# Patient Record
Sex: FEMALE | Race: WHITE
[De-identification: names, ages, dates, MRNs, and addresses within clinical notes are randomized per-mention and may not be internally consistent; named-entity substitution may affect disease eponyms.]

---

## 2019-10-18 ENCOUNTER — TRANSCRIPTION ENCOUNTER (OUTPATIENT)
Age: 79
End: 2019-10-18

## 2019-10-22 ENCOUNTER — EMERGENCY (EMERGENCY)
Facility: HOSPITAL | Age: 79
LOS: 0 days | Discharge: HOME | End: 2019-10-22
Attending: EMERGENCY MEDICINE
Payer: MEDICARE

## 2019-10-22 VITALS
RESPIRATION RATE: 17 BRPM | SYSTOLIC BLOOD PRESSURE: 151 MMHG | DIASTOLIC BLOOD PRESSURE: 82 MMHG | OXYGEN SATURATION: 100 % | TEMPERATURE: 98 F | HEART RATE: 73 BPM

## 2019-10-22 VITALS — WEIGHT: 110.01 LBS | HEIGHT: 59 IN

## 2019-10-22 DIAGNOSIS — F32.9 MAJOR DEPRESSIVE DISORDER, SINGLE EPISODE, UNSPECIFIED: ICD-10-CM

## 2019-10-22 DIAGNOSIS — R05 COUGH: ICD-10-CM

## 2019-10-22 DIAGNOSIS — Z90.89 ACQUIRED ABSENCE OF OTHER ORGANS: Chronic | ICD-10-CM

## 2019-10-22 DIAGNOSIS — J06.9 ACUTE UPPER RESPIRATORY INFECTION, UNSPECIFIED: ICD-10-CM

## 2019-10-22 DIAGNOSIS — F45.1 UNDIFFERENTIATED SOMATOFORM DISORDER: ICD-10-CM

## 2019-10-22 LAB
ALBUMIN SERPL ELPH-MCNC: 3.8 G/DL — SIGNIFICANT CHANGE UP (ref 3.3–5)
ALP SERPL-CCNC: 131 U/L — HIGH (ref 40–120)
ALT FLD-CCNC: 18 U/L — SIGNIFICANT CHANGE UP (ref 12–78)
ANION GAP SERPL CALC-SCNC: 7 MMOL/L — SIGNIFICANT CHANGE UP (ref 5–17)
AST SERPL-CCNC: 28 U/L — SIGNIFICANT CHANGE UP (ref 15–37)
BASOPHILS # BLD AUTO: 0.09 K/UL — SIGNIFICANT CHANGE UP (ref 0–0.2)
BASOPHILS NFR BLD AUTO: 0.8 % — SIGNIFICANT CHANGE UP (ref 0–2)
BILIRUB SERPL-MCNC: 0.5 MG/DL — SIGNIFICANT CHANGE UP (ref 0.2–1.2)
BUN SERPL-MCNC: 9 MG/DL — SIGNIFICANT CHANGE UP (ref 7–23)
CALCIUM SERPL-MCNC: 9.5 MG/DL — SIGNIFICANT CHANGE UP (ref 8.5–10.1)
CHLORIDE SERPL-SCNC: 96 MMOL/L — SIGNIFICANT CHANGE UP (ref 96–108)
CO2 SERPL-SCNC: 24 MMOL/L — SIGNIFICANT CHANGE UP (ref 22–31)
CREAT SERPL-MCNC: 0.92 MG/DL — SIGNIFICANT CHANGE UP (ref 0.5–1.3)
EOSINOPHIL # BLD AUTO: 0.13 K/UL — SIGNIFICANT CHANGE UP (ref 0–0.5)
EOSINOPHIL NFR BLD AUTO: 1.2 % — SIGNIFICANT CHANGE UP (ref 0–6)
GLUCOSE SERPL-MCNC: 104 MG/DL — HIGH (ref 70–99)
HCT VFR BLD CALC: 42.1 % — SIGNIFICANT CHANGE UP (ref 34.5–45)
HGB BLD-MCNC: 14.8 G/DL — SIGNIFICANT CHANGE UP (ref 11.5–15.5)
IMM GRANULOCYTES NFR BLD AUTO: 1.7 % — HIGH (ref 0–1.5)
LYMPHOCYTES # BLD AUTO: 2.47 K/UL — SIGNIFICANT CHANGE UP (ref 1–3.3)
LYMPHOCYTES # BLD AUTO: 22.7 % — SIGNIFICANT CHANGE UP (ref 13–44)
MAGNESIUM SERPL-MCNC: 2.1 MG/DL — SIGNIFICANT CHANGE UP (ref 1.6–2.6)
MCHC RBC-ENTMCNC: 30.1 PG — SIGNIFICANT CHANGE UP (ref 27–34)
MCHC RBC-ENTMCNC: 35.2 GM/DL — SIGNIFICANT CHANGE UP (ref 32–36)
MCV RBC AUTO: 85.6 FL — SIGNIFICANT CHANGE UP (ref 80–100)
MONOCYTES # BLD AUTO: 0.88 K/UL — SIGNIFICANT CHANGE UP (ref 0–0.9)
MONOCYTES NFR BLD AUTO: 8.1 % — SIGNIFICANT CHANGE UP (ref 2–14)
NEUTROPHILS # BLD AUTO: 7.12 K/UL — SIGNIFICANT CHANGE UP (ref 1.8–7.4)
NEUTROPHILS NFR BLD AUTO: 65.5 % — SIGNIFICANT CHANGE UP (ref 43–77)
PHOSPHATE SERPL-MCNC: 3.2 MG/DL — SIGNIFICANT CHANGE UP (ref 2.5–4.5)
PLATELET # BLD AUTO: 398 K/UL — SIGNIFICANT CHANGE UP (ref 150–400)
POTASSIUM SERPL-MCNC: 5.1 MMOL/L — SIGNIFICANT CHANGE UP (ref 3.5–5.3)
POTASSIUM SERPL-SCNC: 5.1 MMOL/L — SIGNIFICANT CHANGE UP (ref 3.5–5.3)
PROT SERPL-MCNC: 8.1 GM/DL — SIGNIFICANT CHANGE UP (ref 6–8.3)
RBC # BLD: 4.92 M/UL — SIGNIFICANT CHANGE UP (ref 3.8–5.2)
RBC # FLD: 12.3 % — SIGNIFICANT CHANGE UP (ref 10.3–14.5)
SODIUM SERPL-SCNC: 127 MMOL/L — LOW (ref 135–145)
TROPONIN I SERPL-MCNC: <0.015 NG/ML — SIGNIFICANT CHANGE UP (ref 0.01–0.04)
WBC # BLD: 10.88 K/UL — HIGH (ref 3.8–10.5)
WBC # FLD AUTO: 10.88 K/UL — HIGH (ref 3.8–10.5)

## 2019-10-22 PROCEDURE — 93005 ELECTROCARDIOGRAM TRACING: CPT

## 2019-10-22 PROCEDURE — 93010 ELECTROCARDIOGRAM REPORT: CPT

## 2019-10-22 PROCEDURE — 80053 COMPREHEN METABOLIC PANEL: CPT

## 2019-10-22 PROCEDURE — 85025 COMPLETE CBC W/AUTO DIFF WBC: CPT

## 2019-10-22 PROCEDURE — 84484 ASSAY OF TROPONIN QUANT: CPT

## 2019-10-22 PROCEDURE — 36415 COLL VENOUS BLD VENIPUNCTURE: CPT

## 2019-10-22 PROCEDURE — 99283 EMERGENCY DEPT VISIT LOW MDM: CPT | Mod: 25

## 2019-10-22 PROCEDURE — 83735 ASSAY OF MAGNESIUM: CPT

## 2019-10-22 PROCEDURE — 99283 EMERGENCY DEPT VISIT LOW MDM: CPT

## 2019-10-22 PROCEDURE — 71045 X-RAY EXAM CHEST 1 VIEW: CPT

## 2019-10-22 PROCEDURE — 84100 ASSAY OF PHOSPHORUS: CPT

## 2019-10-22 PROCEDURE — 71045 X-RAY EXAM CHEST 1 VIEW: CPT | Mod: 26

## 2019-10-22 RX ORDER — SODIUM CHLORIDE 9 MG/ML
1000 INJECTION INTRAMUSCULAR; INTRAVENOUS; SUBCUTANEOUS ONCE
Refills: 0 | Status: COMPLETED | OUTPATIENT
Start: 2019-10-22 | End: 2019-10-22

## 2019-10-22 RX ADMIN — SODIUM CHLORIDE 2000 MILLILITER(S): 9 INJECTION INTRAMUSCULAR; INTRAVENOUS; SUBCUTANEOUS at 11:25

## 2019-10-22 RX ADMIN — SODIUM CHLORIDE 1000 MILLILITER(S): 9 INJECTION INTRAMUSCULAR; INTRAVENOUS; SUBCUTANEOUS at 12:26

## 2019-10-22 NOTE — ED PROVIDER NOTE - MUSCULOSKELETAL, MLM
Spine appears normal, range of motion is not limited, no muscle or joint tenderness. CARRASCO x 4. No focal swelling tenderness.

## 2019-10-22 NOTE — ED STATDOCS - PMH
Esophageal reflux    IBD (inflammatory bowel disease)    Major depressive disorder    Memory disorder    Undifferentiated somatoform disorder

## 2019-10-22 NOTE — ED STATDOCS - PROGRESS NOTE DETAILS
Davida REEDER for ED attending, Dr. Chaves: 78 y/o female with PMHx of undifferentiated somatoform disorder, memory disorder, major depressive disorder, IBD, and esophageal reflux presents to the ED from MD office for "dangerously high potassium level". Pt reports on abx for 3 days for upper respiratory infection, +cough x2 days. Notes +weakness and +fatigue. No fever. Former smoker. NKDA. Will send pt to main ED for further evaluation.

## 2019-10-22 NOTE — ED PROVIDER NOTE - NSFOLLOWUPINSTRUCTIONS_ED_ALL_ED_FT
Blood potassium level done today in ED is normal.  Prior high potassium level was a false level incurred during bloodletting process.  Continue your current medications as per routine.  Follow up regular doctor this week.  Follow up serum Sodium level in 1 - 2 weeks.        ED evaluation and management discussed with the patient and family (if available) in detail.  Close PMD follow up encouraged.  Strict ED return instructions discussed in detail and patient given the opportunity to ask any questions about their discharge diagnosis and instructions. Patient verbalized understanding.

## 2019-10-22 NOTE — ED PROVIDER NOTE - OBJECTIVE STATEMENT
78 y/o female with PMHx of undifferentiated somatoform disorder, memory disorder, major depressive disorder, IBD, and esophageal reflux presents to the ED from MD office for "dangerously high potassium level". Pt reports she is taking PO doxy total of 5 doses for URI. Had a f/u with PMD yesterday and had labs done and showed high potassium. +unproductive constant cough x2 weeks, +minimal sob, +minimal dizziness. +mild decrease po intake, no anorexia Denies fever, n/v. Former smoker. NKDA.

## 2019-10-22 NOTE — ED PROVIDER NOTE - PATIENT PORTAL LINK FT
You can access the FollowMyHealth Patient Portal offered by [facility] by registering at the following website: [email or web address]. By joining [employer]’s FollowMyHealth portal, you will also be able to view your health information using other applications (apps) compatible with our system.

## 2019-10-22 NOTE — ED PROVIDER NOTE - CLINICAL SUMMARY MEDICAL DECISION MAKING FREE TEXT BOX
78 y/o f on PO doxy regarding URI, BIB  after routine outpatient labs done yesterday revealed reported hyperkalemia, Plan; EKG, CXR, labs, IV fluids, potassium normal, sodium mildly decrease, non focal exam no need for admission, CXR negative, DC.

## 2019-10-22 NOTE — ED ADULT NURSE NOTE - NSIMPLEMENTINTERV_GEN_ALL_ED
Implemented All Universal Safety Interventions:  [place] to call system. Call bell, personal items and telephone within reach. Instruct patient to call for assistance. Room bathroom lighting operational. Non-slip footwear when patient is off stretcher. Physically safe environment: no spills, clutter or unnecessary equipment. Stretcher in lowest position, wheels locked, appropriate side rails in place.

## 2019-10-22 NOTE — ED ADULT TRIAGE NOTE - CHIEF COMPLAINT QUOTE
Patient comes to ED from MD office for "dangerously high potassium level". Pt reports on abx for 3 days for upper respiratory infection. Pt reports weakness and fatigue. denies any fevers

## 2022-06-21 ENCOUNTER — EMERGENCY (EMERGENCY)
Facility: HOSPITAL | Age: 82
LOS: 0 days | Discharge: HOME | End: 2022-06-21
Attending: EMERGENCY MEDICINE
Payer: MEDICARE

## 2022-06-21 VITALS
HEART RATE: 70 BPM | OXYGEN SATURATION: 95 % | DIASTOLIC BLOOD PRESSURE: 69 MMHG | RESPIRATION RATE: 18 BRPM | SYSTOLIC BLOOD PRESSURE: 134 MMHG

## 2022-06-21 VITALS — WEIGHT: 108.03 LBS | HEIGHT: 59 IN

## 2022-06-21 DIAGNOSIS — Z90.89 ACQUIRED ABSENCE OF OTHER ORGANS: Chronic | ICD-10-CM

## 2022-06-21 DIAGNOSIS — Z90.89 ACQUIRED ABSENCE OF OTHER ORGANS: ICD-10-CM

## 2022-06-21 DIAGNOSIS — F32.9 MAJOR DEPRESSIVE DISORDER, SINGLE EPISODE, UNSPECIFIED: ICD-10-CM

## 2022-06-21 DIAGNOSIS — F45.1 UNDIFFERENTIATED SOMATOFORM DISORDER: ICD-10-CM

## 2022-06-21 DIAGNOSIS — K52.9 NONINFECTIVE GASTROENTERITIS AND COLITIS, UNSPECIFIED: ICD-10-CM

## 2022-06-21 DIAGNOSIS — R63.0 ANOREXIA: ICD-10-CM

## 2022-06-21 DIAGNOSIS — K21.9 GASTRO-ESOPHAGEAL REFLUX DISEASE WITHOUT ESOPHAGITIS: ICD-10-CM

## 2022-06-21 DIAGNOSIS — R41.3 OTHER AMNESIA: ICD-10-CM

## 2022-06-21 DIAGNOSIS — R42 DIZZINESS AND GIDDINESS: ICD-10-CM

## 2022-06-21 LAB
ALBUMIN SERPL ELPH-MCNC: 3.6 G/DL — SIGNIFICANT CHANGE UP (ref 3.3–5)
ALP SERPL-CCNC: 104 U/L — SIGNIFICANT CHANGE UP (ref 40–120)
ALT FLD-CCNC: 16 U/L — SIGNIFICANT CHANGE UP (ref 12–78)
ANION GAP SERPL CALC-SCNC: 8 MMOL/L — SIGNIFICANT CHANGE UP (ref 5–17)
APPEARANCE UR: CLEAR — SIGNIFICANT CHANGE UP
AST SERPL-CCNC: 10 U/L — LOW (ref 15–37)
BASOPHILS # BLD AUTO: 0.06 K/UL — SIGNIFICANT CHANGE UP (ref 0–0.2)
BASOPHILS NFR BLD AUTO: 0.5 % — SIGNIFICANT CHANGE UP (ref 0–2)
BILIRUB SERPL-MCNC: 0.5 MG/DL — SIGNIFICANT CHANGE UP (ref 0.2–1.2)
BILIRUB UR-MCNC: NEGATIVE — SIGNIFICANT CHANGE UP
BUN SERPL-MCNC: 16 MG/DL — SIGNIFICANT CHANGE UP (ref 7–23)
CALCIUM SERPL-MCNC: 8.8 MG/DL — SIGNIFICANT CHANGE UP (ref 8.5–10.1)
CHLORIDE SERPL-SCNC: 104 MMOL/L — SIGNIFICANT CHANGE UP (ref 96–108)
CO2 SERPL-SCNC: 24 MMOL/L — SIGNIFICANT CHANGE UP (ref 22–31)
COLOR SPEC: YELLOW — SIGNIFICANT CHANGE UP
CREAT SERPL-MCNC: 1.11 MG/DL — SIGNIFICANT CHANGE UP (ref 0.5–1.3)
DIFF PNL FLD: ABNORMAL
EGFR: 50 ML/MIN/1.73M2 — LOW
EOSINOPHIL # BLD AUTO: 0.04 K/UL — SIGNIFICANT CHANGE UP (ref 0–0.5)
EOSINOPHIL NFR BLD AUTO: 0.4 % — SIGNIFICANT CHANGE UP (ref 0–6)
GLUCOSE SERPL-MCNC: 115 MG/DL — HIGH (ref 70–99)
GLUCOSE UR QL: NEGATIVE — SIGNIFICANT CHANGE UP
HCT VFR BLD CALC: 42.5 % — SIGNIFICANT CHANGE UP (ref 34.5–45)
HGB BLD-MCNC: 14.2 G/DL — SIGNIFICANT CHANGE UP (ref 11.5–15.5)
IMM GRANULOCYTES NFR BLD AUTO: 0.4 % — SIGNIFICANT CHANGE UP (ref 0–1.5)
KETONES UR-MCNC: NEGATIVE — SIGNIFICANT CHANGE UP
LEUKOCYTE ESTERASE UR-ACNC: NEGATIVE — SIGNIFICANT CHANGE UP
LYMPHOCYTES # BLD AUTO: 1.08 K/UL — SIGNIFICANT CHANGE UP (ref 1–3.3)
LYMPHOCYTES # BLD AUTO: 9.8 % — LOW (ref 13–44)
MCHC RBC-ENTMCNC: 29.1 PG — SIGNIFICANT CHANGE UP (ref 27–34)
MCHC RBC-ENTMCNC: 33.4 GM/DL — SIGNIFICANT CHANGE UP (ref 32–36)
MCV RBC AUTO: 87.1 FL — SIGNIFICANT CHANGE UP (ref 80–100)
MONOCYTES # BLD AUTO: 0.43 K/UL — SIGNIFICANT CHANGE UP (ref 0–0.9)
MONOCYTES NFR BLD AUTO: 3.9 % — SIGNIFICANT CHANGE UP (ref 2–14)
NEUTROPHILS # BLD AUTO: 9.37 K/UL — HIGH (ref 1.8–7.4)
NEUTROPHILS NFR BLD AUTO: 85 % — HIGH (ref 43–77)
NITRITE UR-MCNC: NEGATIVE — SIGNIFICANT CHANGE UP
PH UR: 7 — SIGNIFICANT CHANGE UP (ref 5–8)
PLATELET # BLD AUTO: 337 K/UL — SIGNIFICANT CHANGE UP (ref 150–400)
POTASSIUM SERPL-MCNC: 4.3 MMOL/L — SIGNIFICANT CHANGE UP (ref 3.5–5.3)
POTASSIUM SERPL-SCNC: 4.3 MMOL/L — SIGNIFICANT CHANGE UP (ref 3.5–5.3)
PROT SERPL-MCNC: 7.4 GM/DL — SIGNIFICANT CHANGE UP (ref 6–8.3)
PROT UR-MCNC: NEGATIVE — SIGNIFICANT CHANGE UP
RBC # BLD: 4.88 M/UL — SIGNIFICANT CHANGE UP (ref 3.8–5.2)
RBC # FLD: 12.7 % — SIGNIFICANT CHANGE UP (ref 10.3–14.5)
SODIUM SERPL-SCNC: 136 MMOL/L — SIGNIFICANT CHANGE UP (ref 135–145)
SP GR SPEC: 1 — LOW (ref 1.01–1.02)
UROBILINOGEN FLD QL: NEGATIVE — SIGNIFICANT CHANGE UP
WBC # BLD: 11.02 K/UL — HIGH (ref 3.8–10.5)
WBC # FLD AUTO: 11.02 K/UL — HIGH (ref 3.8–10.5)

## 2022-06-21 PROCEDURE — 80053 COMPREHEN METABOLIC PANEL: CPT

## 2022-06-21 PROCEDURE — 87086 URINE CULTURE/COLONY COUNT: CPT

## 2022-06-21 PROCEDURE — 99283 EMERGENCY DEPT VISIT LOW MDM: CPT

## 2022-06-21 PROCEDURE — 36415 COLL VENOUS BLD VENIPUNCTURE: CPT

## 2022-06-21 PROCEDURE — 81001 URINALYSIS AUTO W/SCOPE: CPT

## 2022-06-21 PROCEDURE — 99284 EMERGENCY DEPT VISIT MOD MDM: CPT | Mod: FS

## 2022-06-21 PROCEDURE — 93010 ELECTROCARDIOGRAM REPORT: CPT

## 2022-06-21 PROCEDURE — 85025 COMPLETE CBC W/AUTO DIFF WBC: CPT

## 2022-06-21 PROCEDURE — 93005 ELECTROCARDIOGRAM TRACING: CPT

## 2022-06-21 RX ORDER — MECLIZINE HCL 12.5 MG
1 TABLET ORAL
Qty: 21 | Refills: 0
Start: 2022-06-21 | End: 2022-06-27

## 2022-06-21 RX ORDER — SODIUM CHLORIDE 9 MG/ML
1000 INJECTION INTRAMUSCULAR; INTRAVENOUS; SUBCUTANEOUS ONCE
Refills: 0 | Status: COMPLETED | OUTPATIENT
Start: 2022-06-21 | End: 2022-06-21

## 2022-06-21 RX ORDER — MECLIZINE HCL 12.5 MG
12.5 TABLET ORAL ONCE
Refills: 0 | Status: COMPLETED | OUTPATIENT
Start: 2022-06-21 | End: 2022-06-21

## 2022-06-21 RX ADMIN — SODIUM CHLORIDE 1000 MILLILITER(S): 9 INJECTION INTRAMUSCULAR; INTRAVENOUS; SUBCUTANEOUS at 13:17

## 2022-06-21 RX ADMIN — Medication 12.5 MILLIGRAM(S): at 13:17

## 2022-06-21 NOTE — ED STATDOCS - PATIENT PORTAL LINK FT
You can access the FollowMyHealth Patient Portal offered by [facility] by registering at the following website: [email or web address]. By joining [employer]’s FollowMyHealth portal, you will also be able to view your health information using other applications (apps) compatible with our system.

## 2022-06-21 NOTE — ED STATDOCS - NS ED ATTENDING STATEMENT MOD
This was a shared visit with the TARA. I reviewed and verified the documentation and independently performed the documented:

## 2022-06-21 NOTE — ED STATDOCS - CLINICAL SUMMARY MEDICAL DECISION MAKING FREE TEXT BOX
Pt c/o dizziness x6 weeks, per  pt has had extensive workup w/ PCP, neuro, and cardio with reports of graham MRI. Pt here w/ no deficits. Will check labs, EKG, and reevaluate.

## 2022-06-21 NOTE — ED ADULT NURSE NOTE - OBJECTIVE STATEMENT
Pt c/i dizziness for 6 weeks .pt hd clearness with cardiologist and cardiologist with no significant finding. pt was send by her md for lost of appetite. pt is walking with steady gait . according to  pt expressing her thoughts to end her life no plan. Pt c/i dizziness for 6 weeks .pt hd clearness with cardiologist and cardiologist with no significant finding. pt was send by her md for lost of appetite. pt is walking with steady gait . .

## 2022-06-21 NOTE — ED STATDOCS - NSFOLLOWUPINSTRUCTIONS_ED_ALL_ED_FT
Follow up with your neurologist for further evaluation of your symptoms.  Take the medication as directed.     Return to the ER for any new or other concerns.       Dizziness    WHAT YOU NEED TO KNOW:    Dizziness is a feeling of being off balance or unsteady. Common causes of dizziness are an inner ear fluid imbalance or a lack of oxygen in your blood. Dizziness may be acute (lasts 3 days or less) or chronic (lasts longer than 3 days). You may have dizzy spells that last from seconds to a few hours.     DISCHARGE INSTRUCTIONS:    Return to the emergency department if:     You have a headache and a stiff neck.      You have shaking chills and a fever.       You vomit over and over with no relief.       Your vomit or bowel movements are red or black.       You have pain in your chest, back, or abdomen.       You have numbness, especially in your face, arms, or legs.       You have trouble moving your arms or legs.       You are confused.     Contact your healthcare provider if:     You have a fever.       Your symptoms do not get better with treatment.       You have questions or concerns about your condition or care.     Manage your symptoms:     Do not drive or operate heavy machinery when you are dizzy.       Get up slowly from sitting or lying down.       Drink plenty of liquids. Liquids help prevent dehydration. Ask how much liquid to drink each day and which liquids are best for you.    Follow up with your healthcare provider as directed: Write down your questions so you remember to ask them during your visits.

## 2022-06-21 NOTE — ED STATDOCS - OBJECTIVE STATEMENT
83 y/o female w/ a PMHx of undifferentiated somatoform disorder, memory disorder, major depressive disorder, IBD, and esophageal reflux presents to the ED BIB  c/o worsening room spinning dizziness. Pt  reports pt started c/o dizziness x6 weeks ago and for the 2 weeks dizziness has been worsening. Pt  reports pt saw a neuro and cardio w/o any obvious findings. Pt  also reports decreased PO intake.

## 2022-06-21 NOTE — ED STATDOCS - ATTENDING APP SHARED VISIT CONTRIBUTION OF CARE
I,Phoenix Scott MD,  performed the initial face to face bedside interview with this patient regarding history of present illness, review of symptoms and relevant past medical, social and family history.  I completed an independent physical examination.  I was the initial provider who evaluated this patient. I have signed out the follow up of any pending tests (i.e. labs, radiological studies) to the ACP.  I have communicated the patient’s plan of care and disposition with the ACP.  The history, relevant review of systems, past medical and surgical history, medical decision making, and physical examination was documented by the scribe in my presence and I attest to the accuracy of the documentation.

## 2022-06-21 NOTE — ED STATDOCS - PROGRESS NOTE DETAILS
83 yo female with a PMH of chronic memory disorder, IBD, GERD, presents with dizziness x 6 weeks. Pt was evaluated by cardio and neurology and was sent for EEG, MRI and have been unremarkable. Pt states she still becomes dizzy. Dizziness is characterized as room spinning. Pt was not prescribed medications for her symptoms. For the last 2 days, pt also had a decreased appetite. Denies cp, sob, n/v/d. Gait steady in intake. WIll obtain, labs, UA, IVF, meds, and reeval. -Maurilio Bertrand PA-C Pt feeling better after the medication. Discussed results with pt and . Informed that will send medication to the pharmacy for vertigo and to f/u with neurology. Pt aware and agrees with plan. -Maurilio Bertrand PA-C

## 2022-06-21 NOTE — ED STATDOCS - NSICDXPASTMEDICALHX_GEN_ALL_CORE_FT
PAST MEDICAL HISTORY:  Esophageal reflux     IBD (inflammatory bowel disease)     Major depressive disorder     Memory disorder     Undifferentiated somatoform disorder

## 2022-06-22 LAB
CULTURE RESULTS: SIGNIFICANT CHANGE UP
SPECIMEN SOURCE: SIGNIFICANT CHANGE UP

## 2022-06-23 ENCOUNTER — EMERGENCY (EMERGENCY)
Facility: HOSPITAL | Age: 82
LOS: 0 days | Discharge: HOME | End: 2022-06-23
Attending: EMERGENCY MEDICINE
Payer: MEDICARE

## 2022-06-23 VITALS
HEART RATE: 85 BPM | DIASTOLIC BLOOD PRESSURE: 90 MMHG | SYSTOLIC BLOOD PRESSURE: 157 MMHG | RESPIRATION RATE: 16 BRPM | OXYGEN SATURATION: 96 % | TEMPERATURE: 98 F

## 2022-06-23 VITALS — WEIGHT: 119.93 LBS | HEIGHT: 59 IN

## 2022-06-23 DIAGNOSIS — K50.00 CROHN'S DISEASE OF SMALL INTESTINE WITHOUT COMPLICATIONS: ICD-10-CM

## 2022-06-23 DIAGNOSIS — F32.A DEPRESSION, UNSPECIFIED: ICD-10-CM

## 2022-06-23 DIAGNOSIS — R63.0 ANOREXIA: ICD-10-CM

## 2022-06-23 DIAGNOSIS — K21.9 GASTRO-ESOPHAGEAL REFLUX DISEASE WITHOUT ESOPHAGITIS: ICD-10-CM

## 2022-06-23 DIAGNOSIS — R42 DIZZINESS AND GIDDINESS: ICD-10-CM

## 2022-06-23 DIAGNOSIS — R19.7 DIARRHEA, UNSPECIFIED: ICD-10-CM

## 2022-06-23 DIAGNOSIS — F41.3 OTHER MIXED ANXIETY DISORDERS: ICD-10-CM

## 2022-06-23 DIAGNOSIS — F45.9 SOMATOFORM DISORDER, UNSPECIFIED: ICD-10-CM

## 2022-06-23 DIAGNOSIS — Z90.89 ACQUIRED ABSENCE OF OTHER ORGANS: Chronic | ICD-10-CM

## 2022-06-23 LAB
ALBUMIN SERPL ELPH-MCNC: 3.4 G/DL — SIGNIFICANT CHANGE UP (ref 3.3–5)
ALP SERPL-CCNC: 109 U/L — SIGNIFICANT CHANGE UP (ref 40–120)
ALT FLD-CCNC: 14 U/L — SIGNIFICANT CHANGE UP (ref 12–78)
ANION GAP SERPL CALC-SCNC: 4 MMOL/L — LOW (ref 5–17)
AST SERPL-CCNC: 16 U/L — SIGNIFICANT CHANGE UP (ref 15–37)
BASOPHILS # BLD AUTO: 0.04 K/UL — SIGNIFICANT CHANGE UP (ref 0–0.2)
BASOPHILS NFR BLD AUTO: 0.5 % — SIGNIFICANT CHANGE UP (ref 0–2)
BILIRUB SERPL-MCNC: 0.3 MG/DL — SIGNIFICANT CHANGE UP (ref 0.2–1.2)
BUN SERPL-MCNC: 18 MG/DL — SIGNIFICANT CHANGE UP (ref 7–23)
CALCIUM SERPL-MCNC: 8.8 MG/DL — SIGNIFICANT CHANGE UP (ref 8.5–10.1)
CHLORIDE SERPL-SCNC: 102 MMOL/L — SIGNIFICANT CHANGE UP (ref 96–108)
CO2 SERPL-SCNC: 28 MMOL/L — SIGNIFICANT CHANGE UP (ref 22–31)
CREAT SERPL-MCNC: 1.01 MG/DL — SIGNIFICANT CHANGE UP (ref 0.5–1.3)
EGFR: 56 ML/MIN/1.73M2 — LOW
EOSINOPHIL # BLD AUTO: 0.08 K/UL — SIGNIFICANT CHANGE UP (ref 0–0.5)
EOSINOPHIL NFR BLD AUTO: 1 % — SIGNIFICANT CHANGE UP (ref 0–6)
GLUCOSE SERPL-MCNC: 102 MG/DL — HIGH (ref 70–99)
HCT VFR BLD CALC: 43.1 % — SIGNIFICANT CHANGE UP (ref 34.5–45)
HGB BLD-MCNC: 14.4 G/DL — SIGNIFICANT CHANGE UP (ref 11.5–15.5)
IMM GRANULOCYTES NFR BLD AUTO: 0.4 % — SIGNIFICANT CHANGE UP (ref 0–1.5)
LYMPHOCYTES # BLD AUTO: 1.54 K/UL — SIGNIFICANT CHANGE UP (ref 1–3.3)
LYMPHOCYTES # BLD AUTO: 18.7 % — SIGNIFICANT CHANGE UP (ref 13–44)
MCHC RBC-ENTMCNC: 29.4 PG — SIGNIFICANT CHANGE UP (ref 27–34)
MCHC RBC-ENTMCNC: 33.4 GM/DL — SIGNIFICANT CHANGE UP (ref 32–36)
MCV RBC AUTO: 88.1 FL — SIGNIFICANT CHANGE UP (ref 80–100)
MONOCYTES # BLD AUTO: 0.55 K/UL — SIGNIFICANT CHANGE UP (ref 0–0.9)
MONOCYTES NFR BLD AUTO: 6.7 % — SIGNIFICANT CHANGE UP (ref 2–14)
NEUTROPHILS # BLD AUTO: 6 K/UL — SIGNIFICANT CHANGE UP (ref 1.8–7.4)
NEUTROPHILS NFR BLD AUTO: 72.7 % — SIGNIFICANT CHANGE UP (ref 43–77)
PLATELET # BLD AUTO: 289 K/UL — SIGNIFICANT CHANGE UP (ref 150–400)
POTASSIUM SERPL-MCNC: 4.2 MMOL/L — SIGNIFICANT CHANGE UP (ref 3.5–5.3)
POTASSIUM SERPL-SCNC: 4.2 MMOL/L — SIGNIFICANT CHANGE UP (ref 3.5–5.3)
PROT SERPL-MCNC: 7.4 GM/DL — SIGNIFICANT CHANGE UP (ref 6–8.3)
RBC # BLD: 4.89 M/UL — SIGNIFICANT CHANGE UP (ref 3.8–5.2)
RBC # FLD: 12.9 % — SIGNIFICANT CHANGE UP (ref 10.3–14.5)
SODIUM SERPL-SCNC: 134 MMOL/L — LOW (ref 135–145)
TROPONIN I, HIGH SENSITIVITY RESULT: 3.66 NG/L — SIGNIFICANT CHANGE UP
WBC # BLD: 8.24 K/UL — SIGNIFICANT CHANGE UP (ref 3.8–10.5)
WBC # FLD AUTO: 8.24 K/UL — SIGNIFICANT CHANGE UP (ref 3.8–10.5)

## 2022-06-23 PROCEDURE — U0005: CPT

## 2022-06-23 PROCEDURE — 93010 ELECTROCARDIOGRAM REPORT: CPT

## 2022-06-23 PROCEDURE — 99285 EMERGENCY DEPT VISIT HI MDM: CPT

## 2022-06-23 PROCEDURE — 70450 CT HEAD/BRAIN W/O DYE: CPT | Mod: MA

## 2022-06-23 PROCEDURE — 93005 ELECTROCARDIOGRAM TRACING: CPT

## 2022-06-23 PROCEDURE — 70450 CT HEAD/BRAIN W/O DYE: CPT | Mod: 26,MA

## 2022-06-23 PROCEDURE — 71045 X-RAY EXAM CHEST 1 VIEW: CPT

## 2022-06-23 PROCEDURE — 84484 ASSAY OF TROPONIN QUANT: CPT

## 2022-06-23 PROCEDURE — 80053 COMPREHEN METABOLIC PANEL: CPT

## 2022-06-23 PROCEDURE — 36415 COLL VENOUS BLD VENIPUNCTURE: CPT

## 2022-06-23 PROCEDURE — 71045 X-RAY EXAM CHEST 1 VIEW: CPT | Mod: 26

## 2022-06-23 PROCEDURE — U0003: CPT

## 2022-06-23 PROCEDURE — 85025 COMPLETE CBC W/AUTO DIFF WBC: CPT

## 2022-06-23 PROCEDURE — 99285 EMERGENCY DEPT VISIT HI MDM: CPT | Mod: 25

## 2022-06-23 RX ORDER — SODIUM CHLORIDE 9 MG/ML
1000 INJECTION INTRAMUSCULAR; INTRAVENOUS; SUBCUTANEOUS ONCE
Refills: 0 | Status: COMPLETED | OUTPATIENT
Start: 2022-06-23 | End: 2022-06-23

## 2022-06-23 RX ORDER — MECLIZINE HCL 12.5 MG
25 TABLET ORAL ONCE
Refills: 0 | Status: COMPLETED | OUTPATIENT
Start: 2022-06-23 | End: 2022-06-23

## 2022-06-23 RX ADMIN — Medication 25 MILLIGRAM(S): at 21:31

## 2022-06-23 RX ADMIN — SODIUM CHLORIDE 1000 MILLILITER(S): 9 INJECTION INTRAMUSCULAR; INTRAVENOUS; SUBCUTANEOUS at 21:31

## 2022-06-23 NOTE — ED ADULT TRIAGE NOTE - CHIEF COMPLAINT QUOTE
Pt presents to the ED c/o worsening dizziness x5 weeks. Pt seen in ED 2 days ago for the same symptoms and told to f/u with her neurologist. No other complaints.

## 2022-06-23 NOTE — ED PROVIDER NOTE - NS ED ROS FT
Constitutional: No fever or chills  Eyes: No visual changes  HEENT: No throat pain  CV: No chest pain  Resp: No SOB no cough  GI: No abd pain, nausea or vomiting, +diarrhea   : No dysuria  MSK: No musculoskeletal pain  Skin: No rash  Neuro: No headache, +dizziness

## 2022-06-23 NOTE — ED PROVIDER NOTE - PATIENT PORTAL LINK FT
You can access the FollowMyHealth Patient Portal offered by [facility] by registering at the following website: [email or web address]. By joining [employer]’s FollowMyHealth portal, you will also be able to view your health information using other applications (apps) compatible with our system.

## 2022-06-23 NOTE — ED PROVIDER NOTE - CARE PROVIDER_API CALL
Amanda Fam)  Neurology  [address] [facility], [address] [place] [address]  Phone: [phone]  Fax: [phone]  Follow Up Time: 4-6 Days

## 2022-06-23 NOTE — ED PROVIDER NOTE - PROGRESS NOTE DETAILS
Davida Barnes for attending Dr. Pisano   Pt signed out to incoming ED  offered pt admission, she declines she is aaox4, discussed with pts  who would like her to stay in hospital, pt declines.  will dc home with return precuautions. pt ambulated in no distress without any assistance. pt has meclizine at home

## 2022-06-23 NOTE — ED PROVIDER NOTE - OBJECTIVE STATEMENT
81 yo female w/PMHx of Chron' s disease on Humira presents to the ED for dizziness and diarrhea. Pt was here in 6/21 for dizziness and was given medication. According to , pt did not feel better after the medication but was still dc. Pt has ALVIN for several weeks and c/o her legs shaking when she stands. Today pt had episode of diarrhea and came to the ED for evaluation. No other complaints at this time.

## 2022-06-23 NOTE — ED PROVIDER NOTE - DATE/TIME 2
ED Addendum





- Addendum


Addendum: 





04/08/18 12:28


chart accessed for pharm question
PD HPI FEMALE 





- Stated complaint


Stated Complaint: FEMALE 





- Chief complaint


Chief Complaint: Abd Pain





- History obtained from


History obtained from: Patient





- History of Present Illness


Timing - onset: How many days ago (2)


Timing - details: Gradual onset, Still present


Associated symptoms: Pelvic pain, Dysuria, Urinary frequency


Similar symptoms before: Work up / diagnostics


Recently seen: Not recently seen





- Additional information


Additional information: 





Patient is a 25 year old female with no significant past medical history who is 

presenting to the emergency department for dysuria and increased urinary 

frequency.  Patient states that it has been going on for the last two days.  

patient took azo but the symptoms persisted.  





Review of Systems


Constitutional: denies: Fever, Chills


Eyes: reports: Reviewed and negative


Ears: reports: Reviewed and negative


Nose: reports: Reviewed and negative


Throat: reports: Reviewed and negative


Cardiac: reports: Reviewed and negative


Respiratory: reports: Reviewed and negative


GI: reports: Abdominal Pain.  denies: Nausea, Vomiting


: reports: Dysuria, Frequency


Skin: reports: Reviewed and negative


Musculoskeletal: reports: Reviewed and negative


Neurologic: reports: Reviewed and negative


Immunocompromised: denies: Immunocompromised





PD PAST MEDICAL HISTORY





- Past Medical History


Past Medical History: Yes


Other Past Medical History: Frequent UTIs





- Past Surgical History


Past Surgical History: No





- Present Medications


Home Medications: 


 Ambulatory Orders











 Medication  Instructions  Recorded  Confirmed


 


Fexofenadine [Allegra] 60 PRN 02/12/14 02/12/14


 


Phenazopyridine HCl [Pyridium] 200 mg PO TID PRN #6 tablet 04/07/18 


 


Sulfamethox/Trimeth 800/160 1 each PO BID #10 tablet 04/07/18 





[Bactrim Ds 800/160]   














- Allergies


Allergies/Adverse Reactions: 


 Allergies











Allergy/AdvReac Type Severity Reaction Status Date / Time


 


No Known Drug Allergies Allergy   Verified 04/07/18 19:42














- Social History


Does the pt smoke?: No


Smoking Status: Never smoker


Does the pt drink ETOH?: Yes


Does the pt have substance abuse?: No





- Immunizations


Immunizations are current?: Yes


Immunizations: TDAP current <10years





- POLST


Patient has POLST: No





PD ED PE NORMAL





- Vitals


Vital signs reviewed: Yes





- General


General: Alert and oriented X 3, No acute distress





- HEENT


HEENT: Atraumatic, PERRL





- Cardiac


Cardiac: RRR





- Respiratory


Respiratory: No respiratory distress





- Abdomen


Abdomen: Non distended





- Derm


Derm: Normal color, Warm and dry





- Extremities


Extremities: No deformity





- Neuro


Neuro: Alert and oriented X 3


Eye Opening: Spontaneous





Results





- Vitals


Vitals: 


 Vital Signs - 24 hr











  04/07/18





  19:40


 


Temperature 35.9 C L


 


Heart Rate 86


 


Respiratory 15





Rate 


 


Blood Pressure 128/85 H


 


O2 Saturation 100








 Oxygen











O2 Source                      Room air

















- Labs


Labs: 


 Laboratory Tests











  04/07/18





  19:28


 


Urine Color  ORANGE


 


Urine Clarity  CLEAR


 


Urine pH  


 


Ur Specific Gravity  


 


Urine Protein  


 


Urine Glucose (UA)  


 


Urine Ketones  


 


Urine Occult Blood  


 


Urine Nitrite  


 


Urine Bilirubin  


 


Urine Urobilinogen  


 


Ur Leukocyte Esterase  NEGATIVE


 


Urine RBC  0-5


 


Urine WBC  6-10 H


 


Ur Squamous Epith Cells  FEW Squamous


 


Urine Bacteria  Few


 


Ur Microscopic Review  INDICATED


 


Urine Culture Comments  INDICATED


 


Urine HCG, Qual  Cancelled














PD MEDICAL DECISION MAKING





- ED course


Complexity details: reviewed old records, reviewed results, re-evaluated patient

, considered differential, d/w patient


ED course: 





Patient was seen and examined at bedside.  patient's urine was collected.  

Patient was well appearing and in no acute distress.  patient was started on 

bactrim.  Patient required no further work up and was stable for discharge with 

outpatient follow up.  





Departure





- Departure


Disposition: 01 Home, Self Care


Clinical Impression: 


 Urinary tract infection





Condition: Good


Instructions:  ED UTI Cystitis Female


Follow-Up: 


SALTY NICOLE [Primary Care Provider] - As Needed


Prescriptions: 


Phenazopyridine HCl [Pyridium] 200 mg PO TID PRN #6 tablet


 PRN Reason: dysuria


Sulfamethox/Trimeth 800/160 [Bactrim Ds 800/160] 1 each PO BID #10 tablet


Comments: 


Your symptoms today are being caused by a urinary tract infection. You should 

stay well hydrated.  You will have your first dose of antibiotics today and 

will be on them for the next 5 days.  you should follow up with your doctor if 

your symptoms persist.  You can continue with the azo and take motrin or 

tylenol as needed for pain.  You should return to the emergency department for 

uncontrollable vomiting, new worsening or uncontrollable symptoms.  


Discharge Date/Time: 04/07/18 20:20
23-Jun-2022 22:25

## 2022-06-23 NOTE — ED PROVIDER NOTE - WR ORDER DATE AND TIME 1
Detail Level: Detailed Add 82889 Cpt? (Important Note: In 2017 The Use Of 71800 Is Being Tracked By Cms To Determine Future Global Period Reimbursement For Global Periods): yes 23-Jun-2022 20:48

## 2022-06-23 NOTE — ED PROVIDER NOTE - CLINICAL SUMMARY MEDICAL DECISION MAKING FREE TEXT BOX
83 yo female w/PMHx of Chron' s disease on Humira presents to the ED for recurrent dizziness for weeks. Pt was dc from the hospital recently and has not been feeling better and had an episode of diarrhea today. Will get CT, meclizine for dizziness, labs to ensure that this is not from a central cause, admit for further obs and evaluation.

## 2022-06-23 NOTE — ED ADULT NURSE NOTE - OBJECTIVE STATEMENT
pt presents to er for evaluation of dizziness. pt was seen in ED 2d ago and worked up. reported relief at time of previous visit however pt now says unable to find relief of dizziness. pt  endorsing poor po intake. pt aaox4, forgetful. moving all extremities equally. no c/o pain. iv access established 20# to R AC. bloodwork drawn and sent to lab. IVF infusing per drs orders.

## 2023-03-01 NOTE — ED PROVIDER NOTE - NPI NUMBER (FOR SYSADMIN USE ONLY) :
3/1/2023         RE: Marixa Liriano  [address]  [facility] [id number]        Dear Colleague,    Thank you for referring your patient, Marixa Liriano, to the [facility]. Please see a copy of my visit note below.    This patient was hospitalized recently for an anterior midline pain and was found to have a fluid collection next to the pubic symphysis.  She was treated with IV antibiotics and is still on oral antibiotics for at least another 2 days.  Her anterior midline symphysis pain has largely resolved.  Today she is mostly complaining of pain over both trochanters and a long-term history of knee discomfort.  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    On examination today she is alert oriented has a normal mood and affect and is in no acute distress.  She is able to stand and walk without assistance although has some discomfort.  She is tender over the greater trochanters and proximal posterior aspect.    I reviewed her imaging studies which show fluid collection just inferior to the pubic symphysis.  I also reviewed labs showing increased but rapidly dropping C-reactive protein while she was hospitalized more than 10 days ago.    The plan is for her to complete her antibiotics.  I would like to have a phone conversation with her at least in 2 weeks to confirm that her symptoms have resolved.  I have given her a work note for her to be off work for 2 weeks then half time for 2 weeks before returning to full-time.  I have answered all of the family's questions.      Hugh De Los Santos MD     [[id number]]

## 2024-02-18 ENCOUNTER — INPATIENT (INPATIENT)
Facility: HOSPITAL | Age: 84
LOS: 2 days | Discharge: HOME HEALTH | DRG: 641 | End: 2024-02-21
Attending: STUDENT IN AN ORGANIZED HEALTH CARE EDUCATION/TRAINING PROGRAM | Admitting: STUDENT IN AN ORGANIZED HEALTH CARE EDUCATION/TRAINING PROGRAM
Payer: MEDICARE

## 2024-02-18 VITALS
OXYGEN SATURATION: 98 % | WEIGHT: 130.07 LBS | DIASTOLIC BLOOD PRESSURE: 99 MMHG | HEIGHT: 59 IN | TEMPERATURE: 98 F | SYSTOLIC BLOOD PRESSURE: 132 MMHG | RESPIRATION RATE: 18 BRPM | HEART RATE: 61 BPM

## 2024-02-18 DIAGNOSIS — Z90.89 ACQUIRED ABSENCE OF OTHER ORGANS: Chronic | ICD-10-CM

## 2024-02-18 LAB
ALBUMIN SERPL ELPH-MCNC: 4 G/DL — SIGNIFICANT CHANGE UP (ref 3.3–5)
ALP SERPL-CCNC: 90 U/L — SIGNIFICANT CHANGE UP (ref 40–120)
ALT FLD-CCNC: 29 U/L — SIGNIFICANT CHANGE UP (ref 12–78)
ANION GAP SERPL CALC-SCNC: 5 MMOL/L — SIGNIFICANT CHANGE UP (ref 5–17)
AST SERPL-CCNC: 32 U/L — SIGNIFICANT CHANGE UP (ref 15–37)
BASOPHILS # BLD AUTO: 0.05 K/UL — SIGNIFICANT CHANGE UP (ref 0–0.2)
BASOPHILS NFR BLD AUTO: 0.6 % — SIGNIFICANT CHANGE UP (ref 0–2)
BILIRUB SERPL-MCNC: 0.7 MG/DL — SIGNIFICANT CHANGE UP (ref 0.2–1.2)
BUN SERPL-MCNC: 19 MG/DL — SIGNIFICANT CHANGE UP (ref 7–23)
CALCIUM SERPL-MCNC: 9.9 MG/DL — SIGNIFICANT CHANGE UP (ref 8.5–10.1)
CHLORIDE SERPL-SCNC: 93 MMOL/L — LOW (ref 96–108)
CO2 SERPL-SCNC: 30 MMOL/L — SIGNIFICANT CHANGE UP (ref 22–31)
CREAT SERPL-MCNC: 1.19 MG/DL — SIGNIFICANT CHANGE UP (ref 0.5–1.3)
EGFR: 45 ML/MIN/1.73M2 — LOW
EOSINOPHIL # BLD AUTO: 0.06 K/UL — SIGNIFICANT CHANGE UP (ref 0–0.5)
EOSINOPHIL NFR BLD AUTO: 0.7 % — SIGNIFICANT CHANGE UP (ref 0–6)
GLUCOSE SERPL-MCNC: 129 MG/DL — HIGH (ref 70–99)
HCT VFR BLD CALC: 44.1 % — SIGNIFICANT CHANGE UP (ref 34.5–45)
HGB BLD-MCNC: 14.8 G/DL — SIGNIFICANT CHANGE UP (ref 11.5–15.5)
IMM GRANULOCYTES NFR BLD AUTO: 0.4 % — SIGNIFICANT CHANGE UP (ref 0–0.9)
LYMPHOCYTES # BLD AUTO: 1.33 K/UL — SIGNIFICANT CHANGE UP (ref 1–3.3)
LYMPHOCYTES # BLD AUTO: 14.7 % — SIGNIFICANT CHANGE UP (ref 13–44)
MAGNESIUM SERPL-MCNC: 2 MG/DL — SIGNIFICANT CHANGE UP (ref 1.6–2.6)
MCHC RBC-ENTMCNC: 29.5 PG — SIGNIFICANT CHANGE UP (ref 27–34)
MCHC RBC-ENTMCNC: 33.6 GM/DL — SIGNIFICANT CHANGE UP (ref 32–36)
MCV RBC AUTO: 87.8 FL — SIGNIFICANT CHANGE UP (ref 80–100)
MONOCYTES # BLD AUTO: 0.49 K/UL — SIGNIFICANT CHANGE UP (ref 0–0.9)
MONOCYTES NFR BLD AUTO: 5.4 % — SIGNIFICANT CHANGE UP (ref 2–14)
NEUTROPHILS # BLD AUTO: 7.08 K/UL — SIGNIFICANT CHANGE UP (ref 1.8–7.4)
NEUTROPHILS NFR BLD AUTO: 78.2 % — HIGH (ref 43–77)
PLATELET # BLD AUTO: 423 K/UL — HIGH (ref 150–400)
POTASSIUM SERPL-MCNC: 4.2 MMOL/L — SIGNIFICANT CHANGE UP (ref 3.5–5.3)
POTASSIUM SERPL-SCNC: 4.2 MMOL/L — SIGNIFICANT CHANGE UP (ref 3.5–5.3)
PROT SERPL-MCNC: 8.7 GM/DL — HIGH (ref 6–8.3)
RBC # BLD: 5.02 M/UL — SIGNIFICANT CHANGE UP (ref 3.8–5.2)
RBC # FLD: 12.3 % — SIGNIFICANT CHANGE UP (ref 10.3–14.5)
SODIUM SERPL-SCNC: 128 MMOL/L — LOW (ref 135–145)
TROPONIN I, HIGH SENSITIVITY RESULT: 3.12 NG/L — SIGNIFICANT CHANGE UP
WBC # BLD: 9.05 K/UL — SIGNIFICANT CHANGE UP (ref 3.8–10.5)
WBC # FLD AUTO: 9.05 K/UL — SIGNIFICANT CHANGE UP (ref 3.8–10.5)

## 2024-02-18 PROCEDURE — 84300 ASSAY OF URINE SODIUM: CPT

## 2024-02-18 PROCEDURE — 84100 ASSAY OF PHOSPHORUS: CPT

## 2024-02-18 PROCEDURE — 70551 MRI BRAIN STEM W/O DYE: CPT

## 2024-02-18 PROCEDURE — 83935 ASSAY OF URINE OSMOLALITY: CPT

## 2024-02-18 PROCEDURE — 93306 TTE W/DOPPLER COMPLETE: CPT

## 2024-02-18 PROCEDURE — 36415 COLL VENOUS BLD VENIPUNCTURE: CPT

## 2024-02-18 PROCEDURE — 86038 ANTINUCLEAR ANTIBODIES: CPT

## 2024-02-18 PROCEDURE — 70547 MR ANGIOGRAPHY NECK W/O DYE: CPT

## 2024-02-18 PROCEDURE — 72125 CT NECK SPINE W/O DYE: CPT | Mod: 26,MA

## 2024-02-18 PROCEDURE — 80053 COMPREHEN METABOLIC PANEL: CPT

## 2024-02-18 PROCEDURE — 83930 ASSAY OF BLOOD OSMOLALITY: CPT

## 2024-02-18 PROCEDURE — 85652 RBC SED RATE AUTOMATED: CPT

## 2024-02-18 PROCEDURE — 85027 COMPLETE CBC AUTOMATED: CPT

## 2024-02-18 PROCEDURE — 83735 ASSAY OF MAGNESIUM: CPT

## 2024-02-18 PROCEDURE — 97116 GAIT TRAINING THERAPY: CPT | Mod: GP

## 2024-02-18 PROCEDURE — 87086 URINE CULTURE/COLONY COUNT: CPT

## 2024-02-18 PROCEDURE — 81003 URINALYSIS AUTO W/O SCOPE: CPT

## 2024-02-18 PROCEDURE — 80048 BASIC METABOLIC PNL TOTAL CA: CPT

## 2024-02-18 PROCEDURE — 85025 COMPLETE CBC W/AUTO DIFF WBC: CPT

## 2024-02-18 PROCEDURE — 99285 EMERGENCY DEPT VISIT HI MDM: CPT

## 2024-02-18 PROCEDURE — 71275 CT ANGIOGRAPHY CHEST: CPT

## 2024-02-18 PROCEDURE — 97162 PT EVAL MOD COMPLEX 30 MIN: CPT | Mod: GP

## 2024-02-18 PROCEDURE — 95816 EEG AWAKE AND DROWSY: CPT

## 2024-02-18 PROCEDURE — 86140 C-REACTIVE PROTEIN: CPT

## 2024-02-18 PROCEDURE — 84295 ASSAY OF SERUM SODIUM: CPT

## 2024-02-18 PROCEDURE — 0225U NFCT DS DNA&RNA 21 SARSCOV2: CPT

## 2024-02-18 PROCEDURE — 71045 X-RAY EXAM CHEST 1 VIEW: CPT | Mod: 26

## 2024-02-18 PROCEDURE — 93880 EXTRACRANIAL BILAT STUDY: CPT

## 2024-02-18 PROCEDURE — 70450 CT HEAD/BRAIN W/O DYE: CPT | Mod: 26,MA

## 2024-02-18 PROCEDURE — 70544 MR ANGIOGRAPHY HEAD W/O DYE: CPT

## 2024-02-18 PROCEDURE — 93010 ELECTROCARDIOGRAM REPORT: CPT

## 2024-02-18 PROCEDURE — 86036 ANCA SCREEN EACH ANTIBODY: CPT

## 2024-02-18 PROCEDURE — 84133 ASSAY OF URINE POTASSIUM: CPT

## 2024-02-18 PROCEDURE — 86780 TREPONEMA PALLIDUM: CPT

## 2024-02-18 RX ORDER — MECLIZINE HCL 12.5 MG
25 TABLET ORAL ONCE
Refills: 0 | Status: COMPLETED | OUTPATIENT
Start: 2024-02-18 | End: 2024-02-18

## 2024-02-18 RX ORDER — ACETAMINOPHEN 500 MG
1000 TABLET ORAL ONCE
Refills: 0 | Status: COMPLETED | OUTPATIENT
Start: 2024-02-18 | End: 2024-02-18

## 2024-02-18 RX ORDER — ACETAMINOPHEN 500 MG
650 TABLET ORAL ONCE
Refills: 0 | Status: DISCONTINUED | OUTPATIENT
Start: 2024-02-18 | End: 2024-02-21

## 2024-02-18 RX ORDER — SODIUM CHLORIDE 9 MG/ML
1000 INJECTION INTRAMUSCULAR; INTRAVENOUS; SUBCUTANEOUS
Refills: 0 | Status: DISCONTINUED | OUTPATIENT
Start: 2024-02-18 | End: 2024-02-19

## 2024-02-18 RX ADMIN — Medication 1000 MILLIGRAM(S): at 22:21

## 2024-02-18 RX ADMIN — Medication 25 MILLIGRAM(S): at 22:21

## 2024-02-18 NOTE — ED PROVIDER NOTE - PROGRESS NOTE DETAILS
Attending Nello: CTs w/ no emergent findings. Sodium 128. No prior on file. son w/ paperwork from November 2023 w/ Na 137. unclear if acute or chronic hyponatremia, but will treat as acute. urine/serum osms and urine sodium order. will PO fluid restrict. will start on very gentle maintenance IVF as to avoid overcorrection. son and pt agreeable w/ admission. endorsed to hospitalist Dr. Alba

## 2024-02-18 NOTE — ED PROVIDER NOTE - OBJECTIVE STATEMENT
84 year old female with a PMHx of memory disorder, undifferentiated somatoform disorder, Crohn's disease, osteoporosis; presents to ED per son at bedside s/p syncope with fall, son states  as he was assisting patient to her bedroom, lost her balance and rolled on the floor; when called out for her she responded minimally. Also endorsing dizziness, HA, weakness x3 days since starting new medication to treat her memory disorder. Extensive workup for dizziness and memory issues over the past few years. Recent MRI with age-appropriate changes. 84 year old female with a PMHx of memory disorder, undifferentiated somatoform disorder, Crohn's disease, osteoporosis; presents to ED per son at bedside s/p syncope with fall, son states  as he was assisting patient to her bedroom, lost her balance and rolled on the floor; when called out for her she was unconscious and took stimulation to wake up. Also endorsing dizziness, HA, weakness worsening x3 days since starting new medication to treat her memory disorder. Extensive workup for dizziness and memory issues outpatient w/ PCP and neurologist over the past few years. Son provided paperwork w/ recent MRI that showed age-appropriate changes.

## 2024-02-18 NOTE — ED ADULT NURSE NOTE - CHIEF COMPLAINT QUOTE
Pt presents to [facility] complaining of dizziness. As per EMS pts son called stating pt had a possible syncopal episode. Pt denying pain. Pt a & O x2 confused to time. BEFAST negative.

## 2024-02-18 NOTE — ED ADULT NURSE NOTE - NSFALLHARMRISKINTERV_ED_ALL_ED
Assistance OOB with selected safe patient handling equipment if applicable/Assistance with ambulation/Communicate risk of Fall with Harm to all staff, patient, and family/Encourage patient to sit up slowly, dangle for a short time, stand at bedside before walking/Monitor gait and stability/Orthostatic vital signs/Provide visual cue: red socks, yellow wristband, yellow gown, etc/Reinforce activity limits and safety measures with patient and family/Bed in lowest position, wheels locked, appropriate side rails in place/Call bell, personal items and telephone in reach/Instruct patient to call for assistance before getting out of bed/chair/stretcher/Non-slip footwear applied when patient is off stretcher/[place] to call system/Physically safe environment - no spills, clutter or unnecessary equipment/Purposeful Proactive Rounding/Room/bathroom lighting operational, light cord in reach

## 2024-02-18 NOTE — ED PROVIDER NOTE - CLINICAL SUMMARY MEDICAL DECISION MAKING FREE TEXT BOX
84 year old female with a PMHx of memory disorder, undifferentiated somatoform disorder, Crohn's disease, osteoporosis; presents to ED per son at bedside s/p syncope with fall, son states  as he was assisting patient to her bedroom, lost her balance and rolled on the floor; when called out for her she was unconscious and took stimulation to wake up. Also endorsing dizziness, HA, weakness worsening x3 days since starting new medication to treat her memory disorder. Extensive workup for dizziness and memory issues outpatient w/ PCP and neurologist over the past few years. Son provided paperwork w/ recent MRI that showed age-appropriate changes. Pt overall no acute distress. Will eval for metabolic vs. infectious cause. Will obtain CTH to ensure no acute intracranial pathology. Possible peripheral vertigo. Eval for cardiac cause. Plan for labs, imaging, meds, EKG. Will reassess the need for additional interventions as clinically warranted. Refer to any progress notes for updates on clinical course and as a continuation of this MDM.

## 2024-02-18 NOTE — ED ADULT TRIAGE NOTE - WEIGHT IN LBS
Visual Fields: C24-2 with SEB-Standard  Right eye: 7% false negatives, mild nasal suppression; slightly worse compared with the 12/2016 visual field test.   Left eye: Reliable, overall normal; stable compared with the 12/2016 visual field test.         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 The visual field tests are overall normal and stable in each eye. Doubt true glaucoma in this patient.    PLAN:  - Reviewed today's findings with the patient by letter.  - Continue to monitor without topical glaucoma treatment.  - Continue Refresh Optive Иван-3 both eyes 4 times a day as needed for irritation/dryness.  - Return to clinic 7/12/2021 - complete eye exam and optic nerve optical coherence tomography scan for myopic astigmatism with presbyopia both eyes and primary open angle glaucoma suspect with history of ocular hypertension both eyes.     130

## 2024-02-18 NOTE — ED ADULT NURSE NOTE - OBJECTIVE STATEMENT
Patient is a 83 y/o female who presents to the ED with c/o dizziness and possible syncope.  As per EMS pts son called stating pt had a possible syncopal episode. Pt denying pain. Pt a & O x2 confused to time.

## 2024-02-18 NOTE — ED ADULT TRIAGE NOTE - CHIEF COMPLAINT QUOTE
Pt presents to [facility] complaining of dizziness. As per EMS pts son called stating pt had a possible syncopal episode. Pt denying pain. Pt a & O x2 confused to time. BEFAST negative.

## 2024-02-18 NOTE — ED PROVIDER NOTE - NS_ ATTENDINGSCRIBEDETAILS _ED_A_ED_FT
Attending Nena: The scribe's documentation has been prepared under my direction and personally reviewed by me in its entirety. I confirm that the note above accurately reflects all work, treatment, procedures, and medical decision making performed by me.

## 2024-02-18 NOTE — ED ADULT NURSE NOTE - NS ED NURSE REPORT GIVEN TO FT
RN 3N Melolabial Interpolation Flap Text: A decision was made to reconstruct the defect utilizing an interpolation axial flap and a staged reconstruction.  A telfa template was made of the defect.  This telfa template was then used to outline the melolabial interpolation flap.  The donor area for the pedicle flap was then injected with anesthesia.  The flap was excised through the skin and subcutaneous tissue down to the layer of the underlying musculature.  The pedicle flap was carefully excised within this deep plane to maintain its blood supply.  The edges of the donor site were undermined.   The donor site was closed in a primary fashion.  The pedicle was then rotated into position and sutured.  Once the tube was sutured into place, adequate blood supply was confirmed with blanching and refill.  The pedicle was then wrapped with xeroform gauze and dressed appropriately with a telfa and gauze bandage to ensure continued blood supply and protect the attached pedicle.

## 2024-02-18 NOTE — ED PROVIDER NOTE - PHYSICAL EXAMINATION
constant
Gen: NAD, AOx3, able to make needs known, non-toxic  Head: NCAT  HEENT: EOMI, oral mucosa moist, normal conjunctiva  Lung: no respiratory distress, CTAB, no wheezes/rhonchi/rales B/L, speaking in full sentences  CV: RRR, no murmurs  Abd: non distended, soft, nontender, no guarding, no CVA tenderness  MSK: no visible deformities, no midline spinal tenderness  Neuro: Appears non focal, finger to nose B/L, speech clear  Skin: Warm, well perfused  Psych: normal affect

## 2024-02-19 DIAGNOSIS — Z87.19 PERSONAL HISTORY OF OTHER DISEASES OF THE DIGESTIVE SYSTEM: ICD-10-CM

## 2024-02-19 DIAGNOSIS — R42 DIZZINESS AND GIDDINESS: ICD-10-CM

## 2024-02-19 DIAGNOSIS — I10 ESSENTIAL (PRIMARY) HYPERTENSION: ICD-10-CM

## 2024-02-19 DIAGNOSIS — E87.1 HYPO-OSMOLALITY AND HYPONATREMIA: ICD-10-CM

## 2024-02-19 DIAGNOSIS — M81.0 AGE-RELATED OSTEOPOROSIS WITHOUT CURRENT PATHOLOGICAL FRACTURE: ICD-10-CM

## 2024-02-19 DIAGNOSIS — K21.9 GASTRO-ESOPHAGEAL REFLUX DISEASE WITHOUT ESOPHAGITIS: ICD-10-CM

## 2024-02-19 DIAGNOSIS — F32.9 MAJOR DEPRESSIVE DISORDER, SINGLE EPISODE, UNSPECIFIED: ICD-10-CM

## 2024-02-19 DIAGNOSIS — R41.3 OTHER AMNESIA: ICD-10-CM

## 2024-02-19 DIAGNOSIS — R55 SYNCOPE AND COLLAPSE: ICD-10-CM

## 2024-02-19 LAB
ADD ON TEST-SPECIMEN IN LAB: SIGNIFICANT CHANGE UP
ALBUMIN SERPL ELPH-MCNC: 3.4 G/DL — SIGNIFICANT CHANGE UP (ref 3.3–5)
ALP SERPL-CCNC: 81 U/L — SIGNIFICANT CHANGE UP (ref 40–120)
ALT FLD-CCNC: 18 U/L — SIGNIFICANT CHANGE UP (ref 12–78)
ANION GAP SERPL CALC-SCNC: 4 MMOL/L — LOW (ref 5–17)
ANION GAP SERPL CALC-SCNC: 6 MMOL/L — SIGNIFICANT CHANGE UP (ref 5–17)
APPEARANCE UR: CLEAR — SIGNIFICANT CHANGE UP
AST SERPL-CCNC: 19 U/L — SIGNIFICANT CHANGE UP (ref 15–37)
BASOPHILS # BLD AUTO: 0.07 K/UL — SIGNIFICANT CHANGE UP (ref 0–0.2)
BASOPHILS NFR BLD AUTO: 0.7 % — SIGNIFICANT CHANGE UP (ref 0–2)
BILIRUB SERPL-MCNC: 0.8 MG/DL — SIGNIFICANT CHANGE UP (ref 0.2–1.2)
BILIRUB UR-MCNC: NEGATIVE — SIGNIFICANT CHANGE UP
BUN SERPL-MCNC: 18 MG/DL — SIGNIFICANT CHANGE UP (ref 7–23)
BUN SERPL-MCNC: 21 MG/DL — SIGNIFICANT CHANGE UP (ref 7–23)
CALCIUM SERPL-MCNC: 8.6 MG/DL — SIGNIFICANT CHANGE UP (ref 8.5–10.1)
CALCIUM SERPL-MCNC: 9.4 MG/DL — SIGNIFICANT CHANGE UP (ref 8.5–10.1)
CHLORIDE SERPL-SCNC: 94 MMOL/L — LOW (ref 96–108)
CHLORIDE SERPL-SCNC: 96 MMOL/L — SIGNIFICANT CHANGE UP (ref 96–108)
CO2 SERPL-SCNC: 25 MMOL/L — SIGNIFICANT CHANGE UP (ref 22–31)
CO2 SERPL-SCNC: 29 MMOL/L — SIGNIFICANT CHANGE UP (ref 22–31)
COLOR SPEC: YELLOW — SIGNIFICANT CHANGE UP
CREAT SERPL-MCNC: 0.95 MG/DL — SIGNIFICANT CHANGE UP (ref 0.5–1.3)
CREAT SERPL-MCNC: 1.24 MG/DL — SIGNIFICANT CHANGE UP (ref 0.5–1.3)
CRP SERPL-MCNC: <3 MG/L — SIGNIFICANT CHANGE UP
DIFF PNL FLD: NEGATIVE — SIGNIFICANT CHANGE UP
EGFR: 43 ML/MIN/1.73M2 — LOW
EGFR: 59 ML/MIN/1.73M2 — LOW
EOSINOPHIL # BLD AUTO: 0.09 K/UL — SIGNIFICANT CHANGE UP (ref 0–0.5)
EOSINOPHIL NFR BLD AUTO: 0.9 % — SIGNIFICANT CHANGE UP (ref 0–6)
ERYTHROCYTE [SEDIMENTATION RATE] IN BLOOD: 19 MM/HR — SIGNIFICANT CHANGE UP (ref 0–20)
GLUCOSE SERPL-MCNC: 97 MG/DL — SIGNIFICANT CHANGE UP (ref 70–99)
GLUCOSE SERPL-MCNC: 99 MG/DL — SIGNIFICANT CHANGE UP (ref 70–99)
GLUCOSE UR QL: NEGATIVE MG/DL — SIGNIFICANT CHANGE UP
HCT VFR BLD CALC: 39.1 % — SIGNIFICANT CHANGE UP (ref 34.5–45)
HGB BLD-MCNC: 13.3 G/DL — SIGNIFICANT CHANGE UP (ref 11.5–15.5)
IMM GRANULOCYTES NFR BLD AUTO: 0.4 % — SIGNIFICANT CHANGE UP (ref 0–0.9)
KETONES UR-MCNC: NEGATIVE MG/DL — SIGNIFICANT CHANGE UP
LEUKOCYTE ESTERASE UR-ACNC: NEGATIVE — SIGNIFICANT CHANGE UP
LYMPHOCYTES # BLD AUTO: 2.59 K/UL — SIGNIFICANT CHANGE UP (ref 1–3.3)
LYMPHOCYTES # BLD AUTO: 26.8 % — SIGNIFICANT CHANGE UP (ref 13–44)
MAGNESIUM SERPL-MCNC: 2 MG/DL — SIGNIFICANT CHANGE UP (ref 1.6–2.6)
MCHC RBC-ENTMCNC: 29.6 PG — SIGNIFICANT CHANGE UP (ref 27–34)
MCHC RBC-ENTMCNC: 34 GM/DL — SIGNIFICANT CHANGE UP (ref 32–36)
MCV RBC AUTO: 86.9 FL — SIGNIFICANT CHANGE UP (ref 80–100)
MONOCYTES # BLD AUTO: 0.81 K/UL — SIGNIFICANT CHANGE UP (ref 0–0.9)
MONOCYTES NFR BLD AUTO: 8.4 % — SIGNIFICANT CHANGE UP (ref 2–14)
NEUTROPHILS # BLD AUTO: 6.06 K/UL — SIGNIFICANT CHANGE UP (ref 1.8–7.4)
NEUTROPHILS NFR BLD AUTO: 62.8 % — SIGNIFICANT CHANGE UP (ref 43–77)
NITRITE UR-MCNC: NEGATIVE — SIGNIFICANT CHANGE UP
OSMOLALITY SERPL: 276 MOSMOL/KG — LOW (ref 280–301)
OSMOLALITY UR: 580 MOSM/KG — SIGNIFICANT CHANGE UP (ref 50–1200)
PH UR: 7.5 — SIGNIFICANT CHANGE UP (ref 5–8)
PHOSPHATE SERPL-MCNC: 4 MG/DL — SIGNIFICANT CHANGE UP (ref 2.5–4.5)
PLATELET # BLD AUTO: 342 K/UL — SIGNIFICANT CHANGE UP (ref 150–400)
POTASSIUM SERPL-MCNC: 3.7 MMOL/L — SIGNIFICANT CHANGE UP (ref 3.5–5.3)
POTASSIUM SERPL-MCNC: 4.2 MMOL/L — SIGNIFICANT CHANGE UP (ref 3.5–5.3)
POTASSIUM SERPL-SCNC: 3.7 MMOL/L — SIGNIFICANT CHANGE UP (ref 3.5–5.3)
POTASSIUM SERPL-SCNC: 4.2 MMOL/L — SIGNIFICANT CHANGE UP (ref 3.5–5.3)
POTASSIUM UR-SCNC: 59.5 MMOL/L — SIGNIFICANT CHANGE UP
PROT SERPL-MCNC: 7.2 GM/DL — SIGNIFICANT CHANGE UP (ref 6–8.3)
PROT UR-MCNC: NEGATIVE MG/DL — SIGNIFICANT CHANGE UP
RAPID RVP RESULT: SIGNIFICANT CHANGE UP
RBC # BLD: 4.5 M/UL — SIGNIFICANT CHANGE UP (ref 3.8–5.2)
RBC # FLD: 12 % — SIGNIFICANT CHANGE UP (ref 10.3–14.5)
SARS-COV-2 RNA SPEC QL NAA+PROBE: SIGNIFICANT CHANGE UP
SODIUM SERPL-SCNC: 124 MMOL/L — LOW (ref 135–145)
SODIUM SERPL-SCNC: 125 MMOL/L — LOW (ref 135–145)
SODIUM SERPL-SCNC: 126 MMOL/L — LOW (ref 135–145)
SODIUM SERPL-SCNC: 127 MMOL/L — LOW (ref 135–145)
SODIUM SERPL-SCNC: 129 MMOL/L — LOW (ref 135–145)
SODIUM UR-SCNC: 162 MMOL/L — SIGNIFICANT CHANGE UP
SP GR SPEC: 1.02 — SIGNIFICANT CHANGE UP (ref 1–1.03)
UROBILINOGEN FLD QL: 0.2 MG/DL — SIGNIFICANT CHANGE UP (ref 0.2–1)
WBC # BLD: 9.66 K/UL — SIGNIFICANT CHANGE UP (ref 3.8–10.5)
WBC # FLD AUTO: 9.66 K/UL — SIGNIFICANT CHANGE UP (ref 3.8–10.5)

## 2024-02-19 PROCEDURE — 95816 EEG AWAKE AND DROWSY: CPT | Mod: 26

## 2024-02-19 PROCEDURE — 93306 TTE W/DOPPLER COMPLETE: CPT | Mod: 26

## 2024-02-19 PROCEDURE — 99223 1ST HOSP IP/OBS HIGH 75: CPT

## 2024-02-19 PROCEDURE — 99222 1ST HOSP IP/OBS MODERATE 55: CPT

## 2024-02-19 PROCEDURE — 71275 CT ANGIOGRAPHY CHEST: CPT | Mod: 26

## 2024-02-19 PROCEDURE — 93880 EXTRACRANIAL BILAT STUDY: CPT | Mod: 26

## 2024-02-19 RX ORDER — ROSUVASTATIN CALCIUM 5 MG/1
5 TABLET ORAL AT BEDTIME
Refills: 0 | Status: DISCONTINUED | OUTPATIENT
Start: 2024-02-19 | End: 2024-02-21

## 2024-02-19 RX ORDER — MESALAMINE 400 MG
0 TABLET, DELAYED RELEASE (ENTERIC COATED) ORAL
Qty: 0 | Refills: 0 | DISCHARGE

## 2024-02-19 RX ORDER — BALSALAZIDE DISODIUM 750 MG/1
2 CAPSULE ORAL
Refills: 0 | DISCHARGE

## 2024-02-19 RX ORDER — ASPIRIN/CALCIUM CARB/MAGNESIUM 324 MG
81 TABLET ORAL DAILY
Refills: 0 | Status: DISCONTINUED | OUTPATIENT
Start: 2024-02-19 | End: 2024-02-21

## 2024-02-19 RX ORDER — ROSUVASTATIN CALCIUM 5 MG/1
1 TABLET ORAL
Refills: 0 | DISCHARGE

## 2024-02-19 RX ORDER — OMEPRAZOLE 10 MG/1
0 CAPSULE, DELAYED RELEASE ORAL
Qty: 0 | Refills: 0 | DISCHARGE

## 2024-02-19 RX ORDER — MECLIZINE HCL 12.5 MG
12.5 TABLET ORAL
Refills: 0 | Status: DISCONTINUED | OUTPATIENT
Start: 2024-02-19 | End: 2024-02-21

## 2024-02-19 RX ORDER — ADALIMUMAB 40MG/0.8ML
0 KIT SUBCUTANEOUS
Qty: 0 | Refills: 0 | DISCHARGE

## 2024-02-19 RX ORDER — TRIAMTERENE/HYDROCHLOROTHIAZID 75 MG-50MG
1 TABLET ORAL DAILY
Refills: 0 | Status: DISCONTINUED | OUTPATIENT
Start: 2024-02-19 | End: 2024-02-19

## 2024-02-19 RX ORDER — ONDANSETRON 8 MG/1
4 TABLET, FILM COATED ORAL EVERY 6 HOURS
Refills: 0 | Status: DISCONTINUED | OUTPATIENT
Start: 2024-02-19 | End: 2024-02-21

## 2024-02-19 RX ORDER — RISEDRONATE SODIUM 25.8; 4.2 MG/1; MG/1
0 TABLET, FILM COATED ORAL
Qty: 0 | Refills: 0 | DISCHARGE

## 2024-02-19 RX ORDER — SODIUM CHLORIDE 9 MG/ML
1000 INJECTION INTRAMUSCULAR; INTRAVENOUS; SUBCUTANEOUS
Refills: 0 | Status: DISCONTINUED | OUTPATIENT
Start: 2024-02-19 | End: 2024-02-21

## 2024-02-19 RX ORDER — HEPARIN SODIUM 5000 [USP'U]/ML
5000 INJECTION INTRAVENOUS; SUBCUTANEOUS EVERY 12 HOURS
Refills: 0 | Status: DISCONTINUED | OUTPATIENT
Start: 2024-02-19 | End: 2024-02-21

## 2024-02-19 RX ORDER — PANTOPRAZOLE SODIUM 20 MG/1
40 TABLET, DELAYED RELEASE ORAL
Refills: 0 | Status: DISCONTINUED | OUTPATIENT
Start: 2024-02-19 | End: 2024-02-21

## 2024-02-19 RX ORDER — LANOLIN ALCOHOL/MO/W.PET/CERES
5 CREAM (GRAM) TOPICAL AT BEDTIME
Refills: 0 | Status: DISCONTINUED | OUTPATIENT
Start: 2024-02-19 | End: 2024-02-21

## 2024-02-19 RX ADMIN — SODIUM CHLORIDE 60 MILLILITER(S): 9 INJECTION INTRAMUSCULAR; INTRAVENOUS; SUBCUTANEOUS at 03:04

## 2024-02-19 RX ADMIN — Medication 81 MILLIGRAM(S): at 13:59

## 2024-02-19 RX ADMIN — HEPARIN SODIUM 5000 UNIT(S): 5000 INJECTION INTRAVENOUS; SUBCUTANEOUS at 21:23

## 2024-02-19 RX ADMIN — SODIUM CHLORIDE 75 MILLILITER(S): 9 INJECTION INTRAMUSCULAR; INTRAVENOUS; SUBCUTANEOUS at 10:31

## 2024-02-19 RX ADMIN — Medication 12.5 MILLIGRAM(S): at 10:31

## 2024-02-19 RX ADMIN — HEPARIN SODIUM 5000 UNIT(S): 5000 INJECTION INTRAVENOUS; SUBCUTANEOUS at 10:32

## 2024-02-19 RX ADMIN — Medication 12.5 MILLIGRAM(S): at 21:22

## 2024-02-19 RX ADMIN — Medication 5 MILLIGRAM(S): at 22:39

## 2024-02-19 RX ADMIN — ROSUVASTATIN CALCIUM 5 MILLIGRAM(S): 5 TABLET ORAL at 22:39

## 2024-02-19 RX ADMIN — PANTOPRAZOLE SODIUM 40 MILLIGRAM(S): 20 TABLET, DELAYED RELEASE ORAL at 06:44

## 2024-02-19 NOTE — H&P ADULT - NSHPPHYSICALEXAM_GEN_ALL_CORE
T(C): 36.6 (02-18-24 @ 20:52), Max: 36.6 (02-18-24 @ 20:52)  HR: 61 (02-18-24 @ 20:52) (61 - 61)  BP: 132/99 (02-18-24 @ 20:52) (132/99 - 132/99)  RR: 18 (02-18-24 @ 20:52) (18 - 18)  SpO2: 98% (02-18-24 @ 20:52) (98% - 98%)    General: Well nourished, non-toxic  HEENT: non-traumatic, perrla, eomi  Cardio: s1s2 regular rate and rhythm  Lungs: comfortable breathing, clear to auscultation  Abdomen: Soft, non-tender, non-distended  Neuro: AOx4  Ext: Pulses +2 T(C): 36.6 (02-18-24 @ 20:52), Max: 36.6 (02-18-24 @ 20:52)  HR: 61 (02-18-24 @ 20:52) (61 - 61)  BP: 132/99 (02-18-24 @ 20:52) (132/99 - 132/99)  RR: 18 (02-18-24 @ 20:52) (18 - 18)  SpO2: 98% (02-18-24 @ 20:52) (98% - 98%)    General: Frail, well-kempt  HEENT: non-traumatic, perrla, eomi  Cardio: s1s2 regular rate and rhythm  Lungs: comfortable breathing, clear to auscultation  Abdomen: Soft, non-tender, non-distended  Neuro: AOx1  Ext: Pulses +2

## 2024-02-19 NOTE — CONSULT NOTE ADULT - TIME BILLING
long discussion with patients son and review of the outpatient studies and labs he brought in , including labs and neuro/ vestibular studie, pts exam/ and notes/ orders/ discussion with patients hospitalist Dr CHRISTOPHE Irizarry

## 2024-02-19 NOTE — H&P ADULT - PROBLEM SELECTOR PLAN 3
-Confirm home med in the morning. -Patient has history of being dizzy, as per discussion with ED Dr. family reported multiple head scans as outpatient with no evidence for etiology.  -On meclizine 12.5 BID as per Dr. first.    -On baclofen and pregabalin at home for pain.  -Will hold pregabalin and baclofen for now.   -C/w meclizine.   -Will ask neuro for evaluation.

## 2024-02-19 NOTE — H&P ADULT - PROBLEM SELECTOR PLAN 5
-Patient with cognitive defect on evaluation. Chronic known dementia.   -Fall and aspiration precautions. On Triamterene/HCTZ 37.5/25 anf crestor 5 as per bessy.  -C/w anti-htnsive.

## 2024-02-19 NOTE — CHART NOTE - NSCHARTNOTEFT_GEN_A_CORE
Patient seen and evaluated.  Admitted this morning.   VSS    Plan  -As documented in H&P  -MRI  -IV fluids  -Orthostatics noted

## 2024-02-19 NOTE — H&P ADULT - PROBLEM SELECTOR PLAN 1
-CTH and CT cervical spine noted above.   -F/u orthostatics  -TTE in am  -US doppler carotids.   -Fall precautions.   -Neuro checks. -As per  First recently started on baclofen.   -CTH and CT cervical spine noted above.   -F/u orthostatics  -TTE in am  -US doppler carotids.   -Fall precautions.   -Neuro checks.  -Will hold pregabalin and baclofen for now.

## 2024-02-19 NOTE — CONSULT NOTE ADULT - ASSESSMENT
84 year old female with a PMHx of memory disorder, undifferentiated somatoform disorder, Crohn's disease, osteoporosis; presents to ED per son at bedside s/p syncope with fall. Patient is awake, and alert however not oriented, keeps asking why she is here in the hospital and she doesn't remember how she got here. Reports being dizzy, otherwise history not reliable. HPI as per ED note: Son states  as he was assisting patient to her bedroom, lost her balance and rolled on the floor; when called out for her she was unconscious and took stimulation to wake up. Also endorsing dizziness, HA, weakness worsening x3 days since starting new medication to treat her memory disorder. Extensive workup for dizziness and memory issues outpatient w/ PCP and neurologist over the past few years. Son provided paperwork w/ recent MRI that showed age-appropriate changes.    Nephrology:  Above from chart and patients son  Patient is awake but non interactive/ falls asleep at times  Admitted after falling while being assisted walking w her son, eyes rolled back transient unresponsiveness  Pt was started on triamterene / HCTZ several days ago along with baclofen  In the ED found to be hyponatremic and started NS IV  Repeat labs improved    A/P  Hyponatremia- most likely due to triamterene/ HCTZ- now on hold  Other meds held pregabalin/  Colazal / baclofen   Repeat BMP at 10 pm  Reduce IV rate accordingly, NS at 75 ml/hr presently  Urine studies done but may not be helpful due to recent diuretic use ( Cait, U osm, Serum Osm)  Nov 2023 sodium level 139  Discussed with Dr CHRISTOPHE Irizarry   84 year old female with a PMHx of memory disorder, undifferentiated somatoform disorder, Crohn's disease, osteoporosis; presents to ED per son at bedside s/p syncope with fall. Patient is awake, and alert however not oriented, keeps asking why she is here in the hospital and she doesn't remember how she got here. Reports being dizzy, otherwise history not reliable. HPI as per ED note: Son states  as he was assisting patient to her bedroom, lost her balance and rolled on the floor; when called out for her she was unconscious and took stimulation to wake up. Also endorsing dizziness, HA, weakness worsening x3 days since starting new medication to treat her memory disorder. Extensive workup for dizziness and memory issues outpatient w/ PCP and neurologist over the past few years. Son provided paperwork w/ recent MRI that showed age-appropriate changes.    Nephrology:  Above from chart and patients son  Patient is awake but non interactive/ falls asleep at times  Admitted after falling while being assisted walking w her son, eyes rolled back transient unresponsiveness  Pt was started on triamterene / HCTZ several days ago along with baclofen  In the ED found to be hyponatremic and started NS IV  Repeat labs improved    A/P  Hyponatremia- most likely due to triamterene/ HCTZ- now on hold  Other meds held pregabalin/  Colazal / baclofen   Repeat BMP at 10 pm  Reduce IV rate accordingly, NS at 75 ml/hr presently  Urine studies done but may not be helpful due to recent diuretic use ( Cait, U osm, Serum Osm)  Nov 2023 sodium level 139  Will monitor for ANICETO due to IV Contrast today done for CT angio chest, con't NS for now for renal protection  Discussed with Dr CHRISTOPHE Irizarry

## 2024-02-19 NOTE — H&P ADULT - PROBLEM SELECTOR PLAN 2
-F/u Urine Osms and electrolytes  -Fluid restrict for now.   -Trend Na q8h, ordered for 24 hours.   -Neuro checks. -F/u Urine Osms and electrolytes  -Trend Na q8h, ordered for 24 hours.   -C/w NS maintenance fluids.   -Neuro checks. -F/u Urine Osms and electrolytes  -Trend Na q8h, ordered for 24 hours.   -Fluid restriction.  -Nephrology consult placed.   -Neuro checks.

## 2024-02-19 NOTE — H&P ADULT - ASSESSMENT
84F admitted to medicine for syncope and hyponatremia.  84F admitted to medicine for syncope and hyponatremia.     Patient doesn't remember her meds, and will defer calling family late at night, please confirm med rec in am.  84F admitted to medicine for syncope and hyponatremia.     Patient doesn't remember her meds, Medrec as per Dr. Fletcher, will defer calling family late at night, please confirm med rec in am.

## 2024-02-19 NOTE — PHYSICAL THERAPY INITIAL EVALUATION ADULT - GENERAL OBSERVATIONS, REHAB EVAL
The Pt was received on 3N in bed supine, calm, cooperative, and willing to participate with PT, +IV, +Tele. Patient with fair response to functional mobility trng and ambulation tx. Required CGA for mobility. Patient able to ambulate 5ft along EOB with RW support. Patient declined further ambulation d/t complaints of dizziness. Assisted back into bed supine and adjusted for comfort. The Pt was in NAD at end of tx.  Call bell, tray, and phone in place and within reach. NSG made aware.

## 2024-02-19 NOTE — PHYSICAL THERAPY INITIAL EVALUATION ADULT - ADDITIONAL COMMENTS
According to patient and electronic medical records, she resides at home with son. Patient states she was ambulatory without use of assistive device. Patient is a questionable historian.

## 2024-02-19 NOTE — H&P ADULT - PROBLEM SELECTOR PLAN 4
-CT cervical spine noted for varying degrees of foraminal and canal stenosis, severe at multiple levels.  -Chronic, PT evaluation.  -F/u with rheum or PCP as outpatient preferable. -On Colazal 750 mg cap, 2 cap BID as per Dr. castellano.   -Confirm home med in the morning.

## 2024-02-19 NOTE — H&P ADULT - NSHPLABSRESULTS_GEN_ALL_CORE
LABS:  cret                        14.8   9.05  )-----------( 423      ( 18 Feb 2024 21:14 )             44.1     02-18    128<L>  |  93<L>  |  19  ----------------------------<  129<H>  4.2   |  30  |  1.19    Ca    9.9      18 Feb 2024 21:14  Mg     2.0     02-18    TPro  8.7<H>  /  Alb  4.0  /  TBili  0.7  /  DBili  x   /  AST  32  /  ALT  29  /  AlkPhos  90  02-18    < from: CT Cervical Spine No Cont (02.18.24 @ 21:59) >    Head CT Findings:  The ventricles and sulci are prominent in size, compatible with   generalized parenchymal volume loss. No acute intracranial hemorrhage is   identified.  There is no extra-axial fluid collection. No mass effect or   midline shift is seen.  There is no evidence of acute territorial   infarct.  There are periventricular and subcortical white matter   hypodensities, which are nonspecific, but likely reflect mild   microvascular ischemic disease.  There is patchy opacification of the bilateral ethmoid sinuses. The   mastoid air cells are well aerated.  No acute osseous abnormality is seen.    Cervical Spine CT Findings:  Reversal of usual cervical lordosis may be related to muscle spasm or   positioning.  There is mild anterolisthesis at C2-C3 and C3-C4. There is   mild retrolisthesis at C4-C5 and C7-T1. There is no acute displaced   fracture.  There is no prevertebral soft tissue swelling.  The vertebral   body heights are maintained.    Multilevel degenerative disc disease, facet and uncinate hypertrophy and   disc bulges result in varying degrees of foraminal and canal stenosis,   severe at multiple levels.    Impression: No evidence of acute traumatic injury. Severe degenerative   changes in the cervical spine as described above.    --- End of Report ---    < end of copied text >

## 2024-02-19 NOTE — EEG REPORT - NS EEG TEXT BOX
*** [facility] ***   COMPREHENSIVE EPILEPSY CENTER   REPORT OF ROUTINE VIDEO EEG       Patient Name: PANKAJ JASSO  Age and : 84y (42)  MRN #: [id number]  Location: [place] [address]  Referring Physician: Milton Irizarry    Study Date: 24    _____________________________________________________________  TECHNICAL INFORMATION    Placement and Labeling of Electrodes:  The EEG was performed utilizing 20 channels referential EEG connections (coronal over temporal over parasagittal montage) using all standard 10-20 electrode placements with EKG.  Recording was at a sampling rate of 256 samples per second per channel.  Time synchronized digital video recording was done simultaneously with EEG recording.  A low light infrared camera was used for low light recording.  Trent and seizure detection algorithms were utilized.    _____________________________________________________________  HISTORY    Patient is a 84y old  Female who presents with a chief complaint of dizziness, syncope.    PERTINENT MEDICATION:  MEDICATIONS  (STANDING):  aspirin  chewable 81 milliGRAM(s) Oral daily  heparin   Injectable 5000 Unit(s) SubCutaneous every 12 hours  meclizine 12.5 milliGRAM(s) Oral two times a day  pantoprazole    Tablet 40 milliGRAM(s) Oral before breakfast  rosuvastatin 5 milliGRAM(s) Oral at bedtime  sodium chloride 0.9%. 1000 milliLiter(s) (75 mL/Hr) IV Continuous <Continuous>    _____________________________________________________________  STUDY INTERPRETATION    Findings: The background was continuous and reactive. During wakefulness, the posterior dominant rhythm consisted of symmetric, poorly-modulated 6.5Hz activity.    Background Slowing:  Continuous diffuse irregular theta/delta activity.    Focal Slowing:   None were present.    Sleep Background:  Drowsiness was characterized by fragmentation, attenuation, and slowing of the background activity.    Stage II sleep transients were not recorded.    Other Non-Epileptiform Findings:  None were present.    Interictal Epileptiform Activity:   None were present.    Events:  Clinical events: None recorded.  Seizures: None recorded.    Activation Procedures:   Hyperventilation was not performed.    Photic stimulation was performed and did not elicit any abnormality.     Artifacts:  Intermittent myogenic and movement artifacts were noted.    ECG:  The heart rate on single channel ECG was predominantly between 60-80 BPM.    _____________________________________________________________  **EEG SUMMARY/CLASSIFICATION**    Abnormal EEG in an awake, drowsy patient.  - Background slowing, generalized.  __________________________________________________________  **EEG IMPRESSION/CLINICAL CORRELATE**    Abnormal EEG study.  - Mild to moderate nonspecific diffuse or multifocal cerebral dysfunction.   - No epileptiform patterns or seizures recorded.  _____________________________________________________________      Ming Kennedy MD, [place]  Attending Physician, [facility]  ------------------------------------  EEG Reading Room: [address] [place] [address]  Epilepsy Answering Service after 5PM and before 8:30AM: Ph#: [phone]  Also reachable via TEAMS

## 2024-02-19 NOTE — H&P ADULT - PROBLEM SELECTOR PLAN 6
-Hx as per EMR records, please confirm meds in the morning. -CT cervical spine noted for varying degrees of foraminal and canal stenosis, severe at multiple levels.  -Chronic, PT evaluation.  -F/u with rheum or PCP as outpatient preferable.

## 2024-02-19 NOTE — PHYSICAL THERAPY INITIAL EVALUATION ADULT - PERTINENT HX OF CURRENT PROBLEM, REHAB EVAL
84 year old female with a PMHx of memory disorder, undifferentiated somatoform disorder, Crohn's disease, osteoporosis; presents to ED per son at bedside s/p syncope with fall. Patient is awake, and alert however not oriented, keeps asking why she is here in the hospital and she doesn't remember how she got here. Reports being dizzy, otherwise history not reliable. HPI as per ED note: Son states  as he was assisting patient to her bedroom, lost her balance and rolled on the floor; when called out for her she was unconscious and took stimulation to wake up. Also endorsing dizziness, HA, weakness worsening x3 days since starting new medication to treat her memory disorder. Extensive workup for dizziness and memory issues outpatient w/ PCP and neurologist over the past few years. Son provided paperwork w/ recent MRI that showed age-appropriate changes.

## 2024-02-19 NOTE — PATIENT PROFILE ADULT - TOBACCO USE
[Feeling Fatigued] : feeling fatigued [Dyspnea on exertion] : dyspnea during exertion [Chest Discomfort] : chest discomfort [Lower Ext Edema] : lower extremity edema [Palpitations] : palpitations [PND] : PND [Cough] : cough [Easy Bleeding] : a tendency for easy bleeding [Dizziness] : dizziness [Negative] : Heme/Lymph [Fever] : no fever Former smoker [Chills] : no chills [Blurry Vision] : no blurred vision [Abdominal Pain] : no abdominal pain [Nausea] : no nausea [Change in Appetite] : no change in appetite [Urinary Frequency] : no change in urinary frequency [Joint Pain] : no joint pain [FreeTextEntry4] : hoarse throat since ALYSON

## 2024-02-19 NOTE — PATIENT PROFILE ADULT - FALL HARM RISK - HARM RISK INTERVENTIONS
See scanned report.  
Assistance with ambulation/Assistance OOB with selected safe patient handling equipment/Communicate Risk of Fall with Harm to all staff/Discuss with provider need for PT consult/Monitor for mental status changes/Monitor gait and stability/Orthostatic vital signs/Provide patient with walking aids - walker, cane, crutches/Reinforce activity limits and safety measures with patient and family/Reorient to person, place and time as needed/Review medications for side effects contributing to fall risk/Sit up slowly, dangle for a short time, stand at bedside before walking/Tailored Fall Risk Interventions/Toileting schedule using arm’s reach rule for commode and bathroom/Use of alarms - bed, chair and/or voice tab/Visual Cue: Yellow wristband and red socks/Bed in lowest position, wheels locked, appropriate side rails in place/Call bell, personal items and telephone in reach/Instruct patient to call for assistance before getting out of bed or chair/Non-slip footwear when patient is out of bed/[place] to call system/Physically safe environment - no spills, clutter or unnecessary equipment/Purposeful Proactive Rounding/Room/bathroom lighting operational, light cord in reach

## 2024-02-19 NOTE — CONSULT NOTE ADULT - ASSESSMENT
84 year old woman with dementia, Crohn's disease, presenting after an episode of loss of consciousness at home, with persistent complaint of dizziness.  On exam, she does have positive orthostatic vitals, but she also has pathological nystagmus, with vertical nystagmus on upgaze.  No other neuro findings on exam.  Imaging thus far unremarkable.   Given the pathological nystagmus, and the vertiginous complaints, will need to rule out a posterior circulation lesion.  The symptoms could be related to her orthostasis, but a cerebrovascular cause needs to be assessed as well.   -will obtain an MRI brain non contrast, as well as MRA H&N  -for now, would start on ASA 81mg daily.  Can DC if no stroke on the MRI.    -neurology to review and provide further recommendations pending MR results.    -Please page or call with any acute changes, or other issues we can help address.

## 2024-02-19 NOTE — H&P ADULT - PROBLEM SELECTOR PLAN 7
-Pantoprazole -Patient with cognitive defect on evaluation. Chronic known dementia.   -Fall and aspiration precautions.

## 2024-02-19 NOTE — H&P ADULT - HISTORY OF PRESENT ILLNESS
84 year old female with a PMHx of memory disorder, undifferentiated somatoform disorder, Crohn's disease, osteoporosis; presents to ED per son at bedside s/p syncope with fall, son states  as he was assisting patient to her bedroom, lost her balance and rolled on the floor; when called out for her she was unconscious and took stimulation to wake up. Also endorsing dizziness, HA, weakness worsening x3 days since starting new medication to treat her memory disorder. Extensive workup for dizziness and memory issues outpatient w/ PCP and neurologist over the past few years. Son provided paperwork w/ recent MRI that showed age-appropriate changes. 84 year old female with a PMHx of memory disorder, undifferentiated somatoform disorder, Crohn's disease, osteoporosis; presents to ED per son at bedside s/p syncope with fall. Patient is awake, and alert however not oriented, keeps asking why she is here in the hospital and she doesn't remember how she got here. Reports being dizzy, otherwise history not reliable. HPI as per ED note: Son states  as he was assisting patient to her bedroom, lost her balance and rolled on the floor; when called out for her she was unconscious and took stimulation to wake up. Also endorsing dizziness, HA, weakness worsening x3 days since starting new medication to treat her memory disorder. Extensive workup for dizziness and memory issues outpatient w/ PCP and neurologist over the past few years. Son provided paperwork w/ recent MRI that showed age-appropriate changes.

## 2024-02-19 NOTE — H&P ADULT - NSHPREVIEWOFSYSTEMS_GEN_ALL_CORE
Denies headaches, blurred vision, diplopia, dizziness, fall.  Denies fevers, chills, rhinitis, sore throat, cough, diarrhea, rash.   Denies Chest pain, Shortness of breath, PND, Orthopnea, Palpitations, Syncope.   Denies Abdominal pain, flank pain, constipation, dysuria, changes in urinary habits.  Denies smoking, illicit drug use, alcohol addiction.   Denies numbness and tingling in extremities, denies swelling in legs.   Denies hematemesis, hematochezia, melena, hematuria.

## 2024-02-19 NOTE — CONSULT NOTE ADULT - SUBJECTIVE AND OBJECTIVE BOX
84 year old woman with dementia, Crohn's disease, osteoporosis, admitted to  after an episode of LOC at home.  Patient has limited recollection of the incident.  The history is derived from chart review.  Her son reported to ED staff that in the evening on 2/18, he was assisting the patient to her bedroom.  She lost her balance, and rolled onto the floor.  She was unconscious when he checked, and required stimulation to wake her up.  Unclear duration of unconsciousness.  No report of any convulsive activity.      On my evaluation, the patient's major complaint is dizziness.  She says she feels as though she is spinning.  It is constant.  She is unclear when the symptoms began, but she thinks it is a new problem.  She does not find any positional component to it, but she endorses that she doesn't think she can stand up.  She does also report blurred vision, and says she feels diffusely weak, and clumsy.  No difficulty with speech, facial asymmetry, swallowing difficulty.  No focal weakness or sensory loss in the arms or legs.  She denies medical complaints, including chest pain, shortness of breath.  She denies headache, abdominal pain, swelling in the extremities, or pain in the joints.      On exam:   GEN NAD  CV: RRR, S1, S2  PULM: CTAB  GI: soft, non tender  EXTREM: no CCE  HEENT: no nuchal rigidity or temporal tenderness  SKIN: no rashes.   NEURO:   Awake, alert, disoriented to month, year, hospital.  Normal naming, and repetition.  Has impaired insight, not sure why she is in hospital or how she came to be here.    Pupils 3-2mm, symmetric, full VF's, vertical nystagmus on upgaze, no nystagmus in any other direction of gaze, conjugate, no facial weakness, no dysarthria, tongue midline.   MOTOR: normal bulk and tone, no drift, 5/5 , wrist extension, 4+/5 biceps on the L, 5/5 on the R, 5/5 deltoids and triceps.  5/5 hip flexors, and knee extensors.   SENSORY: intact symmetrically to light touch  COORDINATION: normal FNF    CTH images reviewed: no hemorrhage, no large territorial infarct.     
NEPHROLOGY CONSULT  HPI:  84 year old female with a PMHx of memory disorder, undifferentiated somatoform disorder, Crohn's disease, osteoporosis; presents to ED per son at bedside s/p syncope with fall. Patient is awake, and alert however not oriented, keeps asking why she is here in the hospital and she doesn't remember how she got here. Reports being dizzy, otherwise history not reliable. HPI as per ED note: Son states  as he was assisting patient to her bedroom, lost her balance and rolled on the floor; when called out for her she was unconscious and took stimulation to wake up. Also endorsing dizziness, HA, weakness worsening x3 days since starting new medication to treat her memory disorder. Extensive workup for dizziness and memory issues outpatient w/ PCP and neurologist over the past few years. Son provided paperwork w/ recent MRI that showed age-appropriate changes.    Nephrology:  Above from chart and patients son  Patient is awake but non interactive/ falls asleep at times  Admitted after falling while being assisted walking w her son, eyes rolled back transient unresponsiveness  Pt was started on triamterene / HCTZ several days ago along with baclofen  In the ED found to be hyponatremic and started NS IV  Repeat labs improved      PAST MEDICAL & SURGICAL HISTORY:  Undifferentiated somatoform disorder      Memory disorder      Major depressive disorder      IBD (inflammatory bowel disease)      Esophageal reflux      S/P tonsillectomy          FAMILY HISTORY:     MEDICATIONS  (STANDING):  aspirin  chewable 81 milliGRAM(s) Oral daily  heparin   Injectable 5000 Unit(s) SubCutaneous every 12 hours  meclizine 12.5 milliGRAM(s) Oral two times a day  pantoprazole    Tablet 40 milliGRAM(s) Oral before breakfast  rosuvastatin 5 milliGRAM(s) Oral at bedtime  sodium chloride 0.9%. 1000 milliLiter(s) (75 mL/Hr) IV Continuous <Continuous>    MEDICATIONS  (PRN):  acetaminophen     Tablet .. 650 milliGRAM(s) Oral once PRN Mild Pain (1 - 3)  ondansetron Injectable 4 milliGRAM(s) IV Push every 6 hours PRN Nausea      Allergies    No Known Allergies    Intolerances        I&O's Summary        REVIEW OF SYSTEMS:    UTO        Vital Signs Last 24 Hrs  T(C): 36.4 (19 Feb 2024 16:23), Max: 36.6 (19 Feb 2024 05:35)  T(F): 97.5 (19 Feb 2024 16:23), Max: 97.8 (19 Feb 2024 05:35)  HR: 65 (19 Feb 2024 16:23) (59 - 69)  BP: 125/66 (19 Feb 2024 16:23) (125/66 - 152/69)  BP(mean): --  RR: 18 (19 Feb 2024 16:23) (17 - 18)  SpO2: 96% (19 Feb 2024 16:23) (95% - 98%)    Parameters below as of 19 Feb 2024 16:23  Patient On (Oxygen Delivery Method): room air            Daily     Daily     I&O's Summary      PHYSICAL EXAM:    General:  non communicating but awake smiling    Neuro:  awake     HEENT:  No JVD, no masses, Eyes anicteric, ,    Cardiovascular:  Regular rate and rhythm, with normal S1 and S2.    Lungs:  clear. no rales, no wheezing, .    Abdomen:  Normoactive bowel sounds. Soft, flat, non-tender, and non-distended.  positive bowel sounds    Skin:  Warm, dry    Extremities:  warm,  no cyanosis    LABS:                                     13.3   9.66  )-----------( 342      ( 19 Feb 2024 06:59 )             39.1         127    |  x      |  x      ----------------------------<  x         19 Feb 2024 16:41  x       |  x      |  x        126    |  x      |  x      ----------------------------<  x         19 Feb 2024 11:43  x       |  x      |  x        125    |  94     |  18     ----------------------------<  99        19 Feb 2024 06:59  3.7     |  25     |  0.95     Ca    9.4        19 Feb 2024 06:59  Ca    9.9        18 Feb 2024 21:14    Phos  4.0       19 Feb 2024 06:59    Mg     2.0       19 Feb 2024 06:59  Mg     2.0       18 Feb 2024 21:14        Ca    9.4      19 Feb 2024 06:59  Phos  4.0     02-19  Mg     2.0     02-19    TPro  7.2  /  Alb  3.4  /  TBili  0.8  /  DBili  x   /  AST  19  /  ALT  18  /  AlkPhos  81  02-19      Urinalysis Basic - ( 19 Feb 2024 06:59 )    Color: x / Appearance: x / SG: x / pH: x  Gluc: 99 mg/dL / Ketone: x  / Bili: x / Urobili: x   Blood: x / Protein: x / Nitrite: x   Leuk Esterase: x / RBC: x / WBC x   Sq Epi: x / Non Sq Epi: x / Bacteria: x      Magnesium: 2.0 mg/dL (02-19 @ 06:59)  Phosphorus: 4.0 mg/dL (02-19 @ 06:59)

## 2024-02-20 LAB
ANION GAP SERPL CALC-SCNC: 4 MMOL/L — LOW (ref 5–17)
AUTO DIFF PNL BLD: ABNORMAL
BUN SERPL-MCNC: 23 MG/DL — SIGNIFICANT CHANGE UP (ref 7–23)
C-ANCA SER-ACNC: NEGATIVE — SIGNIFICANT CHANGE UP
CALCIUM SERPL-MCNC: 8.2 MG/DL — LOW (ref 8.5–10.1)
CHLORIDE SERPL-SCNC: 103 MMOL/L — SIGNIFICANT CHANGE UP (ref 96–108)
CO2 SERPL-SCNC: 25 MMOL/L — SIGNIFICANT CHANGE UP (ref 22–31)
CREAT SERPL-MCNC: 1 MG/DL — SIGNIFICANT CHANGE UP (ref 0.5–1.3)
CULTURE RESULTS: NO GROWTH — SIGNIFICANT CHANGE UP
EGFR: 56 ML/MIN/1.73M2 — LOW
GLUCOSE SERPL-MCNC: 85 MG/DL — SIGNIFICANT CHANGE UP (ref 70–99)
HCT VFR BLD CALC: 36.5 % — SIGNIFICANT CHANGE UP (ref 34.5–45)
HGB BLD-MCNC: 12.4 G/DL — SIGNIFICANT CHANGE UP (ref 11.5–15.5)
MCHC RBC-ENTMCNC: 30.1 PG — SIGNIFICANT CHANGE UP (ref 27–34)
MCHC RBC-ENTMCNC: 34 GM/DL — SIGNIFICANT CHANGE UP (ref 32–36)
MCV RBC AUTO: 88.6 FL — SIGNIFICANT CHANGE UP (ref 80–100)
MPO AB + PR3 PNL SER: SIGNIFICANT CHANGE UP
P-ANCA SER-ACNC: NEGATIVE — SIGNIFICANT CHANGE UP
PLATELET # BLD AUTO: 310 K/UL — SIGNIFICANT CHANGE UP (ref 150–400)
POTASSIUM SERPL-MCNC: 3.9 MMOL/L — SIGNIFICANT CHANGE UP (ref 3.5–5.3)
POTASSIUM SERPL-SCNC: 3.9 MMOL/L — SIGNIFICANT CHANGE UP (ref 3.5–5.3)
RBC # BLD: 4.12 M/UL — SIGNIFICANT CHANGE UP (ref 3.8–5.2)
RBC # FLD: 12.2 % — SIGNIFICANT CHANGE UP (ref 10.3–14.5)
SODIUM SERPL-SCNC: 132 MMOL/L — LOW (ref 135–145)
SPECIMEN SOURCE: SIGNIFICANT CHANGE UP
T PALLIDUM AB TITR SER: NEGATIVE — SIGNIFICANT CHANGE UP
WBC # BLD: 9.11 K/UL — SIGNIFICANT CHANGE UP (ref 3.8–10.5)
WBC # FLD AUTO: 9.11 K/UL — SIGNIFICANT CHANGE UP (ref 3.8–10.5)

## 2024-02-20 PROCEDURE — 70544 MR ANGIOGRAPHY HEAD W/O DYE: CPT | Mod: 26,XU

## 2024-02-20 PROCEDURE — 70547 MR ANGIOGRAPHY NECK W/O DYE: CPT | Mod: 26

## 2024-02-20 PROCEDURE — 99232 SBSQ HOSP IP/OBS MODERATE 35: CPT

## 2024-02-20 PROCEDURE — 70551 MRI BRAIN STEM W/O DYE: CPT | Mod: 26

## 2024-02-20 PROCEDURE — 99233 SBSQ HOSP IP/OBS HIGH 50: CPT

## 2024-02-20 PROCEDURE — 99231 SBSQ HOSP IP/OBS SF/LOW 25: CPT

## 2024-02-20 RX ORDER — RISEDRONATE SODIUM 25.8; 4.2 MG/1; MG/1
1 TABLET, FILM COATED ORAL
Refills: 0 | DISCHARGE

## 2024-02-20 RX ORDER — OMEPRAZOLE 10 MG/1
1 CAPSULE, DELAYED RELEASE ORAL
Refills: 0 | DISCHARGE

## 2024-02-20 RX ORDER — ADALIMUMAB 40MG/0.8ML
40 KIT SUBCUTANEOUS
Refills: 0 | DISCHARGE

## 2024-02-20 RX ORDER — MECLIZINE HCL 12.5 MG
1 TABLET ORAL
Refills: 0 | DISCHARGE

## 2024-02-20 RX ADMIN — HEPARIN SODIUM 5000 UNIT(S): 5000 INJECTION INTRAVENOUS; SUBCUTANEOUS at 21:18

## 2024-02-20 RX ADMIN — Medication 81 MILLIGRAM(S): at 10:16

## 2024-02-20 RX ADMIN — SODIUM CHLORIDE 40 MILLILITER(S): 9 INJECTION INTRAMUSCULAR; INTRAVENOUS; SUBCUTANEOUS at 21:17

## 2024-02-20 RX ADMIN — PANTOPRAZOLE SODIUM 40 MILLIGRAM(S): 20 TABLET, DELAYED RELEASE ORAL at 05:48

## 2024-02-20 RX ADMIN — Medication 12.5 MILLIGRAM(S): at 21:19

## 2024-02-20 RX ADMIN — HEPARIN SODIUM 5000 UNIT(S): 5000 INJECTION INTRAVENOUS; SUBCUTANEOUS at 10:16

## 2024-02-20 RX ADMIN — Medication 12.5 MILLIGRAM(S): at 10:16

## 2024-02-20 RX ADMIN — ROSUVASTATIN CALCIUM 5 MILLIGRAM(S): 5 TABLET ORAL at 21:18

## 2024-02-20 RX ADMIN — Medication 5 MILLIGRAM(S): at 21:18

## 2024-02-20 RX ADMIN — SODIUM CHLORIDE 75 MILLILITER(S): 9 INJECTION INTRAMUSCULAR; INTRAVENOUS; SUBCUTANEOUS at 05:47

## 2024-02-20 NOTE — PROGRESS NOTE ADULT - ASSESSMENT
84 year old female with a PMHx of memory disorder, undifferentiated somatoform disorder, Crohn's disease, osteoporosis; presents to ED per son at bedside s/p syncope with fall. Patient is awake, and alert however not oriented, keeps asking why she is here in the hospital and she doesn't remember how she got here. Reports being dizzy, otherwise history not reliable. HPI as per ED note: Son states  as he was assisting patient to her bedroom, lost her balance and rolled on the floor; when called out for her she was unconscious and took stimulation to wake up. Also endorsing dizziness, HA, weakness worsening x3 days since starting new medication to treat her memory disorder. Extensive workup for dizziness and memory issues outpatient w/ PCP and neurologist over the past few years. Son provided paperwork w/ recent MRI that showed age-appropriate changes.      #Near Syncope  -EKG: reviewed   -CTH and CT cervical spine noted above.   -orthostatic: positive, now improving   -US duplex: negative   -f/u on MRI   -Will hold pregabalin and baclofen for now.   -Meclizine PRN   -fall precautions   -neurochecks     #Hyponatremia with hypocholermia   -IV fluids   -improving   -currently on Triamterine and HCTZ, continue to hold   -trend Na+  -nephrology reccs noted     #HTN  -currently on Triamterene/HCTZ  -hold for now due to above     #HLD  -c/w statin     #History of Crohn's  -Due for Humira  -on Colazal     #Osteoporosis   -CT cervical spine noted for varying degrees of foraminal and canal stenosis, severe at multiple levels.  -Chronic,   -on Residronate   -PT evaluation.      #Cognitive Decline   -Hx of known dementia   -f/u on MRI   -outpateint f/u with Neurology     #GERD  -PPI     #VTE  -Heparin SubQ    84 year old female with a PMHx of memory disorder, undifferentiated somatoform disorder, Crohn's disease, osteoporosis; presents to ED per son at bedside s/p syncope with fall. Patient is awake, and alert however not oriented, keeps asking why she is here in the hospital and she doesn't remember how she got here. Reports being dizzy, otherwise history not reliable. HPI as per ED note: Son states  as he was assisting patient to her bedroom, lost her balance and rolled on the floor; when called out for her she was unconscious and took stimulation to wake up. Also endorsing dizziness, HA, weakness worsening x3 days since starting new medication to treat her memory disorder. Extensive workup for dizziness and memory issues outpatient w/ PCP and neurologist over the past few years. Son provided paperwork w/ recent MRI that showed age-appropriate changes.      #Near Syncope/Dizziness   -EKG: reviewed   -CTH and CT cervical spine noted above.   -orthostatic: positive  -US duplex: negative   -f/u on MRI   -Will hold pregabalin and baclofen for now.   -Meclizine PRN   -fall precautions   -neurochecks     #Hyponatremia with hypochloremia   -IV fluids   -improving   -currently on Triamterene and HCTZ, continue to hold   -trend Na+  -nephrology reccs noted     #HTN  -currently on Triamterene/HCTZ  -hold for now due to above     #HLD  -c/w statin     #History of Crohn's  -Due for Humira  -on Colazal     #Osteoporosis   -CT cervical spine noted for varying degrees of foraminal and canal stenosis, severe at multiple levels.  -Chronic,   -on Residronate   -PT evaluation.      #Cognitive Decline   -Hx of known dementia   -f/u on MRI   -outpateint f/u with Neurology     #GERD  -PPI     #VTE  -Heparin SubQ

## 2024-02-20 NOTE — PROGRESS NOTE ADULT - SUBJECTIVE AND OBJECTIVE BOX
HOSPITALIST ATTENDING PROGRESS NOTE     Chart and meds reviewed. Patient seen and examined     CC: dizziness     Subjective: Pt seen and evaluated. Reports feeling better. Awaiting MRI       All other systems and founds to be negative with exception of what has been described above.         PHYSICAL EXAM:  Vital Signs Last 24 Hrs  T(C): 36.7 (20 Feb 2024 16:32), Max: 36.8 (20 Feb 2024 12:00)  T(F): 98 (20 Feb 2024 16:32), Max: 98.2 (20 Feb 2024 12:00)  HR: 89 (20 Feb 2024 16:32) (67 - 89)  BP: 148/90 (20 Feb 2024 16:32) (112/63 - 148/90)  RR: 18 (20 Feb 2024 16:32) (17 - 18)  SpO2: 98% (20 Feb 2024 16:32) (92% - 98%)      GEN: NAD   HEENT: EOMI, normal hearing, moist mucous membranes  NECK : Soft and supple, no JVD  LUNG: CTABL, No wheezing, rales or rhonchi  CVS: S1S2+, RRR, no M/G/R  GI: BS+, soft, NT/ND, no guarding, no rebound  EXTREMITIES: No peripheral edema  VASCULAR: 2+ peripheral pulses  NEURO: AAOx3, grossly non-focal   MSK: 5/5 strength b/l upper and lower extremities  SKIN: No rashes    HOME MEDICATIONS:  Home Medications:  baclofen 10 mg oral tablet: 1 tab(s) orally once a day once a day for one week, than 2x a day. (19 Feb 2024 05:20)  Colazal 750 mg oral capsule: 2 cap(s) orally 2 times a day (19 Feb 2024 05:18)  Crestor 5 mg oral tablet: 1 tab(s) orally once a day (19 Feb 2024 05:18)  Humira 40 mg/0.8 mL subcutaneous kit: 40 milligram(s) subcutaneously every 2 weeks (20 Feb 2024 11:52)  meclizine 12.5 mg oral tablet: 1 tab(s) orally 2 times a day as needed for  dizziness (20 Feb 2024 11:52)  pregabalin 75 mg oral capsule: 1 cap(s) orally 2 times a day (19 Feb 2024 05:18)  PriLOSEC 20 mg oral delayed release capsule: 1 cap(s) orally once a day (20 Feb 2024 11:53)  risedronate 150 mg oral tablet: 1 tab(s) orally every 28 days (20 Feb 2024 11:53)  triamterene-hydrochlorothiazide 37.5 mg-25 mg oral capsule: 1 cap(s) orally once a day (19 Feb 2024 05:18)      MEDICATIONS  MEDICATIONS  (STANDING):  aspirin  chewable 81 milliGRAM(s) Oral daily  heparin   Injectable 5000 Unit(s) SubCutaneous every 12 hours  meclizine 12.5 milliGRAM(s) Oral two times a day  pantoprazole    Tablet 40 milliGRAM(s) Oral before breakfast  rosuvastatin 5 milliGRAM(s) Oral at bedtime  sodium chloride 0.9%. 1000 milliLiter(s) (75 mL/Hr) IV Continuous <Continuous>      LABS: All Labs Reviewed:                        12.4   9.11  )-----------( 310      ( 20 Feb 2024 07:13 )             36.5     02-20    132<L>  |  103  |  23  ----------------------------<  85  3.9   |  25  |  1.00    Ca    8.2<L>      20 Feb 2024 07:13  Phos  4.0     02-19  Mg     2.0     02-19    TPro  7.2  /  Alb  3.4  /  TBili  0.8  /  DBili  x   /  AST  19  /  ALT  18  /  AlkPhos  81  02-19        Urinalysis Basic - ( 20 Feb 2024 07:13 )    Color: x / Appearance: x / SG: x / pH: x  Gluc: 85 mg/dL / Ketone: x  / Bili: x / Urobili: x   Blood: x / Protein: x / Nitrite: x   Leuk Esterase: x / RBC: x / WBC x   Sq Epi: x / Non Sq Epi: x / Bacteria: x        Culture - Urine (collected 18 Feb 2024 23:58)  Source: Clean Catch None  Final Report (20 Feb 2024 07:05):    No growth      Blood Culture: 02-18 @ 23:58  Organism --  Gram Stain Blood -- Gram Stain --  Specimen Source Clean Catch None  Culture-Blood --      I&O's Detail    CAPILLARY BLOOD GLUCOSE  CARDIOLOGY TESTING   EKG : reviewed     ECHO   < from: TTE Echo Complete w/o Contrast w/ Doppler (02.19.24 @ 08:54) >   Summary     Estimated left ventricular ejection fraction is 60-65 %.   The left ventricle is normal in size, wall thickness, wall motion and   contractility as seen in limited views.   Normal appearing right ventricle structure and function.   The aortic valve is well visualized, appears mildly calcified. Valve   opening seems to be normal.   Moderate aortic regurgitation is present.   There is calcification of both mitral valve leaflets. The leaflet opening   is normal.   Mild (1+) mitral regurgitation is present.   EA reversal of the mitral inflow consistent with reduced compliance of   the   left ventricle.   Normal appearing tricuspid valve structure.   Moderate (2+) tricuspid valve regurgitation is present.          RADIOLOGY: reviewed    HOSPITALIST ATTENDING PROGRESS NOTE     Chart and meds reviewed. Patient seen and examined     CC: dizziness     Subjective: Pt seen and evaluated. Still with dizziness, mildly improved.  MRI pending       All other systems and founds to be negative with exception of what has been described above.         PHYSICAL EXAM:  Vital Signs Last 24 Hrs  T(C): 36.7 (20 Feb 2024 16:32), Max: 36.8 (20 Feb 2024 12:00)  T(F): 98 (20 Feb 2024 16:32), Max: 98.2 (20 Feb 2024 12:00)  HR: 89 (20 Feb 2024 16:32) (67 - 89)  BP: 148/90 (20 Feb 2024 16:32) (112/63 - 148/90)  RR: 18 (20 Feb 2024 16:32) (17 - 18)  SpO2: 98% (20 Feb 2024 16:32) (92% - 98%)    Vital Signs Last 24 Hrs  T(C): 36.7 (02-20-24 @ 16:32), Max: 36.8 (02-20-24 @ 12:00)  T(F): 98 (02-20-24 @ 16:32), Max: 98.2 (02-20-24 @ 12:00)  HR: 89 (02-20-24 @ 16:32) (67 - 89)  BP: 148/90 (02-20-24 @ 16:32) (112/63 - 148/90)  BP(mean): --  RR: 18 (02-20-24 @ 16:32) (17 - 18)  SpO2: 98% (02-20-24 @ 16:32) (92% - 98%)    Orthostatic VS  02-20-24 @ 05:45  Lying BP: 96/78 HR: 69  Sitting BP: 115/70 HR: 72  Standing BP: 123/80 HR: 82  Site: upper left arm  Mode: electronic  Orthostatic VS  02-19-24 @ 08:58  Lying BP: 151/67 HR: 54  Sitting BP: 150/76 HR: 60  Standing BP: 122/76 HR: 104  Site: --  Mode: --        GEN: NAD   HEENT: EOMI, normal hearing, moist mucous membranes  NECK : Soft and supple, no JVD  LUNG: CTABL, No wheezing, rales or rhonchi  CVS: S1S2+, RRR, no M/G/R  GI: BS+, soft, NT/ND, no guarding, no rebound  EXTREMITIES: No peripheral edema  VASCULAR: 2+ peripheral pulses  NEURO: AAOx3, grossly non-focal   MSK: 5/5 strength b/l upper and lower extremities  SKIN: No rashes    HOME MEDICATIONS:  Home Medications:  baclofen 10 mg oral tablet: 1 tab(s) orally once a day once a day for one week, than 2x a day. (19 Feb 2024 05:20)  Colazal 750 mg oral capsule: 2 cap(s) orally 2 times a day (19 Feb 2024 05:18)  Crestor 5 mg oral tablet: 1 tab(s) orally once a day (19 Feb 2024 05:18)  Humira 40 mg/0.8 mL subcutaneous kit: 40 milligram(s) subcutaneously every 2 weeks (20 Feb 2024 11:52)  meclizine 12.5 mg oral tablet: 1 tab(s) orally 2 times a day as needed for  dizziness (20 Feb 2024 11:52)  pregabalin 75 mg oral capsule: 1 cap(s) orally 2 times a day (19 Feb 2024 05:18)  PriLOSEC 20 mg oral delayed release capsule: 1 cap(s) orally once a day (20 Feb 2024 11:53)  risedronate 150 mg oral tablet: 1 tab(s) orally every 28 days (20 Feb 2024 11:53)  triamterene-hydrochlorothiazide 37.5 mg-25 mg oral capsule: 1 cap(s) orally once a day (19 Feb 2024 05:18)      MEDICATIONS  MEDICATIONS  (STANDING):  aspirin  chewable 81 milliGRAM(s) Oral daily  heparin   Injectable 5000 Unit(s) SubCutaneous every 12 hours  meclizine 12.5 milliGRAM(s) Oral two times a day  pantoprazole    Tablet 40 milliGRAM(s) Oral before breakfast  rosuvastatin 5 milliGRAM(s) Oral at bedtime  sodium chloride 0.9%. 1000 milliLiter(s) (75 mL/Hr) IV Continuous <Continuous>      LABS: All Labs Reviewed:                        12.4   9.11  )-----------( 310      ( 20 Feb 2024 07:13 )             36.5     02-20    132<L>  |  103  |  23  ----------------------------<  85  3.9   |  25  |  1.00    Ca    8.2<L>      20 Feb 2024 07:13  Phos  4.0     02-19  Mg     2.0     02-19    TPro  7.2  /  Alb  3.4  /  TBili  0.8  /  DBili  x   /  AST  19  /  ALT  18  /  AlkPhos  81  02-19        Urinalysis Basic - ( 20 Feb 2024 07:13 )    Color: x / Appearance: x / SG: x / pH: x  Gluc: 85 mg/dL / Ketone: x  / Bili: x / Urobili: x   Blood: x / Protein: x / Nitrite: x   Leuk Esterase: x / RBC: x / WBC x   Sq Epi: x / Non Sq Epi: x / Bacteria: x        Culture - Urine (collected 18 Feb 2024 23:58)  Source: Clean Catch None  Final Report (20 Feb 2024 07:05):    No growth      Blood Culture: 02-18 @ 23:58  Organism --  Gram Stain Blood -- Gram Stain --  Specimen Source Clean Catch None  Culture-Blood --      I&O's Detail    CAPILLARY BLOOD GLUCOSE  CARDIOLOGY TESTING   EKG : reviewed     ECHO   < from: TTE Echo Complete w/o Contrast w/ Doppler (02.19.24 @ 08:54) >   Summary     Estimated left ventricular ejection fraction is 60-65 %.   The left ventricle is normal in size, wall thickness, wall motion and   contractility as seen in limited views.   Normal appearing right ventricle structure and function.   The aortic valve is well visualized, appears mildly calcified. Valve   opening seems to be normal.   Moderate aortic regurgitation is present.   There is calcification of both mitral valve leaflets. The leaflet opening   is normal.   Mild (1+) mitral regurgitation is present.   EA reversal of the mitral inflow consistent with reduced compliance of   the   left ventricle.   Normal appearing tricuspid valve structure.   Moderate (2+) tricuspid valve regurgitation is present.          RADIOLOGY: reviewed

## 2024-02-20 NOTE — PROGRESS NOTE ADULT - ASSESSMENT
84 year old female with a PMHx of memory disorder, undifferentiated somatoform disorder, Crohn's disease, osteoporosis; presents to ED per son at bedside s/p syncope with fall. Patient is awake, and alert however not oriented, keeps asking why she is here in the hospital and she doesn't remember how she got here. Reports being dizzy, otherwise history not reliable. HPI as per ED note: Son states  as he was assisting patient to her bedroom, lost her balance and rolled on the floor; when called out for her she was unconscious and took stimulation to wake up. Also endorsing dizziness, HA, weakness worsening x3 days since starting new medication to treat her memory disorder. Extensive workup for dizziness and memory issues outpatient w/ PCP and neurologist over the past few years. Son provided paperwork w/ recent MRI that showed age-appropriate changes.    Nephrology:  Above from chart and patients son  Patient is awake but non interactive/ falls asleep at times  Admitted after falling while being assisted walking w her son, eyes rolled back transient unresponsiveness  Pt was started on triamterene / HCTZ several days ago along with baclofen  In the ED found to be hyponatremic and started NS IV  Repeat labs improved    A/P  Hyponatremia- most likely due to triamterene/ HCTZ- now on hold  Other meds held pregabalin/  Colazal / baclofen   Repeat BMP at 10 pm  Reduce IV rate accordingly, NS at 75 ml/hr presently  Urine studies done but may not be helpful due to recent diuretic use ( Cait, U osm, Serum Osm)  Nov 2023 sodium level 139  Will monitor for ANICETO due to IV Contrast today done for CT angio chest, con't NS for now for renal protection  Discussed with Dr CHRISTOPHE Irizarry    2/20  hyponatremia- much improved along with improved mental status  Most likely due to triamt/ HCTZ w very short duration, pt may have been very sensitive to diuretics

## 2024-02-20 NOTE — PROGRESS NOTE ADULT - SUBJECTIVE AND OBJECTIVE BOX
I saw and examined the patient at the bedside.   She endorses continued dizziness.    She had an MRI brain this PM that does not show any acute intracranial pathology.      Sodium is improving.  Continuing to receive IV fluids.     On exam:   GEN NAD  CV: RRR, S1, S2  PULM: CTAB  NEURO:   Awake, alert, disoriented to month, year, hospital.  Normal naming, and repetition.  Has impaired insight, not sure why she is in hospital or how she came to be here.    Pupils 3-2mm, symmetric, full VF's, no nystagmus, conjugate, no facial weakness, no dysarthria, tongue midline.   MOTOR: normal bulk and tone, no drift, 5/5 , wrist extension, 4+/5 biceps on the L, 5/5 on the R, 5/5 deltoids and triceps.  5/5 hip flexors, and knee extensors.   SENSORY: intact symmetrically to light touch  COORDINATION: normal FNF    CTH images reviewed: no hemorrhage, no large territorial infarct.   MRI brain images reviewed: no diffusion restriction. Non specific white matter microangiopathic changes.    MRA H&N images reviewed: No significant cervical carotid vertebral or intracranial stenosis.      **EEG SUMMARY/CLASSIFICATION**    Abnormal EEG in an awake, drowsy patient.  - Background slowing, generalized.  __________________________________________________________  **EEG IMPRESSION/CLINICAL CORRELATE**    Abnormal EEG study.  - Mild to moderate nonspecific diffuse or multifocal cerebral dysfunction.   - No epileptiform patterns or seizures recorded.

## 2024-02-20 NOTE — PROGRESS NOTE ADULT - SUBJECTIVE AND OBJECTIVE BOX
NEPHROLOGY CONSULT  HPI:  2/20- pts son present, feels well, no new events  84 year old female with a PMHx of memory disorder, undifferentiated somatoform disorder, Crohn's disease, osteoporosis; presents to ED per son at bedside s/p syncope with fall. Patient is awake, and alert however not oriented, keeps asking why she is here in the hospital and she doesn't remember how she got here. Reports being dizzy, otherwise history not reliable. HPI as per ED note: Son states  as he was assisting patient to her bedroom, lost her balance and rolled on the floor; when called out for her she was unconscious and took stimulation to wake up. Also endorsing dizziness, HA, weakness worsening x3 days since starting new medication to treat her memory disorder. Extensive workup for dizziness and memory issues outpatient w/ PCP and neurologist over the past few years. Son provided paperwork w/ recent MRI that showed age-appropriate changes.    Nephrology:  Above from chart and patients son  Patient is awake but non interactive/ falls asleep at times  Admitted after falling while being assisted walking w her son, eyes rolled back transient unresponsiveness  Pt was started on triamterene / HCTZ several days ago along with baclofen  In the ED found to be hyponatremic and started NS IV  Repeat labs improved      PAST MEDICAL & SURGICAL HISTORY:  Undifferentiated somatoform disorder      Memory disorder      Major depressive disorder      IBD (inflammatory bowel disease)      Esophageal reflux      S/P tonsillectomy          FAMILY HISTORY:    MEDICATIONS  (STANDING):  aspirin  chewable 81 milliGRAM(s) Oral daily  heparin   Injectable 5000 Unit(s) SubCutaneous every 12 hours  meclizine 12.5 milliGRAM(s) Oral two times a day  pantoprazole    Tablet 40 milliGRAM(s) Oral before breakfast  rosuvastatin 5 milliGRAM(s) Oral at bedtime  sodium chloride 0.9%. 1000 milliLiter(s) (75 mL/Hr) IV Continuous <Continuous>    MEDICATIONS  (PRN):  acetaminophen     Tablet .. 650 milliGRAM(s) Oral once PRN Mild Pain (1 - 3)  melatonin 5 milliGRAM(s) Oral at bedtime PRN Insomnia  ondansetron Injectable 4 milliGRAM(s) IV Push every 6 hours PRN Nausea      Allergies    No Known Allergies    Intolerances        I&O's Summary        REVIEW OF SYSTEMS:    UTO        MEDICATIONS  (STANDING):  aspirin  chewable 81 milliGRAM(s) Oral daily  heparin   Injectable 5000 Unit(s) SubCutaneous every 12 hours  meclizine 12.5 milliGRAM(s) Oral two times a day  pantoprazole    Tablet 40 milliGRAM(s) Oral before breakfast  rosuvastatin 5 milliGRAM(s) Oral at bedtime  sodium chloride 0.9%. 1000 milliLiter(s) (40 mL/Hr) IV Continuous <Continuous>    MEDICATIONS  (PRN):  acetaminophen     Tablet .. 650 milliGRAM(s) Oral once PRN Mild Pain (1 - 3)  melatonin 5 milliGRAM(s) Oral at bedtime PRN Insomnia  ondansetron Injectable 4 milliGRAM(s) IV Push every 6 hours PRN Nausea        Vital Signs Last 24 Hrs  T(C): 36.7 (20 Feb 2024 16:32), Max: 36.8 (20 Feb 2024 12:00)  T(F): 98 (20 Feb 2024 16:32), Max: 98.2 (20 Feb 2024 12:00)  HR: 89 (20 Feb 2024 16:32) (67 - 89)  BP: 148/90 (20 Feb 2024 16:32) (112/63 - 148/90)  BP(mean): --  RR: 18 (20 Feb 2024 16:32) (17 - 18)  SpO2: 98% (20 Feb 2024 16:32) (92% - 98%)    Parameters below as of 20 Feb 2024 16:32  Patient On (Oxygen Delivery Method): room air        General:  communicating and answering questions today    Neuro:  awake     HEENT:  No JVD, no masses, Eyes anicteric, ,    Cardiovascular:  Regular rate and rhythm, with normal S1 and S2.    Lungs:  clear. no rales, no wheezing, .    Abdomen:  Normoactive bowel sounds. Soft, flat, non-tender, and non-distended.  positive bowel sounds    Skin:  Warm, dry    Extremities:  warm,  no cyanosis    LABS:                                 12.4   9.11  )-----------( 310      ( 20 Feb 2024 07:13 )             36.5                         13.3   9.66  )-----------( 342      ( 19 Feb 2024 06:59 )             39.1                         14.8   9.05  )-----------( 423      ( 18 Feb 2024 21:14 )             44.1         132    |  103    |  23     ----------------------------<  85        20 Feb 2024 07:13  3.9     |  25     |  1.00     129    |  96     |  21     ----------------------------<  97        19 Feb 2024 22:20  4.2     |  29     |  1.24     127    |  x      |  x      ----------------------------<  x         19 Feb 2024 16:41  x       |  x      |  x        Ca    8.2        20 Feb 2024 07:13  Ca    8.6        19 Feb 2024 22:20  Ca    9.4        19 Feb 2024 06:59    Phos  4.0       19 Feb 2024 06:59    Mg     2.0       19 Feb 2024 06:59  Mg     2.0       18 Feb 2024 21:14      TPro  7.2  /  Alb  3.4  /  TBili  0.8  /  DBili  x   /  AST  19  /  ALT  18  /  AlkPhos  81  02-19      Urinalysis Basic - ( 19 Feb 2024 06:59 )    Color: x / Appearance: x / SG: x / pH: x  Gluc: 99 mg/dL / Ketone: x  / Bili: x / Urobili: x   Blood: x / Protein: x / Nitrite: x   Leuk Esterase: x / RBC: x / WBC x   Sq Epi: x / Non Sq Epi: x / Bacteria: x      Magnesium: 2.0 mg/dL (02-19 @ 06:59)  Phosphorus: 4.0 mg/dL (02-19 @ 06:59)

## 2024-02-20 NOTE — PROGRESS NOTE ADULT - ASSESSMENT
84 year old woman with dementia, Crohn's disease, presenting after an episode of loss of consciousness at home, with persistent complaint of dizziness.  On exam she initially had vertical nystagmus, but this has resolved today.  No other neuro findings on exam.  Imaging unremarkable.  Hyponatremia is improving.    No evidence for a CNS cause of her dizziness.  Suspect most likely due to hypovolemia and hyponatremia.    Continue fluid repletion as tolerated.    No indication for continuation of the antiplatelets at this point.  Will DC the ASA  May need PT/OT for vestibular/gait training if not improving with IV fluids.   Can use meclizine judiciously, but monitor for any signs of anticholinergic effects.    No further inpatient neurology recommendations anticipated at this point.    Please page or call with any acute changes, or other issues we can help address.

## 2024-02-21 ENCOUNTER — TRANSCRIPTION ENCOUNTER (OUTPATIENT)
Age: 84
End: 2024-02-21

## 2024-02-21 VITALS
OXYGEN SATURATION: 98 % | HEART RATE: 70 BPM | SYSTOLIC BLOOD PRESSURE: 166 MMHG | DIASTOLIC BLOOD PRESSURE: 71 MMHG | TEMPERATURE: 98 F | RESPIRATION RATE: 18 BRPM

## 2024-02-21 LAB
ANA PAT FLD IF-IMP: ABNORMAL
ANA TITR SER: ABNORMAL
ANION GAP SERPL CALC-SCNC: 4 MMOL/L — LOW (ref 5–17)
BUN SERPL-MCNC: 16 MG/DL — SIGNIFICANT CHANGE UP (ref 7–23)
CALCIUM SERPL-MCNC: 8.8 MG/DL — SIGNIFICANT CHANGE UP (ref 8.5–10.1)
CHLORIDE SERPL-SCNC: 112 MMOL/L — HIGH (ref 96–108)
CO2 SERPL-SCNC: 22 MMOL/L — SIGNIFICANT CHANGE UP (ref 22–31)
CREAT SERPL-MCNC: 1.01 MG/DL — SIGNIFICANT CHANGE UP (ref 0.5–1.3)
EGFR: 55 ML/MIN/1.73M2 — LOW
GLUCOSE SERPL-MCNC: 92 MG/DL — SIGNIFICANT CHANGE UP (ref 70–99)
HCT VFR BLD CALC: 41.8 % — SIGNIFICANT CHANGE UP (ref 34.5–45)
HGB BLD-MCNC: 13.6 G/DL — SIGNIFICANT CHANGE UP (ref 11.5–15.5)
MCHC RBC-ENTMCNC: 29.2 PG — SIGNIFICANT CHANGE UP (ref 27–34)
MCHC RBC-ENTMCNC: 32.5 GM/DL — SIGNIFICANT CHANGE UP (ref 32–36)
MCV RBC AUTO: 89.9 FL — SIGNIFICANT CHANGE UP (ref 80–100)
PLATELET # BLD AUTO: 387 K/UL — SIGNIFICANT CHANGE UP (ref 150–400)
POTASSIUM SERPL-MCNC: 3.7 MMOL/L — SIGNIFICANT CHANGE UP (ref 3.5–5.3)
POTASSIUM SERPL-SCNC: 3.7 MMOL/L — SIGNIFICANT CHANGE UP (ref 3.5–5.3)
RBC # BLD: 4.65 M/UL — SIGNIFICANT CHANGE UP (ref 3.8–5.2)
RBC # FLD: 12.5 % — SIGNIFICANT CHANGE UP (ref 10.3–14.5)
SODIUM SERPL-SCNC: 138 MMOL/L — SIGNIFICANT CHANGE UP (ref 135–145)
WBC # BLD: 10.01 K/UL — SIGNIFICANT CHANGE UP (ref 3.8–10.5)
WBC # FLD AUTO: 10.01 K/UL — SIGNIFICANT CHANGE UP (ref 3.8–10.5)

## 2024-02-21 PROCEDURE — 99232 SBSQ HOSP IP/OBS MODERATE 35: CPT

## 2024-02-21 PROCEDURE — 99239 HOSP IP/OBS DSCHRG MGMT >30: CPT

## 2024-02-21 RX ORDER — AMLODIPINE BESYLATE 2.5 MG/1
5 TABLET ORAL DAILY
Refills: 0 | Status: DISCONTINUED | OUTPATIENT
Start: 2024-02-21 | End: 2024-02-21

## 2024-02-21 RX ORDER — AMLODIPINE BESYLATE 2.5 MG/1
1 TABLET ORAL
Qty: 30 | Refills: 0
Start: 2024-02-21 | End: 2024-03-21

## 2024-02-21 RX ORDER — TRIAMTERENE/HYDROCHLOROTHIAZID 75 MG-50MG
1 TABLET ORAL
Refills: 0 | DISCHARGE

## 2024-02-21 RX ORDER — BACLOFEN 100 %
1 POWDER (GRAM) MISCELLANEOUS
Refills: 0 | DISCHARGE

## 2024-02-21 RX ADMIN — Medication 12.5 MILLIGRAM(S): at 10:14

## 2024-02-21 RX ADMIN — HEPARIN SODIUM 5000 UNIT(S): 5000 INJECTION INTRAVENOUS; SUBCUTANEOUS at 10:15

## 2024-02-21 RX ADMIN — PANTOPRAZOLE SODIUM 40 MILLIGRAM(S): 20 TABLET, DELAYED RELEASE ORAL at 05:43

## 2024-02-21 RX ADMIN — Medication 81 MILLIGRAM(S): at 10:14

## 2024-02-21 RX ADMIN — AMLODIPINE BESYLATE 5 MILLIGRAM(S): 2.5 TABLET ORAL at 11:19

## 2024-02-21 NOTE — PROGRESS NOTE ADULT - SUBJECTIVE AND OBJECTIVE BOX
NEPHROLOGY INTERVAL HPI/OVERNIGHT EVENTS:    Date of Service: 02-21-24 @ 11:48    2/21--No distress.  Pt's sopn at bedside. Continues with dizziness and generalized malaise.  No SOB.  very poor po appetite.  2/20- pts son present, feels well, no new events    HPI:  84 year old female with a PMHx of memory disorder, undifferentiated somatoform disorder, Crohn's disease, osteoporosis; presents to ED per son at bedside s/p syncope with fall. Patient is awake, and alert however not oriented, keeps asking why she is here in the hospital and she doesn't remember how she got here. Reports being dizzy, otherwise history not reliable. HPI as per ED note: Son states  as he was assisting patient to her bedroom, lost her balance and rolled on the floor; when called out for her she was unconscious and took stimulation to wake up. Also endorsing dizziness, HA, weakness worsening x3 days since starting new medication to treat her memory disorder. Extensive workup for dizziness and memory issues outpatient w/ PCP and neurologist over the past few years. Son provided paperwork w/ recent MRI that showed age-appropriate changes.    Nephrology:  Above from chart and patients son  Patient is awake but non interactive/ falls asleep at times  Admitted after falling while being assisted walking w her son, eyes rolled back transient unresponsiveness  Pt was started on triamterene / HCTZ several days ago along with baclofen  In the ED found to be hyponatremic and started NS IV  Repeat labs improved        MEDICATIONS  (STANDING):  amLODIPine   Tablet 5 milliGRAM(s) Oral daily  aspirin  chewable 81 milliGRAM(s) Oral daily  heparin   Injectable 5000 Unit(s) SubCutaneous every 12 hours  meclizine 12.5 milliGRAM(s) Oral two times a day  pantoprazole    Tablet 40 milliGRAM(s) Oral before breakfast  rosuvastatin 5 milliGRAM(s) Oral at bedtime  sodium chloride 0.9%. 1000 milliLiter(s) (40 mL/Hr) IV Continuous <Continuous>    MEDICATIONS  (PRN):  acetaminophen     Tablet .. 650 milliGRAM(s) Oral once PRN Mild Pain (1 - 3)  melatonin 5 milliGRAM(s) Oral at bedtime PRN Insomnia  ondansetron Injectable 4 milliGRAM(s) IV Push every 6 hours PRN Nausea    Vital Signs Last 24 Hrs  T(C): 36.7 (21 Feb 2024 08:45), Max: 36.9 (20 Feb 2024 21:08)  T(F): 98 (21 Feb 2024 08:45), Max: 98.5 (20 Feb 2024 21:08)  HR: 70 (21 Feb 2024 08:45) (70 - 89)  BP: 166/71 (21 Feb 2024 08:45) (120/93 - 166/71)  BP(mean): 95 (21 Feb 2024 08:45) (95 - 95)  RR: 18 (21 Feb 2024 08:45) (18 - 19)  SpO2: 98% (21 Feb 2024 08:45) (97% - 98%)    Parameters below as of 21 Feb 2024 08:45  Patient On (Oxygen Delivery Method): room air    PHYSICAL EXAM:  GENERAL: no distress  CHEST/LUNG: clear to aus  HEART: S1S2 RRR  ABDOMEN: soft  EXTREMITIES: no edema  SKIN:     LABS:                        13.6   10.01 )-----------( 387      ( 21 Feb 2024 08:07 )             41.8     02-21    138  |  112<H>  |  16  ----------------------------<  92  3.7   |  22  |  1.01    Ca    8.8      21 Feb 2024 08:07        Urinalysis Basic - ( 21 Feb 2024 08:07 )    Color: x / Appearance: x / SG: x / pH: x  Gluc: 92 mg/dL / Ketone: x  / Bili: x / Urobili: x   Blood: x / Protein: x / Nitrite: x   Leuk Esterase: x / RBC: x / WBC x   Sq Epi: x / Non Sq Epi: x / Bacteria: x              RADIOLOGY & ADDITIONAL TESTS:

## 2024-02-21 NOTE — DISCHARGE NOTE PROVIDER - NSDCMRMEDTOKEN_GEN_ALL_CORE_FT
amLODIPine 5 mg oral tablet: 1 tab(s) orally once a day  Colazal 750 mg oral capsule: 2 cap(s) orally 2 times a day  Crestor 5 mg oral tablet: 1 tab(s) orally once a day  Humira 40 mg/0.8 mL subcutaneous kit: 40 milligram(s) subcutaneously every 2 weeks  meclizine 12.5 mg oral tablet: 1 tab(s) orally 2 times a day as needed for  dizziness  PriLOSEC 20 mg oral delayed release capsule: 1 cap(s) orally once a day  risedronate 150 mg oral tablet: 1 tab(s) orally every 28 days

## 2024-02-21 NOTE — DISCHARGE NOTE NURSING/CASE MANAGEMENT/SOCIAL WORK - PATIENT PORTAL LINK FT
You can access the FollowMyHealth Patient Portal offered by [facility] by registering at the following website: [email or web address]. By joining [employer]’s FollowMyHealth portal, you will also be able to view your health information using other applications (apps) compatible with our system.

## 2024-02-21 NOTE — PROGRESS NOTE ADULT - ASSESSMENT
84 year old female with a PMHx of memory disorder, undifferentiated somatoform disorder, Crohn's disease, osteoporosis; presents to ED per son at bedside s/p syncope with fall. Patient is awake, and alert however not oriented, keeps asking why she is here in the hospital and she doesn't remember how she got here. Reports being dizzy, otherwise history not reliable. HPI as per ED note: Son states  as he was assisting patient to her bedroom, lost her balance and rolled on the floor; when called out for her she was unconscious and took stimulation to wake up. Also endorsing dizziness, HA, weakness worsening x3 days since starting new medication to treat her memory disorder. Extensive workup for dizziness and memory issues outpatient w/ PCP and neurologist over the past few years. Son provided paperwork w/ recent MRI that showed age-appropriate changes.    Nephrology:  Above from chart and patients son  Patient is awake but non interactive/ falls asleep at times  Admitted after falling while being assisted walking w her son, eyes rolled back transient unresponsiveness  Pt was started on triamterene / HCTZ several days ago along with baclofen  In the ED found to be hyponatremic and started NS IV  Repeat labs improved    A/P  Hyponatremia- most likely due to triamterene/ HCTZ- now on hold  Other meds held pregabalin/  Colazal / baclofen   Repeat BMP at 10 pm  Reduce IV rate accordingly, NS at 75 ml/hr presently  Urine studies done but may not be helpful due to recent diuretic use ( Cait, U osm, Serum Osm)  Nov 2023 sodium level 139  Will monitor for ANICETO due to IV Contrast today done for CT angio chest, con't NS for now for renal protection  Discussed with Dr CHRISTOPHE Irizarry    2/20  hyponatremia- much improved along with improved mental status  Most likely due to triamt/ HCTZ w very short duration, pt may have been very sensitive to diuretics    2/21 SY  --Hyponatremia : resolved.  D/c IVF.  --Would not resume diuretics in this pt, ricco with very poor food intake.  --Fluid status stable.  --Neuro : post extensive work up for dizziness--unclear etiology.

## 2024-02-21 NOTE — DISCHARGE NOTE PROVIDER - HOSPITAL COURSE
HPI: 84 year old female with a PMHx of memory disorder, undifferentiated somatoform disorder, Crohn's disease, osteoporosis; presents to ED per son at bedside s/p syncope with fall. Patient is awake, and alert however not oriented, keeps asking why she is here in the hospital and she doesn't remember how she got here. Reports being dizzy, otherwise history not reliable. HPI as per ED note: Son states  as he was assisting patient to her bedroom, lost her balance and rolled on the floor; when called out for her she was unconscious and took stimulation to wake up. Also endorsing dizziness, HA, weakness worsening x3 days since starting new medication to treat her memory disorder. Extensive workup for dizziness and memory issues outpatient w/ PCP and neurologist over the past few years. Son provided paperwork w/ recent MRI that showed age-appropriate changes.    #Near Syncope/Dizziness   -EKG: reviewed   -CTH and CT cervical spine noted above.   -orthostatic-> improved with fluids   -US duplex: negative   -MRI: no acute pathology  -Will hold pregabalin and baclofen for now.   -Meclizine PRN   -Initially thought sx would improve with correction of sodium, however dizziness still present   -Per son at bedside, patient has had constant complaints of dizziness for 2 years, worsening last 4 months. Patient has seen multiple specialists outside of hospital and workup has been negative. Dizziness is present all the time, does not change with position changes. Concern for depression - patient lost her  recently and has had low appetite, motivation. Offered inpatient psych, however patient will follow up with a Psychiatrist the family knows    #Hyponatremia with hypochloremia   -IV fluids   -Likely 2/2 Triamterene and HCT, recently started  -Na corrected to 138 on day of discharge, will continue to hold HCTZ  -nephrology reccs noted     #HTN  -currently on Triamterene/HCTZ  -hold for now due to above   -Replace Triamterene/HCTZ for Amlodipine 5 mg. Son will monitor BP at home and with close follow up    #HLD  -c/w statin     #History of Crohn's  -Due for Humira  -on Colazal     #Osteoporosis   -CT cervical spine noted for varying degrees of foraminal and canal stenosis, severe at multiple levels.  -Chronic  -on Residronate     #Cognitive Decline   -Hx of known dementia   -outpatient f/u with Neurology     #GERD  -PPI     Patient was discharged to: Home    New medications: Amlodipine 5 mg daily    Items to follow up as outpatient: Psychiatry follow up, PCP follow up, Neurology follow up    Physical exam at the time of discharge:  LOS: 3d    VITALS:   T(C): 36.7 (02-21-24 @ 08:45), Max: 36.9 (02-20-24 @ 21:08)  HR: 70 (02-21-24 @ 08:45) (70 - 89)  BP: 166/71 (02-21-24 @ 08:45) (148/56 - 166/71)  RR: 18 (02-21-24 @ 08:45) (18 - 19)  SpO2: 98% (02-21-24 @ 08:45) (97% - 98%)    PHYSICAL EXAM:  GENERAL: NAD  HEAD:  Atraumatic, Normocephalic  EYES: EOMI, PERRLA, conjunctiva and sclera clear  ENMT: No tonsillar erythema, exudates, or enlargement; Moist mucous membranes  NECK: Supple, No JVD, Normal thyroid  HEART: Regular rate and rhythm; No murmurs, rubs, or gallops  RESPIRATORY: Unlabored respirations. CTA B/L, No W/R/R  ABDOMEN: Soft, Nontender, Nondistended; Bowel sounds present  NEUROLOGY: A&Ox3, nonfocal, moving all extremities  EXTREMITIES:  2+ Peripheral Pulses, No clubbing, cyanosis, or edema  SKIN: warm, dry, normal color, no rash or abnormal lesions HPI: 84 year old female with a PMHx of memory disorder, undifferentiated somatoform disorder, Crohn's disease, osteoporosis; presents to ED per son at bedside s/p syncope with fall. Patient is awake, and alert however not oriented, keeps asking why she is here in the hospital and she doesn't remember how she got here. Reports being dizzy, otherwise history not reliable. HPI as per ED note: Son states  as he was assisting patient to her bedroom, lost her balance and rolled on the floor; when called out for her she was unconscious and took stimulation to wake up. Also endorsing dizziness, HA, weakness worsening x3 days since starting new medication to treat her memory disorder. Extensive workup for dizziness and memory issues outpatient w/ PCP and neurologist over the past few years. Son provided paperwork w/ recent MRI that showed age-appropriate changes.    #Near Syncope/Dizziness   -EKG: reviewed   -CTH and CT cervical spine noted above.   -orthostatic-> improved with fluids   -US duplex: negative   -MRI: no acute pathology  -Will hold pregabalin and baclofen for now.   -Meclizine PRN   -Initially thought sx would improve with correction of sodium, however dizziness still present   -Per son at bedside, patient has had constant complaints of dizziness for 2 years, worsening last 4 months. Patient has seen multiple specialists outside of hospital and workup has been negative. Has tried outpatient vestibular rehab which did not help. Dizziness is present all the time, does not change with position changes. Concern for depression - patient lost her  recently and has had low appetite, motivation. Offered inpatient psych, however patient will follow up with a Psychiatrist the family knows    #Hyponatremia with hypochloremia   -IV fluids   -Likely 2/2 Triamterene and HCT, recently started  -Na corrected to 138 on day of discharge, will continue to hold HCTZ  -nephrology reccs noted     #HTN  -currently on Triamterene/HCTZ  -hold for now due to above   -Replace Triamterene/HCTZ for Amlodipine 5 mg. Son will monitor BP at home and with close follow up    #HLD  -c/w statin     #History of Crohn's  -Due for Humira  -on Colazal     #Osteoporosis   -CT cervical spine noted for varying degrees of foraminal and canal stenosis, severe at multiple levels.  -Chronic  -on Residronate     #Cognitive Decline   -Hx of known dementia   -outpatient f/u with Neurology     #GERD  -PPI     Patient was discharged to: Home    New medications: Amlodipine 5 mg daily    Items to follow up as outpatient: Psychiatry follow up, PCP follow up, Neurology follow up    Physical exam at the time of discharge:  LOS: 3d    VITALS:   T(C): 36.7 (02-21-24 @ 08:45), Max: 36.9 (02-20-24 @ 21:08)  HR: 70 (02-21-24 @ 08:45) (70 - 89)  BP: 166/71 (02-21-24 @ 08:45) (148/56 - 166/71)  RR: 18 (02-21-24 @ 08:45) (18 - 19)  SpO2: 98% (02-21-24 @ 08:45) (97% - 98%)    PHYSICAL EXAM:  GENERAL: NAD  HEAD:  Atraumatic, Normocephalic  EYES: EOMI, PERRLA, conjunctiva and sclera clear  ENMT: No tonsillar erythema, exudates, or enlargement; Moist mucous membranes  NECK: Supple, No JVD, Normal thyroid  HEART: Regular rate and rhythm; No murmurs, rubs, or gallops  RESPIRATORY: Unlabored respirations. CTA B/L, No W/R/R  ABDOMEN: Soft, Nontender, Nondistended; Bowel sounds present  NEUROLOGY: A&Ox3, nonfocal, moving all extremities  EXTREMITIES:  2+ Peripheral Pulses, No clubbing, cyanosis, or edema  SKIN: warm, dry, normal color, no rash or abnormal lesions

## 2024-02-21 NOTE — DISCHARGE NOTE PROVIDER - CARE PROVIDER_API CALL
Venancio Wells [facility]  Internal Medicine  [address] [place] [address], [address]  [place] [address]  Phone: [phone]  Fax: [phone]  Established Patient  Follow Up Time: 1 week

## 2024-02-21 NOTE — DISCHARGE NOTE PROVIDER - NSDCCAREPROVSEEN_GEN_ALL_CORE_FT
Felicitas, Francisca Alcocer, Santiago Kennedy, Ming Irizarry, Milton Dickson, Antoine Sanchez, Soraya Alba, Prem Pitt, Suk-Hyeon

## 2024-02-21 NOTE — DISCHARGE NOTE PROVIDER - NSDCCPCAREPLAN_GEN_ALL_CORE_FT
PRINCIPAL DISCHARGE DIAGNOSIS  Diagnosis: Hyponatremia  Assessment and Plan of Treatment: We believe your low sodium levels could of been caused by your home medication: Triamterene/Hydrochlorothiazide. Please STOP this medication upon discharge. We started you on another blood pressure medication to replace it called, Amlodipine. You can take Amlodipine 5 mg once daily and follow up with your Primary Care Doctor in 1 week. You can also monitor your blood pressure at home. Stop Amlodipine if your systolic blood pressure (top number) is less than 100 or if you develop worsening dizziness, and call your Primary Care Doctor for follow up.      SECONDARY DISCHARGE DIAGNOSES  Diagnosis: Dizziness  Assessment and Plan of Treatment: You presented with chronic dizziness, which has been present for a few months now. The new MRI we obtained did not show any new findings. Please continue to follow up with your outpatient Neurologist for further management. You can continue to take Meclizine as prescribed.     PRINCIPAL DISCHARGE DIAGNOSIS  Diagnosis: Hyponatremia  Assessment and Plan of Treatment: We believe your low sodium levels could of been caused by your home medication: Triamterene/Hydrochlorothiazide. Please STOP this medication upon discharge. We started you on another blood pressure medication to replace it called, Amlodipine. You can take Amlodipine 5 mg once daily and follow up with your Primary Care Doctor in 1 week. You can also monitor your blood pressure at home. Stop Amlodipine if your systolic blood pressure (top number) is less than 100 or if you develop worsening dizziness, and call your Primary Care Doctor for urgent follow up.      SECONDARY DISCHARGE DIAGNOSES  Diagnosis: Dizziness  Assessment and Plan of Treatment: You presented with chronic dizziness, which has been present for a few months now. The new MRI we obtained did not show any new findings. Please continue to follow up with your outpatient Neurologist for further management. You can continue to take Meclizine as prescribed.    Diagnosis: DAVID positive  Assessment and Plan of Treatment: Anti-nuclear antibodies (DAVID) are possible signs of autoimmune diseases, such as lupus, scleroderma, Sjögren’s syndrome, juvenile arthritis, or polymyositis and dermatomyositis. White blood cells in the body’s immune system make antibodies to spot and attack foreign agents that cause infections or disease. Sometimes, antibodies target normal proteins in our body by mistake. This can trigger inflammation that leads to joint or tissue damage. These antibodies are called autoantibodies. Everyone has small amounts of autoantibodies. ANAs are one type of autoantibody. A positive DAVID blood test means autoantibodies are present, but it’s not a sure sign of an autoimmune disease.  Please continue to follow up with your Rheumatologist for further workup.

## 2024-02-21 NOTE — DISCHARGE NOTE NURSING/CASE MANAGEMENT/SOCIAL WORK - NSDCPEFALRISK_GEN_ALL_CORE
For information on Fall & Injury Prevention, visit: [email or web address] OR  [email or web address] OR  https://www.cdc.gov/steadi/patient.html

## 2024-02-21 NOTE — CHART NOTE - NSCHARTNOTEFT_GEN_A_CORE
Rolling Walker:  Patient requires rolling walker due to generalized weakness and chronic dizziness, for safe ambulation at discharge.

## 2024-02-27 DIAGNOSIS — H55.09 OTHER FORMS OF NYSTAGMUS: ICD-10-CM

## 2024-02-27 DIAGNOSIS — M81.0 AGE-RELATED OSTEOPOROSIS WITHOUT CURRENT PATHOLOGICAL FRACTURE: ICD-10-CM
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
## 2024-02-27 DIAGNOSIS — R42 DIZZINESS AND GIDDINESS: ICD-10-CM

## 2024-02-27 DIAGNOSIS — I95.1 ORTHOSTATIC HYPOTENSION: ICD-10-CM

## 2024-02-27 DIAGNOSIS — F03.90 UNSPECIFIED DEMENTIA WITHOUT BEHAVIORAL DISTURBANCE: ICD-10-CM

## 2024-02-27 DIAGNOSIS — K50.90 CROHN'S DISEASE, UNSPECIFIED, WITHOUT COMPLICATIONS: ICD-10-CM

## 2024-02-27 DIAGNOSIS — F45.1 UNDIFFERENTIATED SOMATOFORM DISORDER: ICD-10-CM

## 2024-02-27 DIAGNOSIS — E78.5 HYPERLIPIDEMIA, UNSPECIFIED: ICD-10-CM

## 2024-02-27 DIAGNOSIS — T50.2X5A ADVERSE EFFECT OF CARBONIC-ANHYDRASE INHIBITORS, BENZOTHIADIAZIDES AND OTHER DIURETICS, INITIAL ENCOUNTER: ICD-10-CM

## 2024-02-27 DIAGNOSIS — E87.1 HYPO-OSMOLALITY AND HYPONATREMIA: ICD-10-CM

## 2024-02-27 DIAGNOSIS — E87.8 OTHER DISORDERS OF ELECTROLYTE AND FLUID BALANCE, NOT ELSEWHERE CLASSIFIED: ICD-10-CM

## 2024-02-27 DIAGNOSIS — K21.9 GASTRO-ESOPHAGEAL REFLUX DISEASE WITHOUT ESOPHAGITIS: ICD-10-CM

## 2024-02-27 DIAGNOSIS — R76.0 RAISED ANTIBODY TITER: ICD-10-CM

## 2024-02-27 DIAGNOSIS — I10 ESSENTIAL (PRIMARY) HYPERTENSION: ICD-10-CM

## 2024-02-27 DIAGNOSIS — F32.9 MAJOR DEPRESSIVE DISORDER, SINGLE EPISODE, UNSPECIFIED: ICD-10-CM

## 2024-02-27 DIAGNOSIS — M48.00 SPINAL STENOSIS, SITE UNSPECIFIED: ICD-10-CM

## 2024-02-27 DIAGNOSIS — R55 SYNCOPE AND COLLAPSE: ICD-10-CM

## 2024-08-27 ENCOUNTER — INPATIENT (INPATIENT)
Facility: HOSPITAL | Age: 84
LOS: 7 days | Discharge: HOME | DRG: 641 | End: 2024-09-04
Attending: STUDENT IN AN ORGANIZED HEALTH CARE EDUCATION/TRAINING PROGRAM | Admitting: SURGERY
Payer: MEDICARE

## 2024-08-27 VITALS
SYSTOLIC BLOOD PRESSURE: 111 MMHG | TEMPERATURE: 98 F | DIASTOLIC BLOOD PRESSURE: 67 MMHG | HEART RATE: 81 BPM | OXYGEN SATURATION: 95 % | WEIGHT: 125.44 LBS | RESPIRATION RATE: 16 BRPM

## 2024-08-27 DIAGNOSIS — Z90.89 ACQUIRED ABSENCE OF OTHER ORGANS: Chronic | ICD-10-CM

## 2024-08-27 DIAGNOSIS — E87.1 HYPO-OSMOLALITY AND HYPONATREMIA: ICD-10-CM

## 2024-08-27 LAB
ALBUMIN SERPL ELPH-MCNC: 4.1 G/DL — SIGNIFICANT CHANGE UP (ref 3.3–5)
ALP SERPL-CCNC: 125 U/L — HIGH (ref 40–120)
ALT FLD-CCNC: 17 U/L — SIGNIFICANT CHANGE UP (ref 12–78)
ANION GAP SERPL CALC-SCNC: 8 MMOL/L — SIGNIFICANT CHANGE UP (ref 5–17)
APPEARANCE UR: ABNORMAL
AST SERPL-CCNC: 16 U/L — SIGNIFICANT CHANGE UP (ref 15–37)
BACTERIA # UR AUTO: ABNORMAL /HPF
BASOPHILS # BLD AUTO: 0.07 K/UL — SIGNIFICANT CHANGE UP (ref 0–0.2)
BASOPHILS NFR BLD AUTO: 0.4 % — SIGNIFICANT CHANGE UP (ref 0–2)
BILIRUB SERPL-MCNC: 0.6 MG/DL — SIGNIFICANT CHANGE UP (ref 0.2–1.2)
BILIRUB UR-MCNC: NEGATIVE — SIGNIFICANT CHANGE UP
BUN SERPL-MCNC: 13 MG/DL — SIGNIFICANT CHANGE UP (ref 7–23)
CALCIUM SERPL-MCNC: 9.5 MG/DL — SIGNIFICANT CHANGE UP (ref 8.5–10.1)
CAST: 4 /LPF — SIGNIFICANT CHANGE UP (ref 0–4)
CHLORIDE SERPL-SCNC: 81 MMOL/L — LOW (ref 96–108)
CO2 SERPL-SCNC: 26 MMOL/L — SIGNIFICANT CHANGE UP (ref 22–31)
COLOR SPEC: YELLOW — SIGNIFICANT CHANGE UP
CREAT SERPL-MCNC: 1.02 MG/DL — SIGNIFICANT CHANGE UP (ref 0.5–1.3)
DIFF PNL FLD: ABNORMAL
EGFR: 55 ML/MIN/1.73M2 — LOW
EOSINOPHIL # BLD AUTO: 0.04 K/UL — SIGNIFICANT CHANGE UP (ref 0–0.5)
EOSINOPHIL NFR BLD AUTO: 0.2 % — SIGNIFICANT CHANGE UP (ref 0–6)
GLUCOSE SERPL-MCNC: 125 MG/DL — HIGH (ref 70–99)
GLUCOSE UR QL: NEGATIVE MG/DL — SIGNIFICANT CHANGE UP
HCT VFR BLD CALC: 45.1 % — HIGH (ref 34.5–45)
HGB BLD-MCNC: 16.3 G/DL — HIGH (ref 11.5–15.5)
IMM GRANULOCYTES NFR BLD AUTO: 1.1 % — HIGH (ref 0–0.9)
KETONES UR-MCNC: ABNORMAL MG/DL
LACTATE SERPL-SCNC: 1.8 MMOL/L — SIGNIFICANT CHANGE UP (ref 0.7–2)
LEUKOCYTE ESTERASE UR-ACNC: ABNORMAL
LYMPHOCYTES # BLD AUTO: 1.37 K/UL — SIGNIFICANT CHANGE UP (ref 1–3.3)
LYMPHOCYTES # BLD AUTO: 7.1 % — LOW (ref 13–44)
MAGNESIUM SERPL-MCNC: 2.6 MG/DL — SIGNIFICANT CHANGE UP (ref 1.6–2.6)
MCHC RBC-ENTMCNC: 29.6 PG — SIGNIFICANT CHANGE UP (ref 27–34)
MCHC RBC-ENTMCNC: 36.1 GM/DL — HIGH (ref 32–36)
MCV RBC AUTO: 81.9 FL — SIGNIFICANT CHANGE UP (ref 80–100)
MONOCYTES # BLD AUTO: 0.67 K/UL — SIGNIFICANT CHANGE UP (ref 0–0.9)
MONOCYTES NFR BLD AUTO: 3.5 % — SIGNIFICANT CHANGE UP (ref 2–14)
NEUTROPHILS # BLD AUTO: 16.8 K/UL — HIGH (ref 1.8–7.4)
NEUTROPHILS NFR BLD AUTO: 87.7 % — HIGH (ref 43–77)
NITRITE UR-MCNC: NEGATIVE — SIGNIFICANT CHANGE UP
PH UR: >=9 (ref 5–8)
PLATELET # BLD AUTO: 513 K/UL — HIGH (ref 150–400)
POTASSIUM SERPL-MCNC: 3.7 MMOL/L — SIGNIFICANT CHANGE UP (ref 3.5–5.3)
POTASSIUM SERPL-SCNC: 3.7 MMOL/L — SIGNIFICANT CHANGE UP (ref 3.5–5.3)
PROT SERPL-MCNC: 8.7 GM/DL — HIGH (ref 6–8.3)
PROT UR-MCNC: 30 MG/DL
RBC # BLD: 5.51 M/UL — HIGH (ref 3.8–5.2)
RBC # FLD: 12.1 % — SIGNIFICANT CHANGE UP (ref 10.3–14.5)
RBC CASTS # UR COMP ASSIST: 1 /HPF — SIGNIFICANT CHANGE UP (ref 0–4)
SODIUM SERPL-SCNC: 115 MMOL/L — CRITICAL LOW (ref 135–145)
SP GR SPEC: 1.01 — SIGNIFICANT CHANGE UP (ref 1–1.03)
SQUAMOUS # UR AUTO: 2 /HPF — SIGNIFICANT CHANGE UP (ref 0–5)
TROPONIN I, HIGH SENSITIVITY RESULT: 5.62 NG/L — SIGNIFICANT CHANGE UP
UROBILINOGEN FLD QL: 0.2 MG/DL — SIGNIFICANT CHANGE UP (ref 0.2–1)
WBC # BLD: 19.17 K/UL — HIGH (ref 3.8–10.5)
WBC # FLD AUTO: 19.17 K/UL — HIGH (ref 3.8–10.5)
WBC UR QL: 19 /HPF — HIGH (ref 0–5)

## 2024-08-27 PROCEDURE — 70450 CT HEAD/BRAIN W/O DYE: CPT | Mod: 26,MC

## 2024-08-27 PROCEDURE — 87040 BLOOD CULTURE FOR BACTERIA: CPT

## 2024-08-27 PROCEDURE — 92610 EVALUATE SWALLOWING FUNCTION: CPT | Mod: GN

## 2024-08-27 PROCEDURE — 84449 ASSAY OF TRANSCORTIN: CPT

## 2024-08-27 PROCEDURE — 71045 X-RAY EXAM CHEST 1 VIEW: CPT | Mod: 26

## 2024-08-27 PROCEDURE — 93005 ELECTROCARDIOGRAM TRACING: CPT

## 2024-08-27 PROCEDURE — 87086 URINE CULTURE/COLONY COUNT: CPT

## 2024-08-27 PROCEDURE — 92523 SPEECH SOUND LANG COMPREHEN: CPT | Mod: GN

## 2024-08-27 PROCEDURE — 80069 RENAL FUNCTION PANEL: CPT

## 2024-08-27 PROCEDURE — 97162 PT EVAL MOD COMPLEX 30 MIN: CPT | Mod: GP

## 2024-08-27 PROCEDURE — 85027 COMPLETE CBC AUTOMATED: CPT

## 2024-08-27 PROCEDURE — 74176 CT ABD & PELVIS W/O CONTRAST: CPT | Mod: 26

## 2024-08-27 PROCEDURE — 84443 ASSAY THYROID STIM HORMONE: CPT

## 2024-08-27 PROCEDURE — 99291 CRITICAL CARE FIRST HOUR: CPT | Mod: FS

## 2024-08-27 PROCEDURE — 84550 ASSAY OF BLOOD/URIC ACID: CPT

## 2024-08-27 PROCEDURE — 85025 COMPLETE CBC W/AUTO DIFF WBC: CPT

## 2024-08-27 PROCEDURE — 74176 CT ABD & PELVIS W/O CONTRAST: CPT | Mod: MC

## 2024-08-27 PROCEDURE — 85610 PROTHROMBIN TIME: CPT

## 2024-08-27 PROCEDURE — 87081 CULTURE SCREEN ONLY: CPT

## 2024-08-27 PROCEDURE — 81001 URINALYSIS AUTO W/SCOPE: CPT

## 2024-08-27 PROCEDURE — 97116 GAIT TRAINING THERAPY: CPT | Mod: GP

## 2024-08-27 PROCEDURE — 71045 X-RAY EXAM CHEST 1 VIEW: CPT

## 2024-08-27 PROCEDURE — 83735 ASSAY OF MAGNESIUM: CPT

## 2024-08-27 PROCEDURE — 83605 ASSAY OF LACTIC ACID: CPT

## 2024-08-27 PROCEDURE — 36415 COLL VENOUS BLD VENIPUNCTURE: CPT

## 2024-08-27 PROCEDURE — 82533 TOTAL CORTISOL: CPT

## 2024-08-27 PROCEDURE — 80053 COMPREHEN METABOLIC PANEL: CPT

## 2024-08-27 PROCEDURE — 83935 ASSAY OF URINE OSMOLALITY: CPT

## 2024-08-27 PROCEDURE — 84300 ASSAY OF URINE SODIUM: CPT

## 2024-08-27 PROCEDURE — 97530 THERAPEUTIC ACTIVITIES: CPT | Mod: GP

## 2024-08-27 PROCEDURE — 83930 ASSAY OF BLOOD OSMOLALITY: CPT

## 2024-08-27 PROCEDURE — 93010 ELECTROCARDIOGRAM REPORT: CPT

## 2024-08-27 PROCEDURE — 80048 BASIC METABOLIC PNL TOTAL CA: CPT

## 2024-08-27 RX ORDER — PANTOPRAZOLE SODIUM 40 MG
40 TABLET, DELAYED RELEASE (ENTERIC COATED) ORAL
Refills: 0 | Status: DISCONTINUED | OUTPATIENT
Start: 2024-08-27 | End: 2024-08-28

## 2024-08-27 RX ORDER — SODIUM CHLORIDE 9 MG/ML
1000 INJECTION INTRAMUSCULAR; INTRAVENOUS; SUBCUTANEOUS
Refills: 0 | Status: DISCONTINUED | OUTPATIENT
Start: 2024-08-27 | End: 2024-08-28

## 2024-08-27 RX ORDER — PIPERACILLIN SODIUM AND TAZOBACTAM SODIUM 3; .375 G/15ML; G/15ML
3.38 INJECTION, POWDER, FOR SOLUTION INTRAVENOUS EVERY 8 HOURS
Refills: 0 | Status: COMPLETED | OUTPATIENT
Start: 2024-08-28 | End: 2024-09-04

## 2024-08-27 RX ORDER — PIPERACILLIN SODIUM AND TAZOBACTAM SODIUM 3; .375 G/15ML; G/15ML
3.38 INJECTION, POWDER, FOR SOLUTION INTRAVENOUS ONCE
Refills: 0 | Status: COMPLETED | OUTPATIENT
Start: 2024-08-28 | End: 2024-08-28

## 2024-08-27 RX ORDER — METOCLOPRAMIDE HCL 5 MG
1 TABLET ORAL
Refills: 0 | DISCHARGE

## 2024-08-27 RX ORDER — ROSUVASTATIN CALCIUM 10 MG/1
5 TABLET ORAL AT BEDTIME
Refills: 0 | Status: DISCONTINUED | OUTPATIENT
Start: 2024-08-27 | End: 2024-09-04

## 2024-08-27 RX ORDER — PIPERACILLIN SODIUM AND TAZOBACTAM SODIUM 3; .375 G/15ML; G/15ML
3.38 INJECTION, POWDER, FOR SOLUTION INTRAVENOUS ONCE
Refills: 0 | Status: COMPLETED | OUTPATIENT
Start: 2024-08-27 | End: 2024-08-27

## 2024-08-27 RX ORDER — AMLODIPINE BESYLATE 10 MG/1
5 TABLET ORAL DAILY
Refills: 0 | Status: DISCONTINUED | OUTPATIENT
Start: 2024-08-27 | End: 2024-09-04

## 2024-08-27 RX ORDER — PANTOPRAZOLE SODIUM 40 MG
1 TABLET, DELAYED RELEASE (ENTERIC COATED) ORAL
Refills: 0 | DISCHARGE

## 2024-08-27 RX ORDER — SODIUM CHLORIDE 9 MG/ML
1000 INJECTION INTRAMUSCULAR; INTRAVENOUS; SUBCUTANEOUS ONCE
Refills: 0 | Status: COMPLETED | OUTPATIENT
Start: 2024-08-27 | End: 2024-08-27

## 2024-08-27 RX ORDER — MECLIZINE HYDROCHLORIDE 25 MG/1
12.5 TABLET ORAL
Refills: 0 | Status: DISCONTINUED | OUTPATIENT
Start: 2024-08-27 | End: 2024-09-04

## 2024-08-27 RX ADMIN — SODIUM CHLORIDE 75 MILLILITER(S): 9 INJECTION INTRAMUSCULAR; INTRAVENOUS; SUBCUTANEOUS at 23:08

## 2024-08-27 RX ADMIN — SODIUM CHLORIDE 1000 MILLILITER(S): 9 INJECTION INTRAMUSCULAR; INTRAVENOUS; SUBCUTANEOUS at 21:04

## 2024-08-27 RX ADMIN — PIPERACILLIN SODIUM AND TAZOBACTAM SODIUM 200 GRAM(S): 3; .375 INJECTION, POWDER, FOR SOLUTION INTRAVENOUS at 23:08

## 2024-08-27 NOTE — H&P ADULT - NSHPLABSRESULTS_GEN_ALL_CORE
08-27  115<LL>  |  81<L>  |  13  ----------------------------<  125<H>  3.7   |  26  |  1.02  Ca    9.5      27 Aug 2024 19:26  Mg     2.6     08-27  TPro  8.7<H>  /  Alb  4.1  /  TBili  0.6  /  DBili  x   /  AST  16  /  ALT  17  /  AlkPhos  125<H>  08-27                       16.3   19.17 )-----------( 513      ( 27 Aug 2024 19:26 )             45.1     LIVER FUNCTIONS - ( 27 Aug 2024 19:26 )  Alb: 4.1 g/dL / Pro: 8.7 gm/dL / ALK PHOS: 125 U/L / ALT: 17 U/L / AST: 16 U/L / GGT: x             CAPILLARY BLOOD GLUCOSE

## 2024-08-27 NOTE — ED PROVIDER NOTE - OBJECTIVE STATEMENT
PA: Patient is an 84 year old female with PMHx of memory disorder, undifferentiated somatoform disorder, Crohn's disease, osteoporosis, MDD, IBD, GERD s/p recent EGD who presents to ED c/o low sodium levels. Patient c/o general malaise and fatigue. DENIES n/v/d, CP, SOB, dizziness. Patient's son received a phone call from patient's GI doctor about low sodium levels, and was advised to bring patient to ED for further evaluation. ~Ming Mac PA-C

## 2024-08-27 NOTE — ED PROVIDER NOTE - CPE EDP [facility] NORM
[de-identified] : 36 year old woman with a long-standing history of morbid obesity, who has attempted numerous weight loss treatments without long term success. Patient is familiar with the Laparoscopic Adjustable Gastric Band, the Laparoscopic Sleeve Gastrectomy and the Laparoscopic Gastric Bypass. Patient presents today to discuss options for surgery.
normal...

## 2024-08-27 NOTE — ED ADULT TRIAGE NOTE - CHIEF COMPLAINT QUOTE
pt presents to the ED for heartburn/ GERD symptoms, nauesa, abnormal labs high wbc, low sodium. decreased po intake. pt denies chest pain, palpitations. nkda no other complaints at this time

## 2024-08-27 NOTE — PHARMACOTHERAPY INTERVENTION NOTE - COMMENTS
Medication reconciliation completed.  Patient was unable to provide medication information, spoke to son Dougie ([phone]) and they provided current medication list; confirmed with Dr. First Lua.  Dougie states that pt has been struggling with chronic nausea & dizziness, and with her short-term memory & depression and that several meds have been stopped (Colazal & Amlodipine) due to risk of dizziness.

## 2024-08-27 NOTE — H&P ADULT - ASSESSMENT
84 year old female  84 year old female admitted to ICU for hyponatremia and altered mental status     Altered Mental Status   -likely secondary to combination fo UTI and hyponatremia  -Poor memory is in history unsure if there's a level of dementia as well  -will monitor     Hyponatremia   -likely secondary to hypovolemia  -history of weeks of nausea vomiting and low PO intake  -gentle hydration with normal sailne   -sereial repeat BMP to follow Na+ levles  -monitor for decreasing mental status or seizure    UTI  -continue antibiotics   -Arguelles catherter  -Urine output >0.5cc/kg/hr  -follow up urine culture results     ANICETO   -Hold nephrotoxic meds  -Continue aggressive hydration  -Trend urine output  -Follow up BUN/Creatinine  -follow up electrolytes     Esential hypertension  -start home dose amlodipine   -Vitals Q 4 Hrs  -Low Sodium Diet when start feeding   -daily  BMP     Prophylactic measures  -L ovenox or  heparin SQ for DVT with SD'S  -protonix for GI  -aspiration precautions by keeping head of bed at 30 degrees     Critical Care time: 40  mins assessing presenting problems of acute illness that poses high probability of life threatening deterioration or end organ damage/dysfunction.  Medical decision making inculding Initiating plan of care, reviewing data, reviewing radiology,direct patient bedside evaluation and interpretation of vital signs, any necessary ventilator management , discusion with multidisciplinary team, discussing goals of care with patient/family, all non inclusive of procedures, COVID 19 specific considerations and therapeutic  options based on the available and rapidly changing literature

## 2024-08-27 NOTE — ED ADULT TRIAGE NOTE - STATUS:
Applied
Alert-The patient is alert, awake and responds to voice. The patient is oriented to time, place, and person. The triage nurse is able to obtain subjective information.

## 2024-08-27 NOTE — ED ADULT NURSE NOTE - NSFALLHARMRISKINTERV_ED_ALL_ED
Assistance OOB with selected safe patient handling equipment if applicable/Communicate risk of Fall with Harm to all staff, patient, and family/Provide visual cue: red socks, yellow wristband, yellow gown, etc/Reinforce activity limits and safety measures with patient and family/Bed in lowest position, wheels locked, appropriate side rails in place/Call bell, personal items and telephone in reach/Instruct patient to call for assistance before getting out of bed/chair/stretcher/Non-slip footwear applied when patient is off stretcher/[place] to call system/Physically safe environment - no spills, clutter or unnecessary equipment/Purposeful Proactive Rounding/Room/bathroom lighting operational, light cord in reach

## 2024-08-27 NOTE — ED PROVIDER NOTE - CLINICAL SUMMARY MEDICAL DECISION MAKING FREE TEXT BOX
84 year old female with PMHx of memory disorder, undifferentiated somatoform disorder, Crohn's disease, osteoporosis, MDD, IBD, GERD s/p recent EGD presenting for abnormal labs including low sodium ,hi gh white count, high platelets. Pt reports months of nausea, vomiting, and confusion, Reports was at her GI, had labs and told she had abnormal labs so she came to the ED. Endorses she has chronic chest pain secondary to acid reflux, has seen gastroenterologist for same. Per son at bedside, pt is being worked up for thrombocytosis and elevated white count. Was referred to hematologist but is yet to see hematologist. Exam: chest clear, abd nontender, legs not swollen. Concern for hyponatremia. Will check labs, urine, CT head, 1L NS, and admit. 84 year old female with PMHx of memory disorder, undifferentiated somatoform disorder, Crohn's disease, osteoporosis, MDD, IBD, GERD s/p recent EGD presenting for abnormal labs including low sodium, high white count and high platelets. Pt reports months of nausea, vomiting, and confusion, Reports was at her GI, had labs and told she had abnormal labs so she came to the ED. Endorses she has chronic chest pain secondary to acid reflux, has seen gastroenterologist for same. Per son at bedside, pt is being worked up for thrombocytosis and elevated white count. Was referred to hematologist but is yet to see hematologist. Exam: chest clear, abd nontender, legs not swollen. Concern for hyponatremia. Will check labs, urine, CT head, 1L NS, and admit.     ICU consult for . - per ICU PA, patient to be admitted to ICU for hyponatremia

## 2024-08-27 NOTE — H&P ADULT - HISTORY OF PRESENT ILLNESS
84 year old female  PMHx of memory disorder, undifferentiated somatoform disorder, Crohn's disease, osteoporosis, MDD, IBD, GERD s/p recent EGD . She presented  to ER with low sodium levels that were reported from her GI doctor based on blood work drawn at appointment  and was advised to bring patient to ED   She has been  c/o general malaise and fatigue with nausea and vomiting  for weeks  per the notes.     In the ER she is confused to place and time verbally she denies  CP, SOB, dizziness. Labs found her to be hyponatremic with sodium or 115 she has leukocytosis with WBC 19. I performed bedside POCUS and found her intravascularly depleted with flattening IVC on respirations indicating likely  hypovolemic hyponatremia.   84 year old female  PMHx of memory disorder, undifferentiated somatoform disorder, Crohn's disease, osteoporosis, MDD, IBD, GERD s/p recent EGD . She presented  to ER with low sodium levels that were reported from her GI doctor based on blood work drawn at appointment  and was advised to bring patient to ED   She has been  c/o general malaise and fatigue with nausea and vomiting  for weeks  per the notes.     In the ER she is confused to place and time verbally she denies  CP, SOB, dizziness. Labs found her to be hyponatremic with sodium or 115 she has leukocytosis with WBC 19. I performed bedside POCUS and found her intravascularly depleted with flattening IVC on respirations indicating likely  hypovolemic hyponatremia. The UA has returned to show eveidence of UTI for which she was started on antibiotics

## 2024-08-27 NOTE — ED ADULT NURSE NOTE - OBJECTIVE STATEMENT
pt presents to the ED for heartburn/ GERD symptoms, nauesa, abnormal labs high wbc, low sodium. decreased po intake. pt denies chest pain, palpitations. nkda no other complaints at this time. A&Ox2 at baseline per son

## 2024-08-27 NOTE — ED PROVIDER NOTE - PHYSICAL EXAMINATION
PA NOTE: GEN: AOX3, NAD. HEENT: Throat clear. Airway is patent. EYES: PERRLA. EOMI. Head: NC/AT. NECK: Supple, No JVD. FROM. C-spine non-tender. CV:S1S2, RRR, LUNGS: Non-labored breathing, no tachypnea. O2sat 100% RA. CTA b/l. No w/r/r. CHEST: Equal chest expansion and rise. No deformity. ABD: Soft, NT/ND, no rebound, no guarding. No CVAT. EXT: No e/c/c. 2+ distal pulses. SKIN: No rashes. NEURO: No focal deficits. CN II-XII intact. FROM. 5/5 motor and sensory. ~Ming Mac PA-C

## 2024-08-27 NOTE — H&P ADULT - NSHPPHYSICALEXAM_GEN_ALL_CORE
Constitutional: NAD, well-groomed, well-developed  HEENT: PERRLA, EOMI, no drainage or redness, airway patent   Neck: No bruits; no thyromegaly or nodules,  No JVD  Back: Normal spine flexure, No CVA tenderness, No deformity or limitation of movement  Respiratory: Breath Sounds equal & clear to percussion & auscultation, no accessory muscle use  Cardiovascular: Regular rate & rhythm, normal S1, S2; no murmurs, gallops or rubs; no S3, S4  Gastrointestinal: Soft, non-tender, non distended no hepatosplenomegaly, normal bowel sounds  Extremities: No peripheral edema, No cyanosis, clubbing   Vascular: Equal and normal pulses: 2+ peripheral pulses throughout  Neurological: , CN2-12 intact, PERRLA, EOMI,  Motor Strength 5/5 B/L upper and lower extremities; DTRs 2+ intact and symmetric, normal sensory exam  Psychiatric: Normal mood, normal affect  Musculoskeletal: No joint pain, swelling or deformity; no limitation of movement  Skin: No rashes, Warm and well perfused    ICU Vital Signs Last 24 Hrs  T(C): 36.6 (27 Aug 2024 23:00), Max: 36.7 (27 Aug 2024 22:07)  T(F): 97.8 (27 Aug 2024 23:00), Max: 98 (27 Aug 2024 22:07)  HR: 82 (27 Aug 2024 23:00) (79 - 82)  BP: 149/63 (27 Aug 2024 23:00) (111/67 - 149/63)  BP(mean): 89 (27 Aug 2024 23:00) (82 - 90)  ABP: --  ABP(mean): --  RR: 19 (27 Aug 2024 23:00) (16 - 19)  SpO2: 98% (27 Aug 2024 23:00) (95% - 100%)    O2 Parameters below as of 27 Aug 2024 23:00  Patient On (Oxygen Delivery Method): room air

## 2024-08-27 NOTE — ED PROVIDER NOTE - CRITICAL CARE ATTENDING CONTRIBUTION TO CARE
I personally saw the patient.  TARA and I provided critical care for a total of 35 minutes; I provided the majority of the critical care time      Elements for critical care include direct patient care (not related to procedure), additional history taking, interpretation of diagnostic studies, documentation, consultation with other physicians, consult w/ pt's family directly relating to pts condition

## 2024-08-28 LAB
ALBUMIN SERPL ELPH-MCNC: 3.3 G/DL — SIGNIFICANT CHANGE UP (ref 3.3–5)
ALP SERPL-CCNC: 97 U/L — SIGNIFICANT CHANGE UP (ref 40–120)
ALT FLD-CCNC: 13 U/L — SIGNIFICANT CHANGE UP (ref 12–78)
ANION GAP SERPL CALC-SCNC: 6 MMOL/L — SIGNIFICANT CHANGE UP (ref 5–17)
ANION GAP SERPL CALC-SCNC: 6 MMOL/L — SIGNIFICANT CHANGE UP (ref 5–17)
ANION GAP SERPL CALC-SCNC: 7 MMOL/L — SIGNIFICANT CHANGE UP (ref 5–17)
ANION GAP SERPL CALC-SCNC: 8 MMOL/L — SIGNIFICANT CHANGE UP (ref 5–17)
ANION GAP SERPL CALC-SCNC: 9 MMOL/L — SIGNIFICANT CHANGE UP (ref 5–17)
AST SERPL-CCNC: 12 U/L — LOW (ref 15–37)
BILIRUB SERPL-MCNC: 0.6 MG/DL — SIGNIFICANT CHANGE UP (ref 0.2–1.2)
BUN SERPL-MCNC: 5 MG/DL — LOW (ref 7–23)
BUN SERPL-MCNC: 6 MG/DL — LOW (ref 7–23)
BUN SERPL-MCNC: 6 MG/DL — LOW (ref 7–23)
BUN SERPL-MCNC: 8 MG/DL — SIGNIFICANT CHANGE UP (ref 7–23)
BUN SERPL-MCNC: 9 MG/DL — SIGNIFICANT CHANGE UP (ref 7–23)
CALCIUM SERPL-MCNC: 7.9 MG/DL — LOW (ref 8.5–10.1)
CALCIUM SERPL-MCNC: 8 MG/DL — LOW (ref 8.5–10.1)
CALCIUM SERPL-MCNC: 8.3 MG/DL — LOW (ref 8.5–10.1)
CALCIUM SERPL-MCNC: 8.5 MG/DL — SIGNIFICANT CHANGE UP (ref 8.5–10.1)
CALCIUM SERPL-MCNC: 8.7 MG/DL — SIGNIFICANT CHANGE UP (ref 8.5–10.1)
CHLORIDE SERPL-SCNC: 93 MMOL/L — LOW (ref 96–108)
CHLORIDE SERPL-SCNC: 95 MMOL/L — LOW (ref 96–108)
CHLORIDE SERPL-SCNC: 96 MMOL/L — SIGNIFICANT CHANGE UP (ref 96–108)
CHLORIDE SERPL-SCNC: 96 MMOL/L — SIGNIFICANT CHANGE UP (ref 96–108)
CHLORIDE SERPL-SCNC: 99 MMOL/L — SIGNIFICANT CHANGE UP (ref 96–108)
CO2 SERPL-SCNC: 20 MMOL/L — LOW (ref 22–31)
CO2 SERPL-SCNC: 22 MMOL/L — SIGNIFICANT CHANGE UP (ref 22–31)
CO2 SERPL-SCNC: 25 MMOL/L — SIGNIFICANT CHANGE UP (ref 22–31)
CO2 SERPL-SCNC: 25 MMOL/L — SIGNIFICANT CHANGE UP (ref 22–31)
CO2 SERPL-SCNC: 27 MMOL/L — SIGNIFICANT CHANGE UP (ref 22–31)
CREAT SERPL-MCNC: 0.66 MG/DL — SIGNIFICANT CHANGE UP (ref 0.5–1.3)
CREAT SERPL-MCNC: 0.73 MG/DL — SIGNIFICANT CHANGE UP (ref 0.5–1.3)
CREAT SERPL-MCNC: 0.76 MG/DL — SIGNIFICANT CHANGE UP (ref 0.5–1.3)
CREAT SERPL-MCNC: 0.77 MG/DL — SIGNIFICANT CHANGE UP (ref 0.5–1.3)
CREAT SERPL-MCNC: 0.89 MG/DL — SIGNIFICANT CHANGE UP (ref 0.5–1.3)
EGFR: 64 ML/MIN/1.73M2 — SIGNIFICANT CHANGE UP
EGFR: 76 ML/MIN/1.73M2 — SIGNIFICANT CHANGE UP
EGFR: 78 ML/MIN/1.73M2 — SIGNIFICANT CHANGE UP
EGFR: 82 ML/MIN/1.73M2 — SIGNIFICANT CHANGE UP
EGFR: 87 ML/MIN/1.73M2 — SIGNIFICANT CHANGE UP
GLUCOSE SERPL-MCNC: 104 MG/DL — HIGH (ref 70–99)
GLUCOSE SERPL-MCNC: 110 MG/DL — HIGH (ref 70–99)
GLUCOSE SERPL-MCNC: 111 MG/DL — HIGH (ref 70–99)
GLUCOSE SERPL-MCNC: 124 MG/DL — HIGH (ref 70–99)
GLUCOSE SERPL-MCNC: 140 MG/DL — HIGH (ref 70–99)
HCT VFR BLD CALC: 40 % — SIGNIFICANT CHANGE UP (ref 34.5–45)
HGB BLD-MCNC: 14 G/DL — SIGNIFICANT CHANGE UP (ref 11.5–15.5)
INR BLD: 1.03 RATIO — SIGNIFICANT CHANGE UP (ref 0.85–1.18)
MAGNESIUM SERPL-MCNC: 2.5 MG/DL — SIGNIFICANT CHANGE UP (ref 1.6–2.6)
MCHC RBC-ENTMCNC: 29.1 PG — SIGNIFICANT CHANGE UP (ref 27–34)
MCHC RBC-ENTMCNC: 35 GM/DL — SIGNIFICANT CHANGE UP (ref 32–36)
MCV RBC AUTO: 83.2 FL — SIGNIFICANT CHANGE UP (ref 80–100)
OSMOLALITY SERPL: 261 MOSMOL/KG — LOW (ref 280–301)
OSMOLALITY UR: 93 MOSM/KG — SIGNIFICANT CHANGE UP (ref 50–1200)
PLATELET # BLD AUTO: 433 K/UL — HIGH (ref 150–400)
POTASSIUM SERPL-MCNC: 3.1 MMOL/L — LOW (ref 3.5–5.3)
POTASSIUM SERPL-MCNC: 3.3 MMOL/L — LOW (ref 3.5–5.3)
POTASSIUM SERPL-MCNC: 3.3 MMOL/L — LOW (ref 3.5–5.3)
POTASSIUM SERPL-MCNC: 3.9 MMOL/L — SIGNIFICANT CHANGE UP (ref 3.5–5.3)
POTASSIUM SERPL-MCNC: 4.8 MMOL/L — SIGNIFICANT CHANGE UP (ref 3.5–5.3)
POTASSIUM SERPL-SCNC: 3.1 MMOL/L — LOW (ref 3.5–5.3)
POTASSIUM SERPL-SCNC: 3.3 MMOL/L — LOW (ref 3.5–5.3)
POTASSIUM SERPL-SCNC: 3.3 MMOL/L — LOW (ref 3.5–5.3)
POTASSIUM SERPL-SCNC: 3.9 MMOL/L — SIGNIFICANT CHANGE UP (ref 3.5–5.3)
POTASSIUM SERPL-SCNC: 4.8 MMOL/L — SIGNIFICANT CHANGE UP (ref 3.5–5.3)
PROT SERPL-MCNC: 7.1 GM/DL — SIGNIFICANT CHANGE UP (ref 6–8.3)
PROTHROM AB SERPL-ACNC: 11.6 SEC — SIGNIFICANT CHANGE UP (ref 9.5–13)
RBC # BLD: 4.81 M/UL — SIGNIFICANT CHANGE UP (ref 3.8–5.2)
RBC # FLD: 12.4 % — SIGNIFICANT CHANGE UP (ref 10.3–14.5)
SODIUM SERPL-SCNC: 122 MMOL/L — LOW (ref 135–145)
SODIUM SERPL-SCNC: 124 MMOL/L — LOW (ref 135–145)
SODIUM SERPL-SCNC: 128 MMOL/L — LOW (ref 135–145)
SODIUM SERPL-SCNC: 129 MMOL/L — LOW (ref 135–145)
SODIUM SERPL-SCNC: 131 MMOL/L — LOW (ref 135–145)
SODIUM UR-SCNC: <20 MMOL/L — SIGNIFICANT CHANGE UP
TSH SERPL-MCNC: 0.78 UU/ML — SIGNIFICANT CHANGE UP (ref 0.34–4.82)
URATE SERPL-MCNC: 2.4 MG/DL — LOW (ref 2.5–7)
WBC # BLD: 17.62 K/UL — HIGH (ref 3.8–10.5)
WBC # FLD AUTO: 17.62 K/UL — HIGH (ref 3.8–10.5)

## 2024-08-28 PROCEDURE — 99233 SBSQ HOSP IP/OBS HIGH 50: CPT

## 2024-08-28 PROCEDURE — 99223 1ST HOSP IP/OBS HIGH 75: CPT

## 2024-08-28 RX ORDER — DESMOPRESSIN ACETATE 4 UG/ML
1 INJECTION, SOLUTION INTRAVENOUS; SUBCUTANEOUS ONCE
Refills: 0 | Status: COMPLETED | OUTPATIENT
Start: 2024-08-28 | End: 2024-08-28

## 2024-08-28 RX ORDER — POTASSIUM CHLORIDE 10 MEQ
10 TABLET, EXT RELEASE, PARTICLES/CRYSTALS ORAL
Refills: 0 | Status: COMPLETED | OUTPATIENT
Start: 2024-08-28 | End: 2024-08-28

## 2024-08-28 RX ORDER — ONDANSETRON 2 MG/ML
4 INJECTION, SOLUTION INTRAMUSCULAR; INTRAVENOUS EVERY 8 HOURS
Refills: 0 | Status: DISCONTINUED | OUTPATIENT
Start: 2024-08-28 | End: 2024-09-04

## 2024-08-28 RX ORDER — POTASSIUM CHLORIDE 10 MEQ
40 TABLET, EXT RELEASE, PARTICLES/CRYSTALS ORAL ONCE
Refills: 0 | Status: COMPLETED | OUTPATIENT
Start: 2024-08-28 | End: 2024-08-29

## 2024-08-28 RX ORDER — ONDANSETRON 2 MG/ML
4 INJECTION, SOLUTION INTRAMUSCULAR; INTRAVENOUS ONCE
Refills: 0 | Status: COMPLETED | OUTPATIENT
Start: 2024-08-28 | End: 2024-08-28

## 2024-08-28 RX ORDER — ENOXAPARIN SODIUM 100 MG/ML
40 INJECTION SUBCUTANEOUS EVERY 24 HOURS
Refills: 0 | Status: DISCONTINUED | OUTPATIENT
Start: 2024-08-28 | End: 2024-09-04

## 2024-08-28 RX ORDER — DESMOPRESSIN ACETATE 4 UG/ML
2 INJECTION, SOLUTION INTRAVENOUS; SUBCUTANEOUS ONCE
Refills: 0 | Status: COMPLETED | OUTPATIENT
Start: 2024-08-28 | End: 2024-08-28

## 2024-08-28 RX ORDER — PANTOPRAZOLE SODIUM 40 MG
40 TABLET, DELAYED RELEASE (ENTERIC COATED) ORAL DAILY
Refills: 0 | Status: DISCONTINUED | OUTPATIENT
Start: 2024-08-28 | End: 2024-08-29

## 2024-08-28 RX ADMIN — ROSUVASTATIN CALCIUM 5 MILLIGRAM(S): 10 TABLET ORAL at 21:48

## 2024-08-28 RX ADMIN — Medication 75 MILLILITER(S): at 09:41

## 2024-08-28 RX ADMIN — PIPERACILLIN SODIUM AND TAZOBACTAM SODIUM 25 GRAM(S): 3; .375 INJECTION, POWDER, FOR SOLUTION INTRAVENOUS at 03:31

## 2024-08-28 RX ADMIN — Medication 100 MILLIEQUIVALENT(S): at 11:12

## 2024-08-28 RX ADMIN — ENOXAPARIN SODIUM 40 MILLIGRAM(S): 100 INJECTION SUBCUTANEOUS at 10:47

## 2024-08-28 RX ADMIN — PIPERACILLIN SODIUM AND TAZOBACTAM SODIUM 3.38 GRAM(S): 3; .375 INJECTION, POWDER, FOR SOLUTION INTRAVENOUS at 07:31

## 2024-08-28 RX ADMIN — Medication 250 MILLILITER(S): at 03:33

## 2024-08-28 RX ADMIN — Medication 100 MILLIEQUIVALENT(S): at 13:15

## 2024-08-28 RX ADMIN — PIPERACILLIN SODIUM AND TAZOBACTAM SODIUM 25 GRAM(S): 3; .375 INJECTION, POWDER, FOR SOLUTION INTRAVENOUS at 14:12

## 2024-08-28 RX ADMIN — Medication 100 MILLIEQUIVALENT(S): at 14:12

## 2024-08-28 RX ADMIN — Medication 40 MILLIGRAM(S): at 10:47

## 2024-08-28 RX ADMIN — ONDANSETRON 4 MILLIGRAM(S): 2 INJECTION, SOLUTION INTRAMUSCULAR; INTRAVENOUS at 10:47

## 2024-08-28 RX ADMIN — DESMOPRESSIN ACETATE 1 MICROGRAM(S): 4 INJECTION, SOLUTION INTRAVENOUS; SUBCUTANEOUS at 13:35

## 2024-08-28 RX ADMIN — PIPERACILLIN SODIUM AND TAZOBACTAM SODIUM 25 GRAM(S): 3; .375 INJECTION, POWDER, FOR SOLUTION INTRAVENOUS at 21:48

## 2024-08-28 RX ADMIN — AMLODIPINE BESYLATE 5 MILLIGRAM(S): 10 TABLET ORAL at 10:47

## 2024-08-28 RX ADMIN — PIPERACILLIN SODIUM AND TAZOBACTAM SODIUM 25 GRAM(S): 3; .375 INJECTION, POWDER, FOR SOLUTION INTRAVENOUS at 09:41

## 2024-08-28 RX ADMIN — PIPERACILLIN SODIUM AND TAZOBACTAM SODIUM 3.38 GRAM(S): 3; .375 INJECTION, POWDER, FOR SOLUTION INTRAVENOUS at 13:41

## 2024-08-28 RX ADMIN — Medication 40 MILLIGRAM(S): at 11:12

## 2024-08-28 NOTE — PATIENT PROFILE ADULT - NSFALLSECTIONLABEL_GEN_A_CORE
no . Mart-1 - Negative Histology Text: MART-1 staining demonstrates a normal density and pattern of melanocytes along the dermal-epidermal junction. The surgical margins are negative for tumor cells.

## 2024-08-28 NOTE — PATIENT PROFILE ADULT - FALL HARM RISK - HARM RISK INTERVENTIONS
Assistance with ambulation/Assistance OOB with selected safe patient handling equipment/Communicate Risk of Fall with Harm to all staff/Discuss with provider need for PT consult/Monitor for mental status changes/Monitor gait and stability/Move patient closer to nurses' station/Provide patient with walking aids - walker, cane, crutches/Reinforce activity limits and safety measures with patient and family/Reorient to person, place and time as needed/Sit up slowly, dangle for a short time, stand at bedside before walking/Tailored Fall Risk Interventions/Toileting schedule using arm’s reach rule for commode and bathroom/Use of alarms - bed, chair and/or voice tab/Visual Cue: Yellow wristband and red socks/Bed in lowest position, wheels locked, appropriate side rails in place/Call bell, personal items and telephone in reach/Instruct patient to call for assistance before getting out of bed or chair/Non-slip footwear when patient is out of bed/[place] to call system/Physically safe environment - no spills, clutter or unnecessary equipment/Purposeful Proactive Rounding/Room/bathroom lighting operational, light cord in reach

## 2024-08-28 NOTE — CONSULT NOTE ADULT - SUBJECTIVE AND OBJECTIVE BOX
Chief complaints.   sent to ED due to Hyponatremia    HPI:  83 yo woman with PMHX of Crohn's disease, memory disorder,undifferentiated somatoform disorder, IBD and GERD post recent EGD due to nausea and vomiting for several weeks.  Sent to ED per GI due to hyponatremia.  In ED, Serum Na of 115 with exam c/w volume depletion.  HH admission in 2024 with acute hyponatremia post starting diuretics--resolved off meds.    PMHX & PSHX  --Hyponatremia ( 2024 post start of diuretics)  --Crohs's disease.  --IBD  --Memory disorder  --Undifferentiated somatoform disorder.    Home Medications:   * Patient Currently Takes Medications as of 27-Aug-2024 23:26 documented in Structured Notes  · 	NIFEdipine 30 mg oral tablet, extended release: Last Dose Taken:  , 1 tab(s) orally once a day  · 	metoclopramide 5 mg oral tablet: Last Dose Taken:  , 1 tab(s) orally 1 to 3 times a day as needed for  nausea  · 	pantoprazole 40 mg oral delayed release tablet: Last Dose Taken:  , 1 tab(s) orally 2 times a day  · 	Colazal 750 mg oral capsule: Last Dose Taken: 25-Aug-2024 , 2 cap(s) orally 2 times a day DC'd by pt's GI 2 days ago to prevent extra dizziness/nausea  · 	escitalopram 5 mg oral tablet: Last Dose Taken:  , 1 tab(s) orally once a day for chronic dizziness  · 	Humira 40 mg/0.8 mL subcutaneous kit: Last Dose Taken:  , 40 milligram(s) subcutaneously every 2 weeks  · 	Crestor 5 mg oral tablet: Last Dose Taken:  , 1 tab(s) orally once a day    FAMILY HISTORY:      SOCIAL HISTORY : No hx of smoking or ETOH    Allergies :  No Known Allergies    REVIEW OF SYSTEMS :          MEDICATIONS  (STANDING):  amLODIPine   Tablet 5 milliGRAM(s) Oral daily  dextrose 5%. 1000 milliLiter(s) (50 mL/Hr) IV Continuous <Continuous>  enoxaparin Injectable 40 milliGRAM(s) SubCutaneous every 24 hours  pantoprazole  Injectable 40 milliGRAM(s) IV Push daily  piperacillin/tazobactam IVPB.. 3.375 Gram(s) IV Intermittent every 8 hours  potassium chloride  10 mEq/100 mL IVPB 10 milliEquivalent(s) IV Intermittent every 1 hour  rosuvastatin 5 milliGRAM(s) Oral at bedtime    MEDICATIONS  (PRN):  meclizine 12.5 milliGRAM(s) Oral two times a day PRN for dizziness    Vital Signs Last 24 Hrs  T(C): 37.1 (28 Aug 2024 12:00), Max: 37.2 (28 Aug 2024 01:00)  T(F): 98.8 (28 Aug 2024 12:00), Max: 99 (28 Aug 2024 01:00)  HR: 67 (28 Aug 2024 13:) (59 - 82)  BP: 126/68 (28 Aug 2024 13:) (96/84 - 149/63)  BP(mean): 84 (28 Aug 2024 13:) (74 - 99)  RR: 20 (28 Aug 2024 13:) (16 - 24)  SpO2: 98% (28 Aug 2024 12:) (92% - 100%)    Parameters below as of 28 Aug 2024 12:00  Patient On (Oxygen Delivery Method): room air      Daily     Daily Weight in k.8 (28 Aug 2024 01:00)  I&O's Summary    27 Aug 2024 07:01  -  28 Aug 2024 07:00  --------------------------------------------------------  IN: 578.5 mL / OUT: 1530 mL / NET: -951.5 mL    28 Aug 2024 07:01  -  28 Aug 2024 14:02  --------------------------------------------------------  IN: 574 mL / OUT: 327 mL / NET: 247 mL    PHYSICAL EXAM:  GEN:   HEENT:   NECK   CV:   LUNGS:   ABD:   EXT:     LABS:                        14.0   17.62 )-----------( 433      ( 28 Aug 2024 06:55 )             40.0     08-28    131<L>  |  99  |  6<L>  ----------------------------<  140<H>  3.3<L>   |  25  |  0.73    Ca    8.3<L>      28 Aug 2024 11:40  Mg     2.5         TPro  7.1  /  Alb  3.3  /  TBili  0.6  /  DBili  x   /  AST  12<L>  /  ALT  13  /  AlkPhos  97      PT/INR - ( 28 Aug 2024 06:55 )   PT: 11.6 sec;   INR: 1.03 ratio           Urinalysis Basic - ( 28 Aug 2024 11:40 )    Color: x / Appearance: x / SG: x / pH: x  Gluc: 140 mg/dL / Ketone: x  / Bili: x / Urobili: x   Blood: x / Protein: x / Nitrite: x   Leuk Esterase: x / RBC: x / WBC x   Sq Epi: x / Non Sq Epi: x / Bacteria: x      Magnesium: 2.5 mg/dL ( @ 06:55)  Magnesium: 2.6 mg/dL ( @ 19:26)       Chief complaints.   sent to ED due to Hyponatremia    HPI: hx obtained from pt's son at bedside.  83 yo woman with PMHX of Crohn's disease, memory disorder,undifferentiated somatoform disorder, IBD and GERD post recent EGD due to nausea  for several weeks.  Sent to ED per GI due to hyponatremia.  Pt's son reports pt has been feeling poorly and weak and had no po intake for several days.  Pt c/o pain on swallowing and difficulty with food.  No significant abnormality on EGD.    In ED, Serum Na of 115 with exam c/w volume depletion--corrected to 131 with NS, now post Ddavp.  Noted with low urine NA and Osm.    HH admission in 2024 with acute hyponatremia post starting diuretics--resolved off meds.      PMHX & PSHX  --Hyponatremia ( 2024 post start of diuretics)  --Crohs's disease.  --IBD  --Memory disorder  --Undifferentiated somatoform disorder.    Home Medications:   * Patient Currently Takes Medications as of 27-Aug-2024 23:26 documented in Structured Notes  · 	NIFEdipine 30 mg oral tablet, extended release: Last Dose Taken:  , 1 tab(s) orally once a day  · 	metoclopramide 5 mg oral tablet: Last Dose Taken:  , 1 tab(s) orally 1 to 3 times a day as needed for  nausea  · 	pantoprazole 40 mg oral delayed release tablet: Last Dose Taken:  , 1 tab(s) orally 2 times a day  · 	Colazal 750 mg oral capsule: Last Dose Taken: 25-Aug-2024 , 2 cap(s) orally 2 times a day DC'd by pt's GI 2 days ago to prevent extra dizziness/nausea  · 	escitalopram 5 mg oral tablet: Last Dose Taken:  , 1 tab(s) orally once a day for chronic dizziness  · 	Humira 40 mg/0.8 mL subcutaneous kit: Last Dose Taken:  , 40 milligram(s) subcutaneously every 2 weeks  · 	Crestor 5 mg oral tablet: Last Dose Taken:  , 1 tab(s) orally once a day    FAMILY HISTORY: N/C    SOCIAL HISTORY : No hx of smoking or ETOH    Allergies :  No Known Allergies    REVIEW OF SYSTEMS :  Denies SOB  C/o upper chest pain  Difficulty swallowing    MEDICATIONS  (STANDING):  amLODIPine   Tablet 5 milliGRAM(s) Oral daily  dextrose 5%. 1000 milliLiter(s) (50 mL/Hr) IV Continuous <Continuous>  enoxaparin Injectable 40 milliGRAM(s) SubCutaneous every 24 hours  pantoprazole  Injectable 40 milliGRAM(s) IV Push daily  piperacillin/tazobactam IVPB.. 3.375 Gram(s) IV Intermittent every 8 hours  potassium chloride  10 mEq/100 mL IVPB 10 milliEquivalent(s) IV Intermittent every 1 hour  rosuvastatin 5 milliGRAM(s) Oral at bedtime    MEDICATIONS  (PRN):  meclizine 12.5 milliGRAM(s) Oral two times a day PRN for dizziness    Vital Signs Last 24 Hrs  T(C): 37.1 (28 Aug 2024 12:00), Max: 37.2 (28 Aug 2024 01:00)  T(F): 98.8 (28 Aug 2024 12:00), Max: 99 (28 Aug 2024 01:00)  HR: 67 (28 Aug 2024 13:00) (59 - 82)  BP: 126/68 (28 Aug 2024 13:00) (96/84 - 149/63)  BP(mean): 84 (28 Aug 2024 13:00) (74 - 99)  RR: 20 (28 Aug 2024 13:00) (16 - 24)  SpO2: 98% (28 Aug 2024 12:00) (92% - 100%)    Parameters below as of 28 Aug 2024 12:00  Patient On (Oxygen Delivery Method): room air      Daily     Daily Weight in k.8 (28 Aug 2024 01:00)  I&O's Summary    27 Aug 2024 07:  -  28 Aug 2024 07:00  --------------------------------------------------------  IN: 578.5 mL / OUT: 1530 mL / NET: -951.5 mL    28 Aug 2024 07:01  -  28 Aug 2024 14:02  --------------------------------------------------------  IN: 574 mL / OUT: 327 mL / NET: 247 mL    PHYSICAL EXAM:  GEN: no distress  HEENT: WNL  NECK : supple  CV: S1S2 RRR  LUNGS: clear to aus  ABD: soft  EXT: no edema    LABS:                        14.0   17.62 )-----------( 433      ( 28 Aug 2024 06:55 )             40.0         131<L>  |  99  |  6<L>  ----------------------------<  140<H>  3.3<L>   |  25  |  0.73    Ca    8.3<L>      28 Aug 2024 11:40  Mg     2.5         TPro  7.1  /  Alb  3.3  /  TBili  0.6  /  DBili  x   /  AST  12<L>  /  ALT  13  /  AlkPhos  97      PT/INR - ( 28 Aug 2024 06:55 )   PT: 11.6 sec;   INR: 1.03 ratio           Urinalysis Basic - ( 28 Aug 2024 11:40 )    Color: x / Appearance: x / SG: x / pH: x  Gluc: 140 mg/dL / Ketone: x  / Bili: x / Urobili: x   Blood: x / Protein: x / Nitrite: x   Leuk Esterase: x / RBC: x / WBC x   Sq Epi: x / Non Sq Epi: x / Bacteria: x      Magnesium: 2.5 mg/dL ( @ 06:55)  Magnesium: 2.6 mg/dL ( @ 19:26)

## 2024-08-28 NOTE — PROGRESS NOTE ADULT - ASSESSMENT
84 year old female admitted to ICU for hyponatremia and altered mental status   had sodium to 125 in [facility] felt secondary to  HCTZ was seen by nephrology at that [facility]  Altered Mental Status   Hyponatremia   UTI (possible)  HTN         1. Hyponatremia - Patient was on lexapro , will  hold, correction too quickly started on d5w will get follow up bmp at noon        urine chemistries sent  2. UTI was started on Zosyn, await culture  3. nausea, ? related to hyponatremia, not sure cause or effect, on mexlizine, PPI, monitor diet as tolerated  4. Lovenox - DVT prophylaxis  5. encephalopathy hopefully will improve as sodium fixed

## 2024-08-28 NOTE — PATIENT PROFILE ADULT - NSTOBACCONEVERSMOKERY/N_GEN_A
Doxycycline Counseling:  Patient counseled regarding possible photosensitivity and increased risk for sunburn. Patient instructed to avoid sunlight, if possible. When exposed to sunlight, patients should wear protective clothing, sunglasses, and sunscreen. The patient was instructed to call the office immediately if the following severe adverse effects occur:  hearing changes, easy bruising/bleeding, severe headache, or vision changes. The patient verbalized understanding of the proper use and possible adverse effects of doxycycline. All of the patient's questions and concerns were addressed. Isotretinoin Counseling: Patient should get monthly blood tests, not donate blood, not drive at night if vision affected, not share medication, and not undergo elective surgery for 6 months after tx completed. Side effects reviewed, pt to contact office should one occur. Minocycline Counseling: Patient advised regarding possible photosensitivity and discoloration of the teeth, skin, lips, tongue and gums. Patient instructed to avoid sunlight, if possible. When exposed to sunlight, patients should wear protective clothing, sunglasses, and sunscreen. The patient was instructed to call the office immediately if the following severe adverse effects occur:  hearing changes, easy bruising/bleeding, severe headache, or vision changes. The patient verbalized understanding of the proper use and possible adverse effects of minocycline. All of the patient's questions and concerns were addressed. Topical Clindamycin Pregnancy And Lactation Text: This medication is Pregnancy Category B and is considered safe during pregnancy. It is unknown if it is excreted in breast milk. Spironolactone Counseling: Patient advised regarding risks of diarrhea, abdominal pain, hyperkalemia, birth defects (for female patients), liver toxicity and renal toxicity. The patient may need blood work to monitor liver and kidney function and potassium levels while on therapy. The patient verbalized understanding of the proper use and possible adverse effects of spironolactone. All of the patient's questions and concerns were addressed. Winlevi Pregnancy And Lactation Text: This medication is considered safe during pregnancy and breastfeeding. Azithromycin Pregnancy And Lactation Text: This medication is considered safe during pregnancy and is also secreted in breast milk. Birth Control Pills Pregnancy And Lactation Text: This medication should be avoided if pregnant and for the first 30 days post-partum. Dapsone Pregnancy And Lactation Text: This medication is Pregnancy Category C and is not considered safe during pregnancy or breast feeding. Azelaic Acid Counseling: Patient counseled that medicine may cause skin irritation and to avoid applying near the eyes. In the event of skin irritation, the patient was advised to reduce the amount of the drug applied or use it less frequently. The patient verbalized understanding of the proper use and possible adverse effects of azelaic acid. All of the patient's questions and concerns were addressed. Erythromycin Pregnancy And Lactation Text: This medication is Pregnancy Category B and is considered safe during pregnancy. It is also excreted in breast milk. Topical Retinoid counseling:  Patient advised to apply a pea-sized amount only at bedtime and wait 30 minutes after washing their face before applying. If too drying, patient may add a non-comedogenic moisturizer. The patient verbalized understanding of the proper use and possible adverse effects of retinoids. All of the patient's questions and concerns were addressed. Topical Clindamycin Counseling: Patient counseled that this medication may cause skin irritation or allergic reactions. In the event of skin irritation, the patient was advised to reduce the amount of the drug applied or use it less frequently. The patient verbalized understanding of the proper use and possible adverse effects of clindamycin. All of the patient's questions and concerns were addressed. High Dose Vitamin A Pregnancy And Lactation Text: High dose vitamin A therapy is contraindicated during pregnancy and breast feeding. Azithromycin Counseling:  I discussed with the patient the risks of azithromycin including but not limited to GI upset, allergic reaction, drug rash, diarrhea, and yeast infections. Winlevi Counseling:  I discussed with the patient the risks of topical clascoterone including but not limited to erythema, scaling, itching, and stinging. Patient voiced their understanding. Sarecycline Pregnancy And Lactation Text: This medication is Pregnancy Category D and not consider safe during pregnancy. It is also excreted in breast milk. Birth Control Pills Counseling: Birth Control Pill Counseling: I discussed with the patient the potential side effects of OCPs including but not limited to increased risk of stroke, heart attack, thrombophlebitis, deep venous thrombosis, hepatic adenomas, breast changes, GI upset, headaches, and depression. The patient verbalized understanding of the proper use and possible adverse effects of OCPs. All of the patient's questions and concerns were addressed. Azelaic Acid Pregnancy And Lactation Text: This medication is considered safe during pregnancy and breast feeding. Topical Retinoid Pregnancy And Lactation Text: This medication is Pregnancy Category C. It is unknown if this medication is excreted in breast milk. Dapsone Counseling: I discussed with the patient the risks of dapsone including but not limited to hemolytic anemia, agranulocytosis, rashes, methemoglobinemia, kidney failure, peripheral neuropathy, headaches, GI upset, and liver toxicity. Patients who start dapsone require monitoring including baseline LFTs and weekly CBCs for the first month, then every month thereafter. The patient verbalized understanding of the proper use and possible adverse effects of dapsone. All of the patient's questions and concerns were addressed. Detail Level: Zone Erythromycin Counseling:  I discussed with the patient the risks of erythromycin including but not limited to GI upset, allergic reaction, drug rash, diarrhea, increase in liver enzymes, and yeast infections. Tazorac Pregnancy And Lactation Text: This medication is not safe during pregnancy. It is unknown if this medication is excreted in breast milk. High Dose Vitamin A Counseling: Side effects reviewed, pt to contact office should one occur. Sarecycline Counseling: Patient advised regarding possible photosensitivity and discoloration of the teeth, skin, lips, tongue and gums. Patient instructed to avoid sunlight, if possible. When exposed to sunlight, patients should wear protective clothing, sunglasses, and sunscreen. The patient was instructed to call the office immediately if the following severe adverse effects occur:  hearing changes, easy bruising/bleeding, severe headache, or vision changes. The patient verbalized understanding of the proper use and possible adverse effects of sarecycline. All of the patient's questions and concerns were addressed. Topical Sulfur Applications Pregnancy And Lactation Text: This medication is Pregnancy Category C and has an unknown safety profile during pregnancy. It is unknown if this topical medication is excreted in breast milk. Tetracycline Counseling: Patient counseled regarding possible photosensitivity and increased risk for sunburn. Patient instructed to avoid sunlight, if possible. When exposed to sunlight, patients should wear protective clothing, sunglasses, and sunscreen. The patient was instructed to call the office immediately if the following severe adverse effects occur:  hearing changes, easy bruising/bleeding, severe headache, or vision changes. The patient verbalized understanding of the proper use and possible adverse effects of tetracycline. All of the patient's questions and concerns were addressed. Patient understands to avoid pregnancy while on therapy due to potential birth defects. Use Enhanced Medication Counseling?: Yes Bactrim Pregnancy And Lactation Text: This medication is Pregnancy Category D and is known to cause fetal risk. It is also excreted in breast milk. Aklief counseling:  Patient advised to apply a pea-sized amount only at bedtime and wait 30 minutes after washing their face before applying. If too drying, patient may add a non-comedogenic moisturizer. The most commonly reported side effects including irritation, redness, scaling, dryness, stinging, burning, itching, and increased risk of sunburn. The patient verbalized understanding of the proper use and possible adverse effects of retinoids. All of the patient's questions and concerns were addressed. Benzoyl Peroxide Counseling: Patient counseled that medicine may cause skin irritation and bleach clothing. In the event of skin irritation, the patient was advised to reduce the amount of the drug applied or use it less frequently. The patient verbalized understanding of the proper use and possible adverse effects of benzoyl peroxide. All of the patient's questions and concerns were addressed. Doxycycline Pregnancy And Lactation Text: This medication is Pregnancy Category D and not consider safe during pregnancy. It is also excreted in breast milk but is considered safe for shorter treatment courses. Isotretinoin Pregnancy And Lactation Text: This medication is Pregnancy Category X and is considered extremely dangerous during pregnancy. It is unknown if it is excreted in breast milk. Tazorac Counseling:  Patient advised that medication is irritating and drying. Patient may need to apply sparingly and wash off after an hour before eventually leaving it on overnight. The patient verbalized understanding of the proper use and possible adverse effects of tazorac. All of the patient's questions and concerns were addressed. Topical Sulfur Applications Counseling: Topical Sulfur Counseling: Patient counseled that this medication may cause skin irritation or allergic reactions. In the event of skin irritation, the patient was advised to reduce the amount of the drug applied or use it less frequently. The patient verbalized understanding of the proper use and possible adverse effects of topical sulfur application. All of the patient's questions and concerns were addressed. Bactrim Counseling:  I discussed with the patient the risks of sulfa antibiotics including but not limited to GI upset, allergic reaction, drug rash, diarrhea, dizziness, photosensitivity, and yeast infections. Rarely, more serious reactions can occur including but not limited to aplastic anemia, agranulocytosis, methemoglobinemia, blood dyscrasias, liver or kidney failure, lung infiltrates or desquamative/blistering drug rashes. Spironolactone Pregnancy And Lactation Text: This medication can cause feminization of the male fetus and should be avoided during pregnancy. The active metabolite is also found in breast milk. Include Pregnancy/Lactation Warning?: No Aklief Pregnancy And Lactation Text: It is unknown if this medication is safe to use during pregnancy. It is unknown if this medication is excreted in breast milk. Breastfeeding women should use the topical cream on the smallest area of the skin for the shortest time needed while breastfeeding. Do not apply to nipple and areola. Benzoyl Peroxide Pregnancy And Lactation Text: This medication is Pregnancy Category C. It is unknown if benzoyl peroxide is excreted in breast milk. Yes

## 2024-08-28 NOTE — PROGRESS NOTE ADULT - ASSESSMENT
Problem List:  1) Hyponatremia, possibly low solute or from lexapro     -128-129-131 (DDAVP)- 122-124  Urine output is increasing again, will give another 2mcg of DDAVP IVP x1 to slow urine output and keep her on the right NA trajectory   Check NA again in 4-6 hours  low urine osm, low specific gravity and low urine Na indicate low solute diet, patient encouraged to increase salt intake  potassium 40meq PO given x1

## 2024-08-28 NOTE — PATIENT PROFILE ADULT - FALL HARM RISK - FACTORS
Dizziness/Frequent toileting needed/Impaired gait/Impaired vision/IV and/or equipment tethered to patient/Other medical problems/Poor balance/Weakness/Other

## 2024-08-29 LAB
ANION GAP SERPL CALC-SCNC: 7 MMOL/L — SIGNIFICANT CHANGE UP (ref 5–17)
ANION GAP SERPL CALC-SCNC: 8 MMOL/L — SIGNIFICANT CHANGE UP (ref 5–17)
ANION GAP SERPL CALC-SCNC: 9 MMOL/L — SIGNIFICANT CHANGE UP (ref 5–17)
BUN SERPL-MCNC: 5 MG/DL — LOW (ref 7–23)
BUN SERPL-MCNC: 5 MG/DL — LOW (ref 7–23)
BUN SERPL-MCNC: 6 MG/DL — LOW (ref 7–23)
CALCIUM SERPL-MCNC: 8.2 MG/DL — LOW (ref 8.5–10.1)
CALCIUM SERPL-MCNC: 8.4 MG/DL — LOW (ref 8.5–10.1)
CALCIUM SERPL-MCNC: 8.8 MG/DL — SIGNIFICANT CHANGE UP (ref 8.5–10.1)
CHLORIDE SERPL-SCNC: 90 MMOL/L — LOW (ref 96–108)
CHLORIDE SERPL-SCNC: 90 MMOL/L — LOW (ref 96–108)
CHLORIDE SERPL-SCNC: 92 MMOL/L — LOW (ref 96–108)
CO2 SERPL-SCNC: 22 MMOL/L — SIGNIFICANT CHANGE UP (ref 22–31)
CO2 SERPL-SCNC: 22 MMOL/L — SIGNIFICANT CHANGE UP (ref 22–31)
CO2 SERPL-SCNC: 23 MMOL/L — SIGNIFICANT CHANGE UP (ref 22–31)
CORTIS AM PEAK SERPL-MCNC: 24.4 UG/DL — HIGH (ref 6–18.4)
CREAT SERPL-MCNC: 0.6 MG/DL — SIGNIFICANT CHANGE UP (ref 0.5–1.3)
CREAT SERPL-MCNC: 0.66 MG/DL — SIGNIFICANT CHANGE UP (ref 0.5–1.3)
CREAT SERPL-MCNC: 0.71 MG/DL — SIGNIFICANT CHANGE UP (ref 0.5–1.3)
EGFR: 84 ML/MIN/1.73M2 — SIGNIFICANT CHANGE UP
EGFR: 87 ML/MIN/1.73M2 — SIGNIFICANT CHANGE UP
EGFR: 89 ML/MIN/1.73M2 — SIGNIFICANT CHANGE UP
GLUCOSE SERPL-MCNC: 112 MG/DL — HIGH (ref 70–99)
GLUCOSE SERPL-MCNC: 91 MG/DL — SIGNIFICANT CHANGE UP (ref 70–99)
GLUCOSE SERPL-MCNC: 98 MG/DL — SIGNIFICANT CHANGE UP (ref 70–99)
HCT VFR BLD CALC: 36.7 % — SIGNIFICANT CHANGE UP (ref 34.5–45)
HGB BLD-MCNC: 13 G/DL — SIGNIFICANT CHANGE UP (ref 11.5–15.5)
MCHC RBC-ENTMCNC: 30 PG — SIGNIFICANT CHANGE UP (ref 27–34)
MCHC RBC-ENTMCNC: 35.4 GM/DL — SIGNIFICANT CHANGE UP (ref 32–36)
MCV RBC AUTO: 84.6 FL — SIGNIFICANT CHANGE UP (ref 80–100)
PLATELET # BLD AUTO: 354 K/UL — SIGNIFICANT CHANGE UP (ref 150–400)
POTASSIUM SERPL-MCNC: 3.6 MMOL/L — SIGNIFICANT CHANGE UP (ref 3.5–5.3)
POTASSIUM SERPL-MCNC: 3.7 MMOL/L — SIGNIFICANT CHANGE UP (ref 3.5–5.3)
POTASSIUM SERPL-MCNC: 3.7 MMOL/L — SIGNIFICANT CHANGE UP (ref 3.5–5.3)
POTASSIUM SERPL-SCNC: 3.6 MMOL/L — SIGNIFICANT CHANGE UP (ref 3.5–5.3)
POTASSIUM SERPL-SCNC: 3.7 MMOL/L — SIGNIFICANT CHANGE UP (ref 3.5–5.3)
POTASSIUM SERPL-SCNC: 3.7 MMOL/L — SIGNIFICANT CHANGE UP (ref 3.5–5.3)
RBC # BLD: 4.34 M/UL — SIGNIFICANT CHANGE UP (ref 3.8–5.2)
RBC # FLD: 12.2 % — SIGNIFICANT CHANGE UP (ref 10.3–14.5)
SODIUM SERPL-SCNC: 120 MMOL/L — CRITICAL LOW (ref 135–145)
SODIUM SERPL-SCNC: 121 MMOL/L — LOW (ref 135–145)
SODIUM SERPL-SCNC: 122 MMOL/L — LOW (ref 135–145)
WBC # BLD: 12.73 K/UL — HIGH (ref 3.8–10.5)
WBC # FLD AUTO: 12.73 K/UL — HIGH (ref 3.8–10.5)

## 2024-08-29 PROCEDURE — 93010 ELECTROCARDIOGRAM REPORT: CPT

## 2024-08-29 PROCEDURE — 90792 PSYCH DIAG EVAL W/MED SRVCS: CPT

## 2024-08-29 PROCEDURE — 99233 SBSQ HOSP IP/OBS HIGH 50: CPT

## 2024-08-29 RX ORDER — PANTOPRAZOLE SODIUM 40 MG
40 TABLET, DELAYED RELEASE (ENTERIC COATED) ORAL
Refills: 0 | Status: DISCONTINUED | OUTPATIENT
Start: 2024-08-29 | End: 2024-09-04

## 2024-08-29 RX ORDER — MIRTAZAPINE 30 MG
3.75 TABLET ORAL AT BEDTIME
Refills: 0 | Status: DISCONTINUED | OUTPATIENT
Start: 2024-08-29 | End: 2024-09-04

## 2024-08-29 RX ORDER — DEXTROSE, SODIUM ACETATE, POTASSIUM CHLORIDE, POTASSIUM PHOSPHATE, AND SODIUM CHLORIDE 5; .15; .13; .28; .091 G/100ML; G/100ML; G/100ML; G/100ML; G/100ML
1000 INJECTION, SOLUTION INTRAVENOUS
Refills: 0 | Status: DISCONTINUED | OUTPATIENT
Start: 2024-08-29 | End: 2024-08-30

## 2024-08-29 RX ORDER — MIRTAZAPINE 30 MG
7.5 TABLET ORAL AT BEDTIME
Refills: 0 | Status: DISCONTINUED | OUTPATIENT
Start: 2024-08-29 | End: 2024-08-29

## 2024-08-29 RX ORDER — ACETAMINOPHEN 325 MG/1
1000 TABLET ORAL ONCE
Refills: 0 | Status: COMPLETED | OUTPATIENT
Start: 2024-08-29 | End: 2024-08-29

## 2024-08-29 RX ADMIN — PIPERACILLIN SODIUM AND TAZOBACTAM SODIUM 25 GRAM(S): 3; .375 INJECTION, POWDER, FOR SOLUTION INTRAVENOUS at 13:42

## 2024-08-29 RX ADMIN — ONDANSETRON 4 MILLIGRAM(S): 2 INJECTION, SOLUTION INTRAMUSCULAR; INTRAVENOUS at 05:40

## 2024-08-29 RX ADMIN — PIPERACILLIN SODIUM AND TAZOBACTAM SODIUM 25 GRAM(S): 3; .375 INJECTION, POWDER, FOR SOLUTION INTRAVENOUS at 05:40

## 2024-08-29 RX ADMIN — AMLODIPINE BESYLATE 5 MILLIGRAM(S): 10 TABLET ORAL at 10:38

## 2024-08-29 RX ADMIN — DESMOPRESSIN ACETATE 2 MICROGRAM(S): 4 INJECTION, SOLUTION INTRAVENOUS; SUBCUTANEOUS at 00:39

## 2024-08-29 RX ADMIN — ACETAMINOPHEN 400 MILLIGRAM(S): 325 TABLET ORAL at 05:40

## 2024-08-29 RX ADMIN — Medication 3.75 MILLIGRAM(S): at 21:33

## 2024-08-29 RX ADMIN — ENOXAPARIN SODIUM 40 MILLIGRAM(S): 100 INJECTION SUBCUTANEOUS at 10:38

## 2024-08-29 RX ADMIN — DEXTROSE, SODIUM ACETATE, POTASSIUM CHLORIDE, POTASSIUM PHOSPHATE, AND SODIUM CHLORIDE 50 MILLILITER(S): 5; .15; .13; .28; .091 INJECTION, SOLUTION INTRAVENOUS at 16:15

## 2024-08-29 RX ADMIN — Medication 40 MILLIGRAM(S): at 10:37

## 2024-08-29 RX ADMIN — ROSUVASTATIN CALCIUM 5 MILLIGRAM(S): 10 TABLET ORAL at 21:33

## 2024-08-29 RX ADMIN — PIPERACILLIN SODIUM AND TAZOBACTAM SODIUM 25 GRAM(S): 3; .375 INJECTION, POWDER, FOR SOLUTION INTRAVENOUS at 21:42

## 2024-08-29 RX ADMIN — Medication 40 MILLIEQUIVALENT(S): at 00:38

## 2024-08-29 NOTE — DIETITIAN INITIAL EVALUATION ADULT - NSFNSGIIOFT_GEN_A_CORE
I&O's Detail    28 Aug 2024 07:01  -  29 Aug 2024 07:00  --------------------------------------------------------  IN:    dextrose 5%: 522 mL    IV PiggyBack: 800 mL    Oral Fluid: 425 mL  Total IN: 1747 mL    OUT:    Indwelling Catheter - Urethral (mL): 427 mL    Voided (mL): 850 mL  Total OUT: 1277 mL    Total NET: 470 mL

## 2024-08-29 NOTE — PHYSICAL THERAPY INITIAL EVALUATION ADULT - PERTINENT HX OF CURRENT PROBLEM, REHAB EVAL
presented  to ER with low sodium levels that were reported from her GI doctor based on blood work drawn at appointment  and was advised to bring patient to ED   She has been  c/o general malaise and fatigue with nausea and vomiting  for weeks .  In the ER she was confused  she had leukocytosis with WBC 19  possible UTI  sodium 115 on admission. pt expressing depressive thoughts, psych consulted.

## 2024-08-29 NOTE — SWALLOW BEDSIDE ASSESSMENT ADULT - SWALLOW EVAL: RECOMMENDED DIET
THEIR ARE NO OVERT OROPHARYNGEAL SWALLOWING CONTRAINDICATIONS EVIDENT FOR PATIENT TO BE ON A REGULAR CONSISTENCY DIET WITH THIN LIQUIDS AS PATIENT TOLERATES THE SAME FROM AN OROPHARYNGEAL SWALLOWING PERSPECTIVE ON EXAM.

## 2024-08-29 NOTE — DIETITIAN INITIAL EVALUATION ADULT - ORAL INTAKE PTA/DIET HISTORY
Unable to obtain intake/ diet hx pta 2/2 pt lethargic but arousable, however falling asleep during interview. Per Nephrology note "Pt's son reports pt has been feeling poorly and weak and had no po intake for several days."

## 2024-08-29 NOTE — BH CONSULTATION LIAISON ASSESSMENT NOTE - NSBHCHARTREVIEWVS_PSY_A_CORE FT
Vital Signs Last 24 Hrs  T(C): 36.5 (29 Aug 2024 13:00), Max: 36.9 (28 Aug 2024 16:00)  T(F): 97.7 (29 Aug 2024 13:00), Max: 98.4 (28 Aug 2024 16:00)  HR: 68 (29 Aug 2024 13:00) (50 - 94)  BP: 130/67 (29 Aug 2024 13:00) (94/48 - 171/78)  BP(mean): 75 (29 Aug 2024 13:00) (62 - 124)  RR: 15 (29 Aug 2024 13:00) (15 - 26)  SpO2: 100% (29 Aug 2024 13:00) (88% - 100%)    Parameters below as of 29 Aug 2024 12:00  Patient On (Oxygen Delivery Method): room air

## 2024-08-29 NOTE — DIETITIAN INITIAL EVALUATION ADULT - ADD RECOMMEND
1) Advance diet to Regular as soon as medically feasible  2) Add ensure plus high protein BID to optimize PO intake (provides 350 kcal, 20g protein/ shake) when diet is advanced  3) Obtain vitamin D 25OH level to assess nutriture  4) Please obtain daily weights  5) Consider adding thiamine 200 mg daily 2/2 poor PO intake/ malnutrition   6) MVI w/ minerals daily to ensure 100% RDA met  7) Encourage protein-rich foods, maximize food preferences  8) Monitor bowel movements, if no BM for >3 days, consider implementing bowel regimen.  9) C/w Zofran PRN  10) Monitor closely for refeeding syndrome - please obtain BMP, Mg, and Phos levels. Monitor and replete K, Phos, Mg prn if begins to downtrend. If nutrition support is initiated, recommend to check refeeding labs BID x 2-3 days upon initiation and then will reevaluate. Consider KPhos suppl BID in effort to supplement low lytes prior to initiating nutrition support.   11) If pt remains lethargic or with poor po intake, recommend placing corpak + bridle and initiating EN to bridge PO intake to meet 100% of ENN.  12) Confirm goals of care regarding nutrition support  RD will continue to monitor PO intake, labs, hydration, and wt prn.

## 2024-08-29 NOTE — PROGRESS NOTE ADULT - ASSESSMENT
84 year old female admitted to ICU for hyponatremia and altered mental status   had sodium to 125 in Febuary felt secondary to  HCTZ was seen by nephrology at that [facility]  Altered Mental Status   Hyponatremia   UTI (possible)  HTN         1. Hyponatremia - Patient was on lexapro , will  hold, correction too quickly now stable at 122(7 in 24 hours)       next sodium at 3 pm.   2. UTI was started on Zosyn  culture [id number] Gram negative rods  3. nausea, ? related to hyponatremia,  PPI, monitor diet as tolerated, had endoscopy in recent past for nausea, abdominal ct was negative      will consult GI  4. Lovenox - DVT prophylaxis  5.  depression will ask psych to see    can transfer to floor

## 2024-08-29 NOTE — CHART NOTE - NSCHARTNOTEFT_GEN_A_CORE
BMP resulted tonight Na 121 from 120 earlier and K 3.6. Spoke with nurse Moni, pt is currently getting NS with KCL 20 mEQ, ok to continue IVF, check labs in am. BMP resulted tonight Na 121 from 120 earlier and K 3.6. Spoke with nurse Moni, pt is currently getting NS with KCL 20 mEQ at 50 ml/hr, ok to continue IV Fluids, monitor labs in am.

## 2024-08-29 NOTE — DIETITIAN INITIAL EVALUATION ADULT - PERTINENT MEDS FT
MEDICATIONS  (STANDING):  amLODIPine   Tablet 5 milliGRAM(s) Oral daily  enoxaparin Injectable 40 milliGRAM(s) SubCutaneous every 24 hours  pantoprazole    Tablet 40 milliGRAM(s) Oral before breakfast  piperacillin/tazobactam IVPB.. 3.375 Gram(s) IV Intermittent every 8 hours  rosuvastatin 5 milliGRAM(s) Oral at bedtime    MEDICATIONS  (PRN):  meclizine 12.5 milliGRAM(s) Oral two times a day PRN for dizziness  ondansetron Injectable 4 milliGRAM(s) IV Push every 8 hours PRN Nausea and/or Vomiting    Home Medications:  Colazal 750 mg oral capsule: 2 cap(s) orally 2 times a day DC&#x27;d by pt&#x27;s GI 2 days ago to prevent extra dizziness/nausea (27 Aug 2024 23:25)  Crestor 5 mg oral tablet: 1 tab(s) orally once a day (27 Aug 2024 23:11)  escitalopram 5 mg oral tablet: 1 tab(s) orally once a day for chronic dizziness (27 Aug 2024 23:13)  Humira 40 mg/0.8 mL subcutaneous kit: 40 milligram(s) subcutaneously every 2 weeks (27 Aug 2024 23:11)  metoclopramide 5 mg oral tablet: 1 tab(s) orally 1 to 3 times a day as needed for  nausea (27 Aug 2024 23:26)  NIFEdipine 30 mg oral tablet, extended release: 1 tab(s) orally once a day (27 Aug 2024 23:10)  pantoprazole 40 mg oral delayed release tablet: 1 tab(s) orally 2 times a day (27 Aug 2024 23:07)

## 2024-08-29 NOTE — SWALLOW BEDSIDE ASSESSMENT ADULT - COMMENTS
The pt was admitted to  with reduced PO intake and finding of low sodium while she was at her physician's office. Hospital course is remarkable for c/o nausea and hyponatremia. This profile is superimposed upon a history of Crohn's Disease, IBS, GERD, OP, depression, "memory deficits", Somatoform Disorder NOS and tonsillectomy.

## 2024-08-29 NOTE — DIETITIAN INITIAL EVALUATION ADULT - OTHER INFO
85 y/o F with a PMHx of memory disorder, undifferentiated somatoform disorder, Crohn's disease, osteoporosis, MDD, IBD, GERD s/p recent EGD presented to ER with low sodium levels that were reported from her GI doctor based on blood work drawn at appointment and was advised to bring patient to ED. She has been  c/o general malaise and fatigue with nausea and vomiting for weeks per the notes. Admitted for hyponatremia and altered mental status; tx to ICU.    Unable to meaningful information 2/2 pt lethargic but arousable, however falling asleep during interview. Pt has NPO since admit (x2 days); now with nausea and has been started on Zofran. RD obtained bedscale wt on 8/29 - 127#; 2+ edema may be skewing weight. No weight hx to review. Pt thin, frail appearing. NFPE reveals moderate to severe muscle/ fat wasting, pt meets criteria for PCM at this time. Recommend to advance diet to Regular as soon as medically feasible. Will trial Ensure Plus High Protein BID in effort to optimize PO intake when diet is advanced. If pt remains lethargic or with poor po intake, recommend placing corpak + bridle and initiating EN to bridge PO intake to meet 100% of ENN. Monitor closely for refeeding syndrome - please obtain BMP, Mg, and Phos levels. Monitor and replete K, Phos, Mg prn if begins to downtrend. If nutrition support is initiated, recommend to check refeeding labs BID x 2-3 days upon initiation and then will reevaluate. Consider KPhos suppl BID in effort to supplement low lytes prior to initiating nutrition support. Consider adding 200mg of thiamine and MVI w/ minerals daily. Please see additional recommendations below.

## 2024-08-29 NOTE — BH CONSULTATION LIAISON ASSESSMENT NOTE - CURRENT MEDICATION
MEDICATIONS  (STANDING):  amLODIPine   Tablet 5 milliGRAM(s) Oral daily  enoxaparin Injectable 40 milliGRAM(s) SubCutaneous every 24 hours  mirtazapine Soltab 7.5 milliGRAM(s) Oral at bedtime  pantoprazole    Tablet 40 milliGRAM(s) Oral before breakfast  piperacillin/tazobactam IVPB.. 3.375 Gram(s) IV Intermittent every 8 hours  rosuvastatin 5 milliGRAM(s) Oral at bedtime    MEDICATIONS  (PRN):  meclizine 12.5 milliGRAM(s) Oral two times a day PRN for dizziness  ondansetron Injectable 4 milliGRAM(s) IV Push every 8 hours PRN Nausea and/or Vomiting

## 2024-08-29 NOTE — BH CONSULTATION LIAISON ASSESSMENT NOTE - NSBHCHARTREVIEWLAB_PSY_A_CORE FT
13.0   12.73 )-----------( 354      ( 29 Aug 2024 05:35 )             36.7   08-29    122<L>  |  92<L>  |  5<L>  ----------------------------<  91  3.7   |  22  |  0.66    Ca    8.2<L>      29 Aug 2024 05:35  Mg     2.5     08-28    TPro  7.1  /  Alb  3.3  /  TBili  0.6  /  DBili  x   /  AST  12<L>  /  ALT  13  /  AlkPhos  97  08-28

## 2024-08-29 NOTE — BH CONSULTATION LIAISON ASSESSMENT NOTE - NSICDXPASTMEDICALHX_GEN_ALL_CORE_FT
PAST MEDICAL HISTORY:  Esophageal reflux     H/O Crohn's disease     IBD (inflammatory bowel disease)     Major depressive disorder     Memory disorder     Osteoporosis     Undifferentiated somatoform disorder

## 2024-08-29 NOTE — DIETITIAN INITIAL EVALUATION ADULT - ETIOLOGY
r/t decreased ability to meet increased nutrient needs 2/2 AMS, generalized weakness, poor appetite, wound healing, advanced age

## 2024-08-29 NOTE — BH CONSULTATION LIAISON ASSESSMENT NOTE - RISK ASSESSMENT
Low acute risk: Patient does not have active thoughts about killing herself.  She has passive thoughts about dying as a part of her overall clinical presentation due to medical condition.    Increased long-term risk considering her medical frailness, major changes in life and major losses including possibility that she may be going to nursing home as per son.    Protective factors: Family supportive.    Mitigation of risk: Medication and supportive approach.

## 2024-08-29 NOTE — SWALLOW BEDSIDE ASSESSMENT ADULT - SLP GENERAL OBSERVATIONS
On encounter, an area of ecchymosis was noted on the left side of pt's forehead. The pt was alert and interactive. However, her mood was somber, she was variably anxious and the pt tended to become irritable at times. The pt was able to verbalize during communicative probes w/o evidence of a sandi primary motor speech or primary linguistic pathology. Her utterances often reflected displeasure. The pt was able to verbalize her needs and is reportedly at speech-language baseline.

## 2024-08-29 NOTE — SWALLOW BEDSIDE ASSESSMENT ADULT - NS SPL SWALLOW CLINIC TRIAL FT
The patient repeatedly stated that she is nauseas and frequently stated that " I don't want to eat". On exam, the pt demonstrated periodic psychogenically reduced willingness to accept PO atop Oropharyngeal Swallowing integrity which subjectively appeared to be within functional parameters for age. Bolus formation/transfer were mechanically functional for age, swallow was triggered in an acceptable time frame for age and laryngeal lift on palpation during swallowing trials was felt to be functional for age as well. No behavioral aspiration signs exhibited. No change in O2 sats noted. Odynophagia denied. Note that while pt may actually feel nauseas, I suspect that their is a psychogenic component to her food refusal that is mood related.

## 2024-08-29 NOTE — BH CONSULTATION LIAISON ASSESSMENT NOTE - SUMMARY
Patient is 84 years old white  woman (  1 year ago), remote medical condition and emotional response of depression due to not getting better then having somatic feeling of pain in throat and difficulty swallowing.    Patient needs prompting to communicate with, she lacks motivation and unwillingly answer question, shows no interest in the moment or provider, she is depressed and anxious, with history of panic disorder since age 41 history of being Klonopin but not other antidepressants, parapneumonic.  Is of hopelessness and passive wishes to die but never plan or intent to take her own life.  Patient denies hearing voices and she is not paranoid.    However most information about her history obtained from her son at the bedside because patient is answering questions with an effort and everything seems to be effort.    Her son Dougie states that he recently moved into patient's house.  That she is seeing Dr. Mendez for "some dementia".   That she is depressed and that the stressor was 2 weeks ago when they discussed her going to nursing home.  After that patient started experiencing pain in the throat and not eating anymore.  Son also reports that the patient's mother  from starving.  He described it as intentionally starving that his grandmother stopped eating and  from it.  He also states that this patient told him that her thoughts she wants to go the way her mother went.   As per son her complaints about swallowing and pain in throat somatic response to stressors.  However gastroenterologist told him that she has GERD.  Son also reports that patient started with dizziness and vertigo 2 weeks ago and things were spinning around her that continue to be dizziness and pain in is oesophageal  area.    Plan:    Medication discussed.  Patient is depressed but he is adjusting to major changes in life as well as medical conditions.  Suggest to use medication without GI side effects that stimulate appetite as a side effect and to come in orally dissolvable form.  Suggested Victoza being orally dissolvable 7.5 mg at bedtime and some and patient agree.    Son is asking for patient to get abdominal pain.  Educated about dependence from benzodiazepines and that patient is at high risk to fall would avoid benzos.   Educated patient and son that if nausea continues to be so pervasive we can add Zyprexa low-dose.     Patient is 84 years old white  woman (  1 year ago), remote medical condition and emotional response of depression due to not getting better then having somatic feeling of pain in throat and difficulty swallowing.    Patient needs prompting to communicate with, she lacks motivation and unwillingly answer question, shows no interest in the moment or provider, she is depressed and anxious, with history of panic disorder since age 41 history of being Klonopin but not other antidepressants, parapneumonic.  Is of hopelessness and passive wishes to die but never plan or intent to take her own life.  Patient denies hearing voices and she is not paranoid.    However most information about her history obtained from her son at the bedside because patient is answering questions with an effort and everything seems to be effort.    Her son Dougie states that he recently moved into patient's house.  That she is seeing Dr. Mendez for "some dementia".   That she is depressed and that the stressor was 2 weeks ago when they discussed her going to nursing home.  After that patient started experiencing pain in the throat and not eating anymore.  Son also reports that the patient's mother  from starving.  He described it as intentionally starving that his grandmother stopped eating and  from it.  He also states that this patient told him that her thoughts she wants to go the way her mother went.   As per son her complaints about swallowing and pain in throat somatic response to stressors.  However gastroenterologist told him that she has GERD.  Son also reports that patient started with dizziness and vertigo 2 weeks ago and things were spinning around her that continue to be dizziness and pain in is oesophageal  area.    Plan:    Medication discussed.  Patient is depressed but he is adjusting to major changes in life as well as medical conditions.  Suggest to use medication without GI side effects that stimulate appetite as a side effect and to come in orally dissolvable form.  Suggested mirtazapine  being orally dissolvable 7.5 mg at bedtime and  patient agree.    Son is asking for patient to get abdominal pain.  Educated about dependence from benzodiazepines and that patient is at high risk to fall would avoid benzos.   Educated patient and son that if nausea continues to be so pervasive we can add Zyprexa low-dose.

## 2024-08-29 NOTE — SWALLOW BEDSIDE ASSESSMENT ADULT - SWALLOW EVAL: CRITERIA FOR SKILLED INTERVENTION MET
DO NOT FEEL THAT ACUTE SPEECH PATHOLOGY FOLLOW UP WOULD CHANGE CLINICAL MANAGEMENT/OUTCOME IN HOSPITAL SETTING. PT'S SPEECH-LANGUAGE AND OROPHARYNGEAL SWALLOWING INTEGRITY ARE FUNCTIONAL/AT BASELINE/ARE FELT TO BE MAXIMIZED. ACUTE ST WOULD NOT BE OF BENEFIT. GIVEN ABOVE, THIS SERVICE WILL NOT ACTIVELY FOLLOW. RECONSULT PRN SHOULD STATUS CHANGE AND CONDITION WARRANT.

## 2024-08-29 NOTE — PHYSICAL THERAPY INITIAL EVALUATION ADULT - ADDITIONAL COMMENTS
pt not answering questions, per son amb w/ [facility], house is 2 levels, has stairlift but performs stairs w/ [facility]

## 2024-08-29 NOTE — SWALLOW BEDSIDE ASSESSMENT ADULT - SWALLOW EVAL: PROGNOSIS
2) The pt repeatedly stated that she is nauseas and frequently stated that " I don't want to eat". On exam, the pt demonstrates periodic psychogenically reduced willingness to accept PO atop Oropharyngeal Swallowing integrity which subjectively appeared to be within functional parameters for age. Bolus formation/transfer were mechanically functional for age, swallow was triggered in an acceptable time frame for age and laryngeal lift on palpation during swallowing trials was felt to be functional for age as well. No behavioral aspiration signs exhibited. No change in O2 sats noted. Odynophagia denied. Note that while pt may actually feel nauseas, I suspect that their is a psychogenic component to her food refusal that is mood related.

## 2024-08-29 NOTE — BH CONSULTATION LIAISON ASSESSMENT NOTE - NSBHCONSULTFOLLOWAFTERCARE_PSY_A_CORE FT
Patient can be referred to outside provider.  Patient resides in [place] and social work can refer to private psychiatrist.  [facility]  If disposition is to go to subacute rehab or to nursing home she can resume psychiatric treatment in those facilities.

## 2024-08-29 NOTE — BH CONSULTATION LIAISON ASSESSMENT NOTE - ORIENTATION
Pt knows her name, , but not age. She knows her son at bedside, and that's he has 4 grandchildren, but could not name them.

## 2024-08-29 NOTE — DIETITIAN INITIAL EVALUATION ADULT - FACTORS AFF FOOD INTAKE
generalized weakness/ malaise/persistent lack of appetite/persistent nausea/vomiting/other (specify)/NPO/unable to determine

## 2024-08-29 NOTE — BH CONSULTATION LIAISON ASSESSMENT NOTE - HPI (INCLUDE ILLNESS QUALITY, SEVERITY, DURATION, TIMING, CONTEXT, MODIFYING FACTORS, ASSOCIATED SIGNS AND SYMPTOMS)
Patient is 84 years old white  woman (  1 year ago), remote medical condition and emotional response of depression due to not getting better then having somatic feeling of pain in throat and difficulty swallowing.    Patient needs prompting to communicate with, she lacks motivation and unwillingly answer question, shows no interest in the moment or provider, she is depressed and anxious, with history of panic disorder since age 41 history of being Klonopin but not other antidepressants, parapneumonic.  Is of hopelessness and passive wishes to die but never plan or intent to take her own life.  Patient denies hearing voices and she is not paranoid.    However most information about her history obtained from her son at the bedside because patient is answering questions with an effort and everything seems to be effort.    Her son Dougie states that he recently moved into patient's house.  That she is seeing Dr. Mendez for "some dementia".   That she is depressed and that the stressor was 2 weeks ago when they discussed her going to nursing home.  After that patient started experiencing pain in the throat and not eating anymore.  Son also reports that the patient's mother  from starving.  He described it as intentionally starving that his grandmother stopped eating and  from it.  He also states that this patient told him that her thoughts she wants to go the way her mother went.   As per son her complaints about swallowing and pain in throat somatic response to stressors.  However gastroenterologist told him that she has GERD.  Son also reports that patient started with dizziness and vertigo 2 weeks ago and things were spinning around her that continue to be dizziness and pain in is a oesophageal  area.

## 2024-08-29 NOTE — PROGRESS NOTE ADULT - ASSESSMENT
85 yo woman with Crohn's disease stable on Humira, with hx of short term memory disorder and IBD admitted with Hyponatremia.    --Hyponatremia : Urine study c/w depletion with low Na as well as Osm due to no solute intake for several days.    Correction of sodium noted and now post DdAVP to prevent rapid correction.    Follow up labs closely.  --GI evaluation for symptoms.  --UTI : continue Zosyn and follow symptoms  --Replete K.  D/w Dr Chavarria    8/29  Hyponatremia  Overcorrection halted w DDAVP, Na now 122  d/w Dr Chavarria, will monitor Na level to trend the direction  may start to correct spontaneously  + nausea, will need to be treated to allow pt to eat  Correct hypokalemia as needed

## 2024-08-29 NOTE — BH CONSULTATION LIAISON ASSESSMENT NOTE - NSBHCHARTREVIEWINVESTIGATE_PSY_A_CORE FT
Ventricular Rate 78 BPM    Atrial Rate 78 BPM    P-R Interval 196 ms    QRS Duration 78 ms    Q-T Interval 408 ms    QTC Calculation(Bazett) 465 ms    P Axis 32 degrees    R Axis 30 degrees    T Axis 5 degrees    Diagnosis Line Normal sinus rhythm  Nonspecific T wave abnormality  Abnormal ECG  Confirmed by Asim Lynn ([id number]) on 8/29/2024 8:31:25 AM

## 2024-08-29 NOTE — DIETITIAN INITIAL EVALUATION ADULT - PERTINENT LABORATORY DATA
08-29    122<L>  |  92<L>  |  5<L>  ----------------------------<  91  3.7   |  22  |  0.66    Ca    8.2<L>      29 Aug 2024 05:35  Mg     2.5     08-28    TPro  7.1  /  Alb  3.3  /  TBili  0.6  /  DBili  x   /  AST  12<L>  /  ALT  13  /  AlkPhos  97  08-28

## 2024-08-29 NOTE — PHYSICAL THERAPY INITIAL EVALUATION ADULT - MODALITIES TREATMENT COMMENTS
refusing oob/bed mobility, son not assisting to encourage pt participation, states pt is "stubborn". pt left supine in bed, lines, monitors intact.

## 2024-08-29 NOTE — PROGRESS NOTE ADULT - ASSESSMENT
84 year old female admitted to ICU for hyponatremia and altered mental status   had sodium to 125 in Febuary felt secondary to  HCTZ was seen by nephrology at that [facility]  Altered Mental Status   Hyponatremia   UTI (possible)  HTN         1. Hyponatremia - Patient was on lexapro , will  hold, correction too quickly now Na trending down 120       next sodium at  9 pm   2. UTI was started on Zosyn  culture [id number] Gram negative rods f/u cx   3. nausea, ? related to hyponatremia,  PPI, monitor diet as tolerated, had endoscopy in recent past for nausea, abdominal ct was negative      will consult GI  4. Lovenox - DVT prophylaxis  5.  depression - psych recommended  Victoza      nephro and  consults appreciated  84 year old female admitted to ICU for hyponatremia and altered mental status   had sodium to 125 in Febuary felt secondary to  HCTZ was seen by nephrology at that [facility]  Altered Mental Status   Hyponatremia   UTI (possible)  HTN         1. Hyponatremia - Patient was on lexapro , will  hold, correction too quickly now Na trending down 120 now start IVF normal saline 50cc/hr       next sodium at  9 pm   2. UTI was started on Zosyn  culture [id number] Gram negative rods f/u cx   3. nausea, ? related to hyponatremia,  PPI, monitor diet as tolerated, had endoscopy in recent past for nausea, abdominal ct was negative      will consult GI  4. Lovenox - DVT prophylaxis  5.  depression - psych recommended  Victoza      nephro and  consults appreciated

## 2024-08-29 NOTE — SWALLOW BEDSIDE ASSESSMENT ADULT - SWALLOW EVAL: DIAGNOSIS
1) On encounter, an area of ecchymosis was noted on the left side of pt's forehead. The pt was alert and interactive. However, her mood was somber, she was variably anxious and the pt tended to become irritable at times.  The pt was able to verbalize during communicative probes w/o evidence of a sandi primary motor speech or primary linguistic pathology. Her utterances often reflected displeasure. The pt was able to verbalize her needs and is reportedly at speech-language baseline.

## 2024-08-30 LAB
ALBUMIN SERPL ELPH-MCNC: 3 G/DL — LOW (ref 3.3–5)
ANION GAP SERPL CALC-SCNC: 4 MMOL/L — LOW (ref 5–17)
ANION GAP SERPL CALC-SCNC: 5 MMOL/L — SIGNIFICANT CHANGE UP (ref 5–17)
ANION GAP SERPL CALC-SCNC: 8 MMOL/L — SIGNIFICANT CHANGE UP (ref 5–17)
BASOPHILS # BLD AUTO: 0.09 K/UL — SIGNIFICANT CHANGE UP (ref 0–0.2)
BASOPHILS NFR BLD AUTO: 0.7 % — SIGNIFICANT CHANGE UP (ref 0–2)
BUN SERPL-MCNC: 10 MG/DL — SIGNIFICANT CHANGE UP (ref 7–23)
BUN SERPL-MCNC: 14 MG/DL — SIGNIFICANT CHANGE UP (ref 7–23)
BUN SERPL-MCNC: 4 MG/DL — LOW (ref 7–23)
CALCIUM SERPL-MCNC: 8.2 MG/DL — LOW (ref 8.5–10.1)
CALCIUM SERPL-MCNC: 9.1 MG/DL — SIGNIFICANT CHANGE UP (ref 8.5–10.1)
CALCIUM SERPL-MCNC: 9.2 MG/DL — SIGNIFICANT CHANGE UP (ref 8.5–10.1)
CHLORIDE SERPL-SCNC: 101 MMOL/L — SIGNIFICANT CHANGE UP (ref 96–108)
CHLORIDE SERPL-SCNC: 93 MMOL/L — LOW (ref 96–108)
CHLORIDE SERPL-SCNC: 99 MMOL/L — SIGNIFICANT CHANGE UP (ref 96–108)
CO2 SERPL-SCNC: 20 MMOL/L — LOW (ref 22–31)
CO2 SERPL-SCNC: 22 MMOL/L — SIGNIFICANT CHANGE UP (ref 22–31)
CO2 SERPL-SCNC: 22 MMOL/L — SIGNIFICANT CHANGE UP (ref 22–31)
CREAT SERPL-MCNC: 0.54 MG/DL — SIGNIFICANT CHANGE UP (ref 0.5–1.3)
CREAT SERPL-MCNC: 0.87 MG/DL — SIGNIFICANT CHANGE UP (ref 0.5–1.3)
CREAT SERPL-MCNC: 0.88 MG/DL — SIGNIFICANT CHANGE UP (ref 0.5–1.3)
EGFR: 65 ML/MIN/1.73M2 — SIGNIFICANT CHANGE UP
EGFR: 66 ML/MIN/1.73M2 — SIGNIFICANT CHANGE UP
EGFR: 91 ML/MIN/1.73M2 — SIGNIFICANT CHANGE UP
EOSINOPHIL # BLD AUTO: 0.28 K/UL — SIGNIFICANT CHANGE UP (ref 0–0.5)
EOSINOPHIL NFR BLD AUTO: 2.2 % — SIGNIFICANT CHANGE UP (ref 0–6)
GLUCOSE SERPL-MCNC: 86 MG/DL — SIGNIFICANT CHANGE UP (ref 70–99)
GLUCOSE SERPL-MCNC: 93 MG/DL — SIGNIFICANT CHANGE UP (ref 70–99)
GLUCOSE SERPL-MCNC: 99 MG/DL — SIGNIFICANT CHANGE UP (ref 70–99)
HCT VFR BLD CALC: 35.3 % — SIGNIFICANT CHANGE UP (ref 34.5–45)
HGB BLD-MCNC: 12.7 G/DL — SIGNIFICANT CHANGE UP (ref 11.5–15.5)
IMM GRANULOCYTES NFR BLD AUTO: 0.9 % — SIGNIFICANT CHANGE UP (ref 0–0.9)
LYMPHOCYTES # BLD AUTO: 2.8 K/UL — SIGNIFICANT CHANGE UP (ref 1–3.3)
LYMPHOCYTES # BLD AUTO: 22.3 % — SIGNIFICANT CHANGE UP (ref 13–44)
MCHC RBC-ENTMCNC: 30 PG — SIGNIFICANT CHANGE UP (ref 27–34)
MCHC RBC-ENTMCNC: 36 GM/DL — SIGNIFICANT CHANGE UP (ref 32–36)
MCV RBC AUTO: 83.3 FL — SIGNIFICANT CHANGE UP (ref 80–100)
MONOCYTES # BLD AUTO: 0.82 K/UL — SIGNIFICANT CHANGE UP (ref 0–0.9)
MONOCYTES NFR BLD AUTO: 6.5 % — SIGNIFICANT CHANGE UP (ref 2–14)
NEUTROPHILS # BLD AUTO: 8.46 K/UL — HIGH (ref 1.8–7.4)
NEUTROPHILS NFR BLD AUTO: 67.4 % — SIGNIFICANT CHANGE UP (ref 43–77)
PHOSPHATE SERPL-MCNC: 2.6 MG/DL — SIGNIFICANT CHANGE UP (ref 2.5–4.5)
PLATELET # BLD AUTO: 366 K/UL — SIGNIFICANT CHANGE UP (ref 150–400)
POTASSIUM SERPL-MCNC: 3.4 MMOL/L — LOW (ref 3.5–5.3)
POTASSIUM SERPL-MCNC: 4.7 MMOL/L — SIGNIFICANT CHANGE UP (ref 3.5–5.3)
POTASSIUM SERPL-MCNC: 5.3 MMOL/L — SIGNIFICANT CHANGE UP (ref 3.5–5.3)
POTASSIUM SERPL-SCNC: 3.4 MMOL/L — LOW (ref 3.5–5.3)
POTASSIUM SERPL-SCNC: 4.7 MMOL/L — SIGNIFICANT CHANGE UP (ref 3.5–5.3)
POTASSIUM SERPL-SCNC: 5.3 MMOL/L — SIGNIFICANT CHANGE UP (ref 3.5–5.3)
RBC # BLD: 4.24 M/UL — SIGNIFICANT CHANGE UP (ref 3.8–5.2)
RBC # FLD: 12.1 % — SIGNIFICANT CHANGE UP (ref 10.3–14.5)
SODIUM SERPL-SCNC: 121 MMOL/L — LOW (ref 135–145)
SODIUM SERPL-SCNC: 125 MMOL/L — LOW (ref 135–145)
SODIUM SERPL-SCNC: 128 MMOL/L — LOW (ref 135–145)
WBC # BLD: 12.56 K/UL — HIGH (ref 3.8–10.5)
WBC # FLD AUTO: 12.56 K/UL — HIGH (ref 3.8–10.5)

## 2024-08-30 PROCEDURE — 99233 SBSQ HOSP IP/OBS HIGH 50: CPT

## 2024-08-30 RX ORDER — POTASSIUM CHLORIDE 10 MEQ
40 TABLET, EXT RELEASE, PARTICLES/CRYSTALS ORAL ONCE
Refills: 0 | Status: COMPLETED | OUTPATIENT
Start: 2024-08-30 | End: 2024-08-30

## 2024-08-30 RX ADMIN — ONDANSETRON 4 MILLIGRAM(S): 2 INJECTION, SOLUTION INTRAMUSCULAR; INTRAVENOUS at 16:48

## 2024-08-30 RX ADMIN — Medication 3.75 MILLIGRAM(S): at 21:00

## 2024-08-30 RX ADMIN — Medication 40 MILLIEQUIVALENT(S): at 10:00

## 2024-08-30 RX ADMIN — ENOXAPARIN SODIUM 40 MILLIGRAM(S): 100 INJECTION SUBCUTANEOUS at 10:00

## 2024-08-30 RX ADMIN — PIPERACILLIN SODIUM AND TAZOBACTAM SODIUM 25 GRAM(S): 3; .375 INJECTION, POWDER, FOR SOLUTION INTRAVENOUS at 13:01

## 2024-08-30 RX ADMIN — PIPERACILLIN SODIUM AND TAZOBACTAM SODIUM 25 GRAM(S): 3; .375 INJECTION, POWDER, FOR SOLUTION INTRAVENOUS at 21:00

## 2024-08-30 RX ADMIN — PIPERACILLIN SODIUM AND TAZOBACTAM SODIUM 25 GRAM(S): 3; .375 INJECTION, POWDER, FOR SOLUTION INTRAVENOUS at 06:01

## 2024-08-30 RX ADMIN — ROSUVASTATIN CALCIUM 5 MILLIGRAM(S): 10 TABLET ORAL at 21:00

## 2024-08-30 RX ADMIN — Medication 40 MILLIGRAM(S): at 06:02

## 2024-08-30 RX ADMIN — DEXTROSE, SODIUM ACETATE, POTASSIUM CHLORIDE, POTASSIUM PHOSPHATE, AND SODIUM CHLORIDE 50 MILLILITER(S): 5; .15; .13; .28; .091 INJECTION, SOLUTION INTRAVENOUS at 13:01

## 2024-08-30 RX ADMIN — AMLODIPINE BESYLATE 5 MILLIGRAM(S): 10 TABLET ORAL at 10:00

## 2024-08-30 NOTE — PROGRESS NOTE ADULT - ASSESSMENT
83 yo woman with Crohn's disease stable on Humira, with hx of short term memory disorder and IBD admitted with Hyponatremia.    --Hyponatremia : Urine study c/w depletion with low Na as well as Osm due to no solute intake for several days.    Correction of sodium noted and now post DdAVP to prevent rapid correction.    Follow up labs closely.  --GI evaluation for symptoms.  --UTI : continue Zosyn and follow symptoms  --Replete K.  D/w Dr Chavarria    8/29  Hyponatremia  Overcorrection halted w DDAVP, Na now 122  d/w Dr Chavarria, will monitor Na level to trend the direction  may start to correct spontaneously  + nausea, will need to be treated to allow pt to eat  Correct hypokalemia as needed       8/30  Hyponatremia- on NS with slow correction of hyponatremia   Overcorrection on Na received DDAVP  Replete K as needed  Dr Yi covering this weekend

## 2024-08-30 NOTE — PROGRESS NOTE ADULT - ASSESSMENT
84 year old female admitted to ICU for hyponatremia and altered mental status   had sodium to 125 in Febuary felt secondary to  HCTZ was seen by nephrology at that [facility]  Altered Mental Status   Hyponatremia   UTI (possible)  HTN         1. Hyponatremia -   Patient was on lexapro , will  hold, correction too quickly now Na trending down 120   s/p IVF normal saline 50cc/hr  , will dc iv fluids and monitor        2. UTI was started on Zosyn  culture [id number] Gram negative rods f/u cx     3. nausea, ? related to hyponatremia,  PPI, monitor diet as tolerated, had endoscopy in recent past for nausea, abdominal ct was negative      will consult GI recommendations appreciated     4. Lovenox - DVT prophylaxis    5.  depression - psych recommended  Victoza     84 year old female admitted to ICU for hyponatremia and altered mental status   had sodium to 125 in Febuary felt secondary to  HCTZ was seen by nephrology at that [facility]  Altered Mental Status   Hyponatremia   UTI (possible)  HTN         1. Hyponatremia -   Patient was on lexapro , will  hold, correction too quickly now Na trending down 120   s/p IVF normal saline 50cc/hr  , will dc iv fluids and monitor        2. UTI was started on Zosyn  culture [id number] Gram negative rods f/u cx     3. nausea, ? related to hyponatremia,  PPI, monitor diet as tolerated, had endoscopy in recent past for nausea, abdominal ct was negative      will consult GI recommendations appreciated     4. Lovenox - DVT prophylaxis    5.  depression - psych recommended  Victoza      GOC- Discussed with Son at bedside, might have MOLST at home, will bring it in. May have HCP discrepancy between him and sister. Interested in continuing medical treatment at this time with possible transition to end of life care in the future.   Will consult palliative care team. Recommendations appreciated.

## 2024-08-31 LAB
-  AMOXICILLIN/CLAVULANIC ACID: SIGNIFICANT CHANGE UP
-  AMPICILLIN/SULBACTAM: SIGNIFICANT CHANGE UP
-  AMPICILLIN: SIGNIFICANT CHANGE UP
-  AZTREONAM: SIGNIFICANT CHANGE UP
-  CEFAZOLIN: SIGNIFICANT CHANGE UP
-  CEFEPIME: SIGNIFICANT CHANGE UP
-  CEFOXITIN: SIGNIFICANT CHANGE UP
-  CEFTRIAXONE: SIGNIFICANT CHANGE UP
-  CEFUROXIME: SIGNIFICANT CHANGE UP
-  CIPROFLOXACIN: SIGNIFICANT CHANGE UP
-  ERTAPENEM: SIGNIFICANT CHANGE UP
-  GENTAMICIN: SIGNIFICANT CHANGE UP
-  LEVOFLOXACIN: SIGNIFICANT CHANGE UP
-  MEROPENEM: SIGNIFICANT CHANGE UP
-  NITROFURANTOIN: SIGNIFICANT CHANGE UP
-  PIPERACILLIN/TAZOBACTAM: SIGNIFICANT CHANGE UP
-  TOBRAMYCIN: SIGNIFICANT CHANGE UP
-  TRIMETHOPRIM/SULFAMETHOXAZOLE: SIGNIFICANT CHANGE UP
ANION GAP SERPL CALC-SCNC: 6 MMOL/L — SIGNIFICANT CHANGE UP (ref 5–17)
BUN SERPL-MCNC: 11 MG/DL — SIGNIFICANT CHANGE UP (ref 7–23)
CALCIUM SERPL-MCNC: 9.2 MG/DL — SIGNIFICANT CHANGE UP (ref 8.5–10.1)
CHLORIDE SERPL-SCNC: 102 MMOL/L — SIGNIFICANT CHANGE UP (ref 96–108)
CO2 SERPL-SCNC: 22 MMOL/L — SIGNIFICANT CHANGE UP (ref 22–31)
CREAT SERPL-MCNC: 0.95 MG/DL — SIGNIFICANT CHANGE UP (ref 0.5–1.3)
CULTURE RESULTS: ABNORMAL
EGFR: 59 ML/MIN/1.73M2 — LOW
GLUCOSE SERPL-MCNC: 96 MG/DL — SIGNIFICANT CHANGE UP (ref 70–99)
HCT VFR BLD CALC: 40.3 % — SIGNIFICANT CHANGE UP (ref 34.5–45)
HGB BLD-MCNC: 13.9 G/DL — SIGNIFICANT CHANGE UP (ref 11.5–15.5)
MCHC RBC-ENTMCNC: 29.4 PG — SIGNIFICANT CHANGE UP (ref 27–34)
MCHC RBC-ENTMCNC: 34.5 GM/DL — SIGNIFICANT CHANGE UP (ref 32–36)
MCV RBC AUTO: 85.2 FL — SIGNIFICANT CHANGE UP (ref 80–100)
METHOD TYPE: SIGNIFICANT CHANGE UP
ORGANISM # SPEC MICROSCOPIC CNT: ABNORMAL
ORGANISM # SPEC MICROSCOPIC CNT: SIGNIFICANT CHANGE UP
PLATELET # BLD AUTO: 377 K/UL — SIGNIFICANT CHANGE UP (ref 150–400)
POTASSIUM SERPL-MCNC: 4.3 MMOL/L — SIGNIFICANT CHANGE UP (ref 3.5–5.3)
POTASSIUM SERPL-SCNC: 4.3 MMOL/L — SIGNIFICANT CHANGE UP (ref 3.5–5.3)
RBC # BLD: 4.73 M/UL — SIGNIFICANT CHANGE UP (ref 3.8–5.2)
RBC # FLD: 12.9 % — SIGNIFICANT CHANGE UP (ref 10.3–14.5)
SODIUM SERPL-SCNC: 130 MMOL/L — LOW (ref 135–145)
SPECIMEN SOURCE: SIGNIFICANT CHANGE UP
WBC # BLD: 9.73 K/UL — SIGNIFICANT CHANGE UP (ref 3.8–10.5)
WBC # FLD AUTO: 9.73 K/UL — SIGNIFICANT CHANGE UP (ref 3.8–10.5)

## 2024-08-31 PROCEDURE — 99232 SBSQ HOSP IP/OBS MODERATE 35: CPT

## 2024-08-31 RX ADMIN — AMLODIPINE BESYLATE 5 MILLIGRAM(S): 10 TABLET ORAL at 09:36

## 2024-08-31 RX ADMIN — Medication 3.75 MILLIGRAM(S): at 21:31

## 2024-08-31 RX ADMIN — ENOXAPARIN SODIUM 40 MILLIGRAM(S): 100 INJECTION SUBCUTANEOUS at 09:38

## 2024-08-31 RX ADMIN — ROSUVASTATIN CALCIUM 5 MILLIGRAM(S): 10 TABLET ORAL at 21:32

## 2024-08-31 RX ADMIN — PIPERACILLIN SODIUM AND TAZOBACTAM SODIUM 25 GRAM(S): 3; .375 INJECTION, POWDER, FOR SOLUTION INTRAVENOUS at 05:25

## 2024-08-31 RX ADMIN — Medication 40 MILLIGRAM(S): at 05:25

## 2024-08-31 RX ADMIN — PIPERACILLIN SODIUM AND TAZOBACTAM SODIUM 25 GRAM(S): 3; .375 INJECTION, POWDER, FOR SOLUTION INTRAVENOUS at 13:37

## 2024-08-31 NOTE — PROGRESS NOTE ADULT - ASSESSMENT
84 year old female admitted to ICU for hyponatremia and altered mental status   had sodium to 125 in Febuary felt secondary to  HCTZ was seen by nephrology at that [facility]  Altered Mental Status   Hyponatremia   UTI (possible)  HTN         1. Hyponatremia -   Patient was on lexapro , will  hold, correction too quickly now Na trending down 120   s/p IVF normal saline 50cc/hr  continue to monitor off IVF       2. UTI was started on Zosyn  culture [id number] Gram negative rods f/u cx - proteus sensitive to Zosyn     3. nausea, ? related to hyponatremia,  PPI, monitor diet as tolerated, had endoscopy in recent past for nausea, abdominal ct was negative      will consult GI recommendations appreciated     4. Lovenox - DVT prophylaxis    5.  depression - psych recommended  Victoza      GOC- Discussed with Son at bedside, might have MOLST at home, will bring it in. May have HCP discrepancy between him and sister. Interested in continuing medical treatment at this time with possible transition to end of life care in the future.   Will consult palliative care team. Recommendations appreciated.

## 2024-09-01 LAB
ANION GAP SERPL CALC-SCNC: 10 MMOL/L — SIGNIFICANT CHANGE UP (ref 5–17)
APPEARANCE UR: ABNORMAL
BACTERIA # UR AUTO: NEGATIVE /HPF — SIGNIFICANT CHANGE UP
BASOPHILS # BLD AUTO: 0.14 K/UL — SIGNIFICANT CHANGE UP (ref 0–0.2)
BASOPHILS NFR BLD AUTO: 0.9 % — SIGNIFICANT CHANGE UP (ref 0–2)
BILIRUB UR-MCNC: NEGATIVE — SIGNIFICANT CHANGE UP
BUN SERPL-MCNC: 7 MG/DL — SIGNIFICANT CHANGE UP (ref 7–23)
CALCIUM SERPL-MCNC: 8.6 MG/DL — SIGNIFICANT CHANGE UP (ref 8.5–10.1)
CAST: 1 /LPF — SIGNIFICANT CHANGE UP (ref 0–4)
CHLORIDE SERPL-SCNC: 99 MMOL/L — SIGNIFICANT CHANGE UP (ref 96–108)
CO2 SERPL-SCNC: 22 MMOL/L — SIGNIFICANT CHANGE UP (ref 22–31)
COLOR SPEC: YELLOW — SIGNIFICANT CHANGE UP
CREAT SERPL-MCNC: 0.96 MG/DL — SIGNIFICANT CHANGE UP (ref 0.5–1.3)
DIFF PNL FLD: ABNORMAL
EGFR: 59 ML/MIN/1.73M2 — LOW
EOSINOPHIL # BLD AUTO: 0.14 K/UL — SIGNIFICANT CHANGE UP (ref 0–0.5)
EOSINOPHIL NFR BLD AUTO: 0.9 % — SIGNIFICANT CHANGE UP (ref 0–6)
GLUCOSE SERPL-MCNC: 110 MG/DL — HIGH (ref 70–99)
GLUCOSE UR QL: NEGATIVE MG/DL — SIGNIFICANT CHANGE UP
HCT VFR BLD CALC: 39 % — SIGNIFICANT CHANGE UP (ref 34.5–45)
HCT VFR BLD CALC: 39.3 % — SIGNIFICANT CHANGE UP (ref 34.5–45)
HGB BLD-MCNC: 13.3 G/DL — SIGNIFICANT CHANGE UP (ref 11.5–15.5)
HGB BLD-MCNC: 13.5 G/DL — SIGNIFICANT CHANGE UP (ref 11.5–15.5)
IMM GRANULOCYTES NFR BLD AUTO: 1 % — HIGH (ref 0–0.9)
KETONES UR-MCNC: NEGATIVE MG/DL — SIGNIFICANT CHANGE UP
LEUKOCYTE ESTERASE UR-ACNC: ABNORMAL
LYMPHOCYTES # BLD AUTO: 15.9 % — SIGNIFICANT CHANGE UP (ref 13–44)
LYMPHOCYTES # BLD AUTO: 2.47 K/UL — SIGNIFICANT CHANGE UP (ref 1–3.3)
MCHC RBC-ENTMCNC: 29.2 PG — SIGNIFICANT CHANGE UP (ref 27–34)
MCHC RBC-ENTMCNC: 29.2 PG — SIGNIFICANT CHANGE UP (ref 27–34)
MCHC RBC-ENTMCNC: 34.1 GM/DL — SIGNIFICANT CHANGE UP (ref 32–36)
MCHC RBC-ENTMCNC: 34.4 GM/DL — SIGNIFICANT CHANGE UP (ref 32–36)
MCV RBC AUTO: 84.9 FL — SIGNIFICANT CHANGE UP (ref 80–100)
MCV RBC AUTO: 85.5 FL — SIGNIFICANT CHANGE UP (ref 80–100)
MONOCYTES # BLD AUTO: 0.99 K/UL — HIGH (ref 0–0.9)
MONOCYTES NFR BLD AUTO: 6.4 % — SIGNIFICANT CHANGE UP (ref 2–14)
NEUTROPHILS # BLD AUTO: 11.69 K/UL — HIGH (ref 1.8–7.4)
NEUTROPHILS NFR BLD AUTO: 74.9 % — SIGNIFICANT CHANGE UP (ref 43–77)
NITRITE UR-MCNC: NEGATIVE — SIGNIFICANT CHANGE UP
PH UR: 6 — SIGNIFICANT CHANGE UP (ref 5–8)
PLATELET # BLD AUTO: 358 K/UL — SIGNIFICANT CHANGE UP (ref 150–400)
PLATELET # BLD AUTO: 416 K/UL — HIGH (ref 150–400)
POTASSIUM SERPL-MCNC: 3.7 MMOL/L — SIGNIFICANT CHANGE UP (ref 3.5–5.3)
POTASSIUM SERPL-SCNC: 3.7 MMOL/L — SIGNIFICANT CHANGE UP (ref 3.5–5.3)
PROT UR-MCNC: NEGATIVE MG/DL — SIGNIFICANT CHANGE UP
RBC # BLD: 4.56 M/UL — SIGNIFICANT CHANGE UP (ref 3.8–5.2)
RBC # BLD: 4.63 M/UL — SIGNIFICANT CHANGE UP (ref 3.8–5.2)
RBC # FLD: 13 % — SIGNIFICANT CHANGE UP (ref 10.3–14.5)
RBC # FLD: 13 % — SIGNIFICANT CHANGE UP (ref 10.3–14.5)
RBC CASTS # UR COMP ASSIST: 1 /HPF — SIGNIFICANT CHANGE UP (ref 0–4)
SODIUM SERPL-SCNC: 131 MMOL/L — LOW (ref 135–145)
SP GR SPEC: 1.01 — SIGNIFICANT CHANGE UP (ref 1–1.03)
SQUAMOUS # UR AUTO: 4 /HPF — SIGNIFICANT CHANGE UP (ref 0–5)
UROBILINOGEN FLD QL: 0.2 MG/DL — SIGNIFICANT CHANGE UP (ref 0.2–1)
WBC # BLD: 15.58 K/UL — HIGH (ref 3.8–10.5)
WBC # BLD: 20.56 K/UL — HIGH (ref 3.8–10.5)
WBC # FLD AUTO: 15.58 K/UL — HIGH (ref 3.8–10.5)
WBC # FLD AUTO: 20.56 K/UL — HIGH (ref 3.8–10.5)
WBC UR QL: 60 /HPF — HIGH (ref 0–5)

## 2024-09-01 PROCEDURE — 99233 SBSQ HOSP IP/OBS HIGH 50: CPT

## 2024-09-01 PROCEDURE — 71045 X-RAY EXAM CHEST 1 VIEW: CPT | Mod: 26

## 2024-09-01 RX ORDER — AZITHROMYCIN 500 MG/1
500 TABLET, FILM COATED ORAL EVERY 24 HOURS
Refills: 0 | Status: COMPLETED | OUTPATIENT
Start: 2024-09-01 | End: 2024-09-03

## 2024-09-01 RX ORDER — MIRTAZAPINE 30 MG
3.75 TABLET ORAL ONCE
Refills: 0 | Status: COMPLETED | OUTPATIENT
Start: 2024-09-01 | End: 2024-09-01

## 2024-09-01 RX ORDER — ACETAMINOPHEN 325 MG/1
650 TABLET ORAL ONCE
Refills: 0 | Status: COMPLETED | OUTPATIENT
Start: 2024-09-01 | End: 2024-09-01

## 2024-09-01 RX ADMIN — ACETAMINOPHEN 650 MILLIGRAM(S): 325 TABLET ORAL at 07:04

## 2024-09-01 RX ADMIN — PIPERACILLIN SODIUM AND TAZOBACTAM SODIUM 25 GRAM(S): 3; .375 INJECTION, POWDER, FOR SOLUTION INTRAVENOUS at 01:45

## 2024-09-01 RX ADMIN — Medication 40 MILLIGRAM(S): at 05:47

## 2024-09-01 RX ADMIN — ENOXAPARIN SODIUM 40 MILLIGRAM(S): 100 INJECTION SUBCUTANEOUS at 09:37

## 2024-09-01 RX ADMIN — ONDANSETRON 4 MILLIGRAM(S): 2 INJECTION, SOLUTION INTRAMUSCULAR; INTRAVENOUS at 21:27

## 2024-09-01 RX ADMIN — AZITHROMYCIN 255 MILLIGRAM(S): 500 TABLET, FILM COATED ORAL at 15:09

## 2024-09-01 RX ADMIN — ROSUVASTATIN CALCIUM 5 MILLIGRAM(S): 10 TABLET ORAL at 21:28

## 2024-09-01 RX ADMIN — ONDANSETRON 4 MILLIGRAM(S): 2 INJECTION, SOLUTION INTRAMUSCULAR; INTRAVENOUS at 01:43

## 2024-09-01 RX ADMIN — Medication 3.75 MILLIGRAM(S): at 21:27

## 2024-09-01 RX ADMIN — ACETAMINOPHEN 650 MILLIGRAM(S): 325 TABLET ORAL at 06:34

## 2024-09-01 RX ADMIN — PIPERACILLIN SODIUM AND TAZOBACTAM SODIUM 25 GRAM(S): 3; .375 INJECTION, POWDER, FOR SOLUTION INTRAVENOUS at 09:37

## 2024-09-01 RX ADMIN — Medication 3.75 MILLIGRAM(S): at 01:41

## 2024-09-01 RX ADMIN — PIPERACILLIN SODIUM AND TAZOBACTAM SODIUM 25 GRAM(S): 3; .375 INJECTION, POWDER, FOR SOLUTION INTRAVENOUS at 16:59

## 2024-09-01 NOTE — PROGRESS NOTE ADULT - ASSESSMENT
85 yo woman with Crohn's disease stable on Humira, with hx of short term memory disorder and IBD admitted with Hyponatremia.    --Hyponatremia : Urine study c/w depletion with low Na as well as Osm due to no solute intake for several days.    Correction of sodium noted s/p DdAVP to prevent rapid correction.    Follow up labs closely.- Na now trending up approriately - as per son pt eating more    No further NS    trend labs daily       Dr Dickson et al to resume care Tuesday

## 2024-09-01 NOTE — PROGRESS NOTE ADULT - ASSESSMENT
84 year old female admitted to ICU for hyponatremia and altered mental status   had sodium to 125 in Febuary felt secondary to  HCTZ was seen by nephrology at that [facility]  Altered Mental Status   Hyponatremia   UTI (possible)  HTN         1. Hyponatremia -   Patient was on lexapro , will  hold, correction too quickly now Na trending down 120   s/p IVF normal saline 50cc/hr  continue to monitor off IVF       2. Leukocytosis   UTI was started on Zosyn  culture [id number] Gram negative rods f/u cx - proteus sensitive to Zosyn   repeat UA, BC and CXR due to new Leukocytosis    3. nausea, ? related to hyponatremia,  PPI, monitor diet as tolerated, had endoscopy in recent past for nausea, abdominal ct was negative. improved          4. Lovenox - DVT prophylaxis    5.  depression - psych recommended  Victoza      GOC- Discussed with Son at bedside, reports MOLST form at home   84 year old female admitted to ICU for hyponatremia and altered mental status   had sodium to 125 in Febuary felt secondary to  HCTZ was seen by nephrology at that [facility]  Altered Mental Status   Hyponatremia   UTI (possible)  HTN         1. Hyponatremia -   Patient was on lexapro , will  hold, correction too quickly now Na trending down 120   s/p IVF normal saline 50cc/hr  continue to monitor off IVF       2. Leukocytosis   UTI was started on Zosyn  culture [id number] Gram negative rods f/u cx - proteus sensitive to Zosyn   repeat UA, BC and CXR due to new Leukocytosis  Will start azithromycin empirically    3. nausea, ? related to hyponatremia,  PPI, monitor diet as tolerated, had endoscopy in recent past for nausea, abdominal ct was negative. improved          4. Lovenox - DVT prophylaxis    5.  depression - psych recommended  Victoza      GOC- Discussed with Son at bedside, reports MOLST form at home

## 2024-09-02 LAB
ANION GAP SERPL CALC-SCNC: 6 MMOL/L — SIGNIFICANT CHANGE UP (ref 5–17)
BUN SERPL-MCNC: 11 MG/DL — SIGNIFICANT CHANGE UP (ref 7–23)
CALCIUM SERPL-MCNC: 9.1 MG/DL — SIGNIFICANT CHANGE UP (ref 8.5–10.1)
CHLORIDE SERPL-SCNC: 97 MMOL/L — SIGNIFICANT CHANGE UP (ref 96–108)
CO2 SERPL-SCNC: 23 MMOL/L — SIGNIFICANT CHANGE UP (ref 22–31)
CREAT SERPL-MCNC: 0.98 MG/DL — SIGNIFICANT CHANGE UP (ref 0.5–1.3)
CULTURE RESULTS: SIGNIFICANT CHANGE UP
CULTURE RESULTS: SIGNIFICANT CHANGE UP
EGFR: 57 ML/MIN/1.73M2 — LOW
GLUCOSE SERPL-MCNC: 99 MG/DL — SIGNIFICANT CHANGE UP (ref 70–99)
HCT VFR BLD CALC: 37.4 % — SIGNIFICANT CHANGE UP (ref 34.5–45)
HGB BLD-MCNC: 12.7 G/DL — SIGNIFICANT CHANGE UP (ref 11.5–15.5)
MCHC RBC-ENTMCNC: 29.2 PG — SIGNIFICANT CHANGE UP (ref 27–34)
MCHC RBC-ENTMCNC: 34 GM/DL — SIGNIFICANT CHANGE UP (ref 32–36)
MCV RBC AUTO: 86 FL — SIGNIFICANT CHANGE UP (ref 80–100)
PLATELET # BLD AUTO: 319 K/UL — SIGNIFICANT CHANGE UP (ref 150–400)
POTASSIUM SERPL-MCNC: 3.6 MMOL/L — SIGNIFICANT CHANGE UP (ref 3.5–5.3)
POTASSIUM SERPL-SCNC: 3.6 MMOL/L — SIGNIFICANT CHANGE UP (ref 3.5–5.3)
RBC # BLD: 4.35 M/UL — SIGNIFICANT CHANGE UP (ref 3.8–5.2)
RBC # FLD: 12.7 % — SIGNIFICANT CHANGE UP (ref 10.3–14.5)
SODIUM SERPL-SCNC: 126 MMOL/L — LOW (ref 135–145)
SPECIMEN SOURCE: SIGNIFICANT CHANGE UP
SPECIMEN SOURCE: SIGNIFICANT CHANGE UP
WBC # BLD: 9.15 K/UL — SIGNIFICANT CHANGE UP (ref 3.8–10.5)
WBC # FLD AUTO: 9.15 K/UL — SIGNIFICANT CHANGE UP (ref 3.8–10.5)

## 2024-09-02 PROCEDURE — 99232 SBSQ HOSP IP/OBS MODERATE 35: CPT

## 2024-09-02 RX ORDER — SODIUM CHLORIDE 9 MG/ML
1000 INJECTION INTRAMUSCULAR; INTRAVENOUS; SUBCUTANEOUS
Refills: 0 | Status: DISCONTINUED | OUTPATIENT
Start: 2024-09-02 | End: 2024-09-03

## 2024-09-02 RX ADMIN — Medication 3.75 MILLIGRAM(S): at 21:30

## 2024-09-02 RX ADMIN — Medication 40 MILLIGRAM(S): at 05:26

## 2024-09-02 RX ADMIN — PIPERACILLIN SODIUM AND TAZOBACTAM SODIUM 25 GRAM(S): 3; .375 INJECTION, POWDER, FOR SOLUTION INTRAVENOUS at 17:23

## 2024-09-02 RX ADMIN — SODIUM CHLORIDE 75 MILLILITER(S): 9 INJECTION INTRAMUSCULAR; INTRAVENOUS; SUBCUTANEOUS at 09:24

## 2024-09-02 RX ADMIN — ENOXAPARIN SODIUM 40 MILLIGRAM(S): 100 INJECTION SUBCUTANEOUS at 09:28

## 2024-09-02 RX ADMIN — PIPERACILLIN SODIUM AND TAZOBACTAM SODIUM 25 GRAM(S): 3; .375 INJECTION, POWDER, FOR SOLUTION INTRAVENOUS at 09:25

## 2024-09-02 RX ADMIN — PIPERACILLIN SODIUM AND TAZOBACTAM SODIUM 25 GRAM(S): 3; .375 INJECTION, POWDER, FOR SOLUTION INTRAVENOUS at 02:13

## 2024-09-02 RX ADMIN — AMLODIPINE BESYLATE 5 MILLIGRAM(S): 10 TABLET ORAL at 09:28

## 2024-09-02 RX ADMIN — Medication 3 MILLIGRAM(S): at 21:30

## 2024-09-02 RX ADMIN — AZITHROMYCIN 255 MILLIGRAM(S): 500 TABLET, FILM COATED ORAL at 13:58

## 2024-09-02 RX ADMIN — ROSUVASTATIN CALCIUM 5 MILLIGRAM(S): 10 TABLET ORAL at 21:30

## 2024-09-02 NOTE — PROGRESS NOTE ADULT - ASSESSMENT
84 year old female admitted to ICU for hyponatremia and altered mental status   had sodium to 125 in Febuary felt secondary to  HCTZ was seen by nephrology at that [facility]  Altered Mental Status   Hyponatremia   UTI (possible)  HTN         1. Hyponatremia -   Patient was on lexapro , will  hold, correction too quickly now Na trending down 120   s/p IVF normal saline 50cc/hr  Restart IVF due to drop in Na  Continue to encourage oral intake.        2. Leukocytosis   UTI was started on Zosyn  culture [id number] Gram negative rods f/u cx - proteus sensitive to Zosyn   Follow up repeat UC, BC  CXR without acute cardiopulmonary process.   Continue Day 2/3 Azithromycin     3. nausea, ? related to hyponatremia,  PPI, monitor diet as tolerated, had endoscopy in recent past for nausea, abdominal ct was negative. improved     4. Lovenox - DVT prophylaxis    5.  depression - psych recommended  Victoza      GOC- Discussed with Son at bedside, reports MOLST form at home

## 2024-09-02 NOTE — PROGRESS NOTE ADULT - ASSESSMENT
85 yo woman with Crohn's disease stable on Humira, with hx of short term memory disorder and IBD admitted with Hyponatremia.    --Hyponatremia : Urine study c/w depletion with low Na as well as Osm due to no solute intake for several days.  Na was improving but dropped today    pt not eating but some nausea as well    pt started on IVNS as per hospitalist team - trend Na response   encourage osmolar intake    controle nausea   if Na drops further then free water restriction and stop IVF NS    * pt seen earlier, note now   Dr Dickson et al to resume care Tuesday 9/3

## 2024-09-03 ENCOUNTER — TRANSCRIPTION ENCOUNTER (OUTPATIENT)
Age: 84
End: 2024-09-03

## 2024-09-03 DIAGNOSIS — R41.3 OTHER AMNESIA: ICD-10-CM

## 2024-09-03 LAB
ALBUMIN SERPL ELPH-MCNC: 2.7 G/DL — LOW (ref 3.3–5)
ANION GAP SERPL CALC-SCNC: 6 MMOL/L — SIGNIFICANT CHANGE UP (ref 5–17)
ANION GAP SERPL CALC-SCNC: 8 MMOL/L — SIGNIFICANT CHANGE UP (ref 5–17)
ANION GAP SERPL CALC-SCNC: 8 MMOL/L — SIGNIFICANT CHANGE UP (ref 5–17)
BUN SERPL-MCNC: 11 MG/DL — SIGNIFICANT CHANGE UP (ref 7–23)
BUN SERPL-MCNC: 14 MG/DL — SIGNIFICANT CHANGE UP (ref 7–23)
BUN SERPL-MCNC: 15 MG/DL — SIGNIFICANT CHANGE UP (ref 7–23)
CALCIUM SERPL-MCNC: 9.2 MG/DL — SIGNIFICANT CHANGE UP (ref 8.5–10.1)
CALCIUM SERPL-MCNC: 9.3 MG/DL — SIGNIFICANT CHANGE UP (ref 8.5–10.1)
CALCIUM SERPL-MCNC: 9.4 MG/DL — SIGNIFICANT CHANGE UP (ref 8.5–10.1)
CHLORIDE SERPL-SCNC: 104 MMOL/L — SIGNIFICANT CHANGE UP (ref 96–108)
CHLORIDE SERPL-SCNC: 105 MMOL/L — SIGNIFICANT CHANGE UP (ref 96–108)
CHLORIDE SERPL-SCNC: 105 MMOL/L — SIGNIFICANT CHANGE UP (ref 96–108)
CO2 SERPL-SCNC: 23 MMOL/L — SIGNIFICANT CHANGE UP (ref 22–31)
CO2 SERPL-SCNC: 25 MMOL/L — SIGNIFICANT CHANGE UP (ref 22–31)
CO2 SERPL-SCNC: 26 MMOL/L — SIGNIFICANT CHANGE UP (ref 22–31)
CREAT SERPL-MCNC: 0.85 MG/DL — SIGNIFICANT CHANGE UP (ref 0.5–1.3)
CREAT SERPL-MCNC: 0.88 MG/DL — SIGNIFICANT CHANGE UP (ref 0.5–1.3)
CREAT SERPL-MCNC: 0.9 MG/DL — SIGNIFICANT CHANGE UP (ref 0.5–1.3)
CULTURE RESULTS: NO GROWTH — SIGNIFICANT CHANGE UP
CULTURE RESULTS: SIGNIFICANT CHANGE UP
EGFR: 63 ML/MIN/1.73M2 — SIGNIFICANT CHANGE UP
EGFR: 65 ML/MIN/1.73M2 — SIGNIFICANT CHANGE UP
EGFR: 68 ML/MIN/1.73M2 — SIGNIFICANT CHANGE UP
GLUCOSE SERPL-MCNC: 105 MG/DL — HIGH (ref 70–99)
GLUCOSE SERPL-MCNC: 117 MG/DL — HIGH (ref 70–99)
GLUCOSE SERPL-MCNC: 83 MG/DL — SIGNIFICANT CHANGE UP (ref 70–99)
HCT VFR BLD CALC: 34.1 % — LOW (ref 34.5–45)
HGB BLD-MCNC: 11.4 G/DL — LOW (ref 11.5–15.5)
MCHC RBC-ENTMCNC: 29.5 PG — SIGNIFICANT CHANGE UP (ref 27–34)
MCHC RBC-ENTMCNC: 33.4 GM/DL — SIGNIFICANT CHANGE UP (ref 32–36)
MCV RBC AUTO: 88.3 FL — SIGNIFICANT CHANGE UP (ref 80–100)
PHOSPHATE SERPL-MCNC: 3.2 MG/DL — SIGNIFICANT CHANGE UP (ref 2.5–4.5)
PLATELET # BLD AUTO: 366 K/UL — SIGNIFICANT CHANGE UP (ref 150–400)
POTASSIUM SERPL-MCNC: 3.5 MMOL/L — SIGNIFICANT CHANGE UP (ref 3.5–5.3)
POTASSIUM SERPL-MCNC: 3.8 MMOL/L — SIGNIFICANT CHANGE UP (ref 3.5–5.3)
POTASSIUM SERPL-MCNC: 4.6 MMOL/L — SIGNIFICANT CHANGE UP (ref 3.5–5.3)
POTASSIUM SERPL-SCNC: 3.5 MMOL/L — SIGNIFICANT CHANGE UP (ref 3.5–5.3)
POTASSIUM SERPL-SCNC: 3.8 MMOL/L — SIGNIFICANT CHANGE UP (ref 3.5–5.3)
POTASSIUM SERPL-SCNC: 4.6 MMOL/L — SIGNIFICANT CHANGE UP (ref 3.5–5.3)
RBC # BLD: 3.86 M/UL — SIGNIFICANT CHANGE UP (ref 3.8–5.2)
RBC # FLD: 13.1 % — SIGNIFICANT CHANGE UP (ref 10.3–14.5)
SODIUM SERPL-SCNC: 136 MMOL/L — SIGNIFICANT CHANGE UP (ref 135–145)
SODIUM SERPL-SCNC: 136 MMOL/L — SIGNIFICANT CHANGE UP (ref 135–145)
SODIUM SERPL-SCNC: 138 MMOL/L — SIGNIFICANT CHANGE UP (ref 135–145)
SPECIMEN SOURCE: SIGNIFICANT CHANGE UP
SPECIMEN SOURCE: SIGNIFICANT CHANGE UP
WBC # BLD: 9.33 K/UL — SIGNIFICANT CHANGE UP (ref 3.8–10.5)
WBC # FLD AUTO: 9.33 K/UL — SIGNIFICANT CHANGE UP (ref 3.8–10.5)

## 2024-09-03 PROCEDURE — 99232 SBSQ HOSP IP/OBS MODERATE 35: CPT

## 2024-09-03 PROCEDURE — 99223 1ST HOSP IP/OBS HIGH 75: CPT

## 2024-09-03 RX ORDER — SENNA 187 MG
2 TABLET ORAL AT BEDTIME
Refills: 0 | Status: DISCONTINUED | OUTPATIENT
Start: 2024-09-03 | End: 2024-09-04

## 2024-09-03 RX ADMIN — AMLODIPINE BESYLATE 5 MILLIGRAM(S): 10 TABLET ORAL at 08:58

## 2024-09-03 RX ADMIN — SODIUM CHLORIDE 75 MILLILITER(S): 9 INJECTION INTRAMUSCULAR; INTRAVENOUS; SUBCUTANEOUS at 03:56

## 2024-09-03 RX ADMIN — ENOXAPARIN SODIUM 40 MILLIGRAM(S): 100 INJECTION SUBCUTANEOUS at 08:57

## 2024-09-03 RX ADMIN — Medication 3.75 MILLIGRAM(S): at 22:25

## 2024-09-03 RX ADMIN — PIPERACILLIN SODIUM AND TAZOBACTAM SODIUM 25 GRAM(S): 3; .375 INJECTION, POWDER, FOR SOLUTION INTRAVENOUS at 03:56

## 2024-09-03 RX ADMIN — Medication 40 MILLIGRAM(S): at 05:27

## 2024-09-03 RX ADMIN — Medication 3 MILLIGRAM(S): at 22:26

## 2024-09-03 RX ADMIN — ROSUVASTATIN CALCIUM 5 MILLIGRAM(S): 10 TABLET ORAL at 22:25

## 2024-09-03 RX ADMIN — PIPERACILLIN SODIUM AND TAZOBACTAM SODIUM 25 GRAM(S): 3; .375 INJECTION, POWDER, FOR SOLUTION INTRAVENOUS at 10:33

## 2024-09-03 RX ADMIN — AZITHROMYCIN 255 MILLIGRAM(S): 500 TABLET, FILM COATED ORAL at 14:32

## 2024-09-03 RX ADMIN — PIPERACILLIN SODIUM AND TAZOBACTAM SODIUM 25 GRAM(S): 3; .375 INJECTION, POWDER, FOR SOLUTION INTRAVENOUS at 19:27

## 2024-09-03 NOTE — DISCHARGE NOTE NURSING/CASE MANAGEMENT/SOCIAL WORK - NSDCPEFALRISK_GEN_ALL_CORE
For information on Fall & Injury Prevention, visit: [email or web address] OR  [email or web address] OR  https://www.cdc.gov/steadi/patient.html

## 2024-09-03 NOTE — CONSULT NOTE ADULT - ASSESSMENT
Process of Care  --Reviewed dx/treatment problems and alignment with Goals of Care     Hyponatremia possibly 2/2medications  UTI -tx of abx  AMS -possible undx dementia? acute worsening by metabolic encephalopathy  ANICETO   HTN   dementia vs. adjustment disorder- psych following    Physical Aspects of Care  --Pain  patient denies at this time     --Bowel Regimen  denies constipation  risk for constipation d/t immobility  Senna 2 tabs QHS PRN     --Dyspnea  No SOB at this time  comfortable and in NAD    --Nausea Vomiting  denies    --Weakness  PT as tolerated     Psychological and Psychiatric Aspects of Care:   Grief Bereavement emotional support provided  --Hx of psychiatric dx: depression, anxiety, somatic disorder    Spiritual care  -Pastoral Care Available PRN     Social Aspects of Care  -SW involved     Cultural Aspects  -Primary Language: English    Goals of Care:     We discussed Palliative Care team being a supportive team when a patient has ongoing illnesses.  We also discussed that it is not an end of life care service, but can help navigate symptoms and emotional support througout their hospital stay here.       Ethical and Legal Aspects:   NA        Capacity: pt defers- w/ simple capacity at best-   HCP/Surrogate: Dougie Canada is primary on [facility]  Code Status: FULL CODE  MOLST: none on charts   Dispo Plan: pending further treatment and discussion w/ [facility]    Discussed With: Case coordinated with attending and SW and RN     Time Spent: 90 minutes including the care, coordination and counseling of this patient, 50% of which was spent coordinating and counseling. 
85 yo woman with Crohn's disease stable on Humira, with hx of short term memory disorder and IBD admitted with Hyponatremia.    --Hyponatremia : Urine study c/w depletion with low Na as well as Osm due to no solute intake for several days.    Correction of sodium noted and now post DdAVP to prevent rapid correction.    Follow up labs closely.  --GI evaluation for symptoms.  --UTI : continue Zosyn and follow symptoms  --Replete K.  D/w Dr Chavarria

## 2024-09-03 NOTE — PROGRESS NOTE ADULT - ASSESSMENT
83 yo woman with Crohn's disease stable on Humira, with hx of short term memory disorder and IBD admitted with Hyponatremia.    --Hyponatremia : Urine study c/w depletion with low Na as well as Osm due to no solute intake for several days.  Na was improving but dropped today    pt not eating but some nausea as well    pt started on IVNS as per hospitalist team - trend Na response   encourage osmolar intake    control nausea   if Na drops further then free water restriction and stop IVF NS    * pt seen earlier, note now   Dr Dickson et al to resume care Tuesday 9/3     9/3  Hyponatremia resolving w NS, now dc d  Pt is drinking free water  Will monitor labs  Chem 7 this evening

## 2024-09-03 NOTE — DISCHARGE NOTE NURSING/CASE MANAGEMENT/SOCIAL WORK - PATIENT PORTAL LINK FT
You can access the FollowMyHealth Patient Portal offered by [facility] by registering at the following website: [email or web address]. By joining [employer]’s FollowMyHealth portal, you will also be able to view your health information using other applications (apps) compatible with our system.

## 2024-09-03 NOTE — PROGRESS NOTE ADULT - ASSESSMENT
84 year old female admitted to ICU for hyponatremia and altered mental status   had sodium to 125 in Febuary felt secondary to  HCTZ was seen by nephrology at that [facility]  Altered Mental Status   Hyponatremia   UTI (possible)  HTN         1. Hyponatremia -   Patient was on lexapro , will  hold, correction too quickly now Na trending down 120   s/p IVF normal saline 50cc/hr  Continue to encourage oral intake.   DC IVF , repeat BMP        2. Leukocytosis   UTI was started on Zosyn  culture [id number] Gram negative rods f/u cx - proteus sensitive to Zosyn   Follow up repeat UC, BC  CXR without acute cardiopulmonary process.   Continue Day 3/3 Azithromycin - complete today     3. nausea, ? related to hyponatremia,  PPI, monitor diet as tolerated, had endoscopy in recent past for nausea, abdominal ct was negative. improved     4. Lovenox - DVT prophylaxis    5.  depression - psych recommended  Victoza      GOC- Discussed with Son at bedside, palliative care consulted, recommendations appreciated .

## 2024-09-03 NOTE — CONSULT NOTE ADULT - LOCATION OF DISCUSSION
HPI/Subjective:   Patient ID: Jessy Cruz is a 73year old female.     HPI    History/Other:   Review of Systems  Current Outpatient Medications   Medication Sig Dispense Refill   • Insulin Pen Needle (TECHLITE PEN NEEDLES) 32G X 4 MM Does not apply Misc g 0   • Glucose Blood (ONETOUCH VERIO) In Vitro Strip USE FOUR TIMES A DAY. DX: E11.9. NPI [id number]. Insulin dependent.  100 strip 11   • Insulin Pen Needle (BD PEN NEEDLE MINI U/F) 31G X 5 MM Does not apply Misc USE AT BEDTIME AS DIRECTED 100 each 5   • Telephone

## 2024-09-03 NOTE — GOALS OF CARE CONVERSATION - ADVANCED CARE PLANNING - CONVERSATION DETAILS
HPI:   84 year old female  PMHx of memory disorder, undifferentiated somatoform disorder, Crohn's disease, osteoporosis, MDD, IBD, GERD s/p recent EGD . She presented  to ER with low sodium levels that were reported from her GI doctor based on blood work drawn at appointment  and was advised to bring patient to ED   She has been  c/o general malaise and fatigue with nausea and vomiting  for weeks  per the notes.     In the ER she is confused to place and time verbally she denies  CP, SOB, dizziness. Labs found her to be hyponatremic with sodium or 115 she has leukocytosis with WBC 19. I performed bedside POCUS and found her intravascularly depleted with flattening IVC on respirations indicating likely  hypovolemic hyponatremia. The UA has returned to show eveidence of UTI for which she was started on antibiotics  (27 Aug 2024 23:32)      PERTINENT PMH REVIEWED:  [x ] YES [ ] NO           Primary Contact: Dougie Canada                  Relationship:   son                      HCP/Surrogate:   Dougie Canada              Phone Number: [phone]    HCP [ x ] Surrogate [   ] Guardian [   ]    Mental Status: [x ] Alert  [  ] Oriented [  x] Confused [  ] Lethargic [  ] Pt. appeared alert with poor short term memory, confused as to reason for admission   Concerns of Depression [  ] Not identified   Anxiety [   ] Not identified   Baseline ADLs (prior to admission): Pt independent pta   Independent [ ] moderately [ ] fully   Dependent   [ ] moderately [ ]fully    Family Meeting attendees: Pt, and son Dougie     Anticpated Grief: Patient[ x ] Family [  x]    Caregiver [place] Assesed: Yes [  ] No [  ]    Muslim:    Spirtual Concerns:    Goals of Care: Comfort [  ] Rehabiltation [  ] Curative [  ] Life Prolonging [  ]    Previous Services:    ADVANCE DIRECTIVES:  [ ] YES [ ] NO   DNR [ ] YES [ ] NO  DNI [ ] YES [ ] NO Completed on:                     MOLST  [ ] YES [ ] NO   Completed on:  Living Will  [ ] YES [ ] NO   Completed on:    Anticipated D/C Plan: Home vs HC                      Summary: Palliative SW met with pt who appeared a&o with poor memory, noted to have short term memory d/o per chart. Pt did not recall reason for admit. Pt. reported she resides with sabiha Constantino. Pt reported she has 3 children, one . Pt was in agreement with SW to call sabiha Constantino to review role of palliative team as pt presenting with short term memory loss. HPI:   84 year old female  PMHx of memory disorder, undifferentiated somatoform disorder, Crohn's disease, osteoporosis, MDD, IBD, GERD s/p recent EGD . She presented  to ER with low sodium levels that were reported from her GI doctor based on blood work drawn at appointment  and was advised to bring patient to ED   She has been  c/o general malaise and fatigue with nausea and vomiting  for weeks  per the notes.     In the ER she is confused to place and time verbally she denies  CP, SOB, dizziness. Labs found her to be hyponatremic with sodium or 115 she has leukocytosis with WBC 19. I performed bedside POCUS and found her intravascularly depleted with flattening IVC on respirations indicating likely  hypovolemic hyponatremia. The UA has returned to show eveidence of UTI for which she was started on antibiotics  (27 Aug 2024 23:32)      PERTINENT PMH REVIEWED:  [x ] YES [ ] NO           Primary Contact: Dougie Canada                  Relationship:   son                      HCP/Surrogate:   Dougie Canada              Phone Number: [phone]    HCP [ x ] Surrogate [   ] Guardian [   ]    Mental Status: [x ] Alert  [  ] Oriented [  x] Confused [  ] Lethargic [  ] Pt. appeared alert with poor short term memory, confused as to reason for admission   Concerns of Depression [  ] Not identified   Anxiety [   ] Not identified   Baseline ADLs (prior to admission): Pt independent pta   Independent [ ] moderately [ ] fully   Dependent   [ ] moderately [ x]fully     Family Meeting attendees: Pt, and son Dougie     Anticpated Grief: Patient[ x ] Family [  x]    Caregiver [place] Assesed: Yes [ x ] No [  ]    Synagogue:    Spirtual Concerns:  available as needed     Goals of Care: Comfort [  ] Rehabiltation [  x] Curative [  ] Life Prolonging [  ]    Previous Services:     ADVANCE DIRECTIVES:  [ ] YES [ ] NO   DNR [ ] YES [ ] NO  DNI [ ] YES [ ] NO Completed on:                     MOLST  [ ] YES [ ] NO   Completed on:  Living Will  [ ] YES [ ] NO   Completed on:    Anticipated D/C Plan: Home                       Summary: Palliative SW met with pt who appeared a&o with poor memory, noted to have short term memory d/o per chart. Pt did not recall reason for admit. Pt. reported she resides with son Dougie. Pt reported she has 3 children, one . Pt was in agreement with SW to call son Dougie to review role of palliative team as pt presenting with short term memory loss. SW met with pt's son Dougie and educated to role of palliative care team, to assist with pain and symptom management, review GOC and advance directives and provide support. We discussed MOLST form and pt's son reported that he is HCP, and that pt's wishes are to be DNR only when she is at end of life and pain can not be managed. Pt currently has no limitations placed and pt's son did not implement MOLST at this time. Pt's son reported pt. has short term memory loss for 14 years, hx of a panic attack at age 41. Pt's son reported pt. has been to neurologist and other specialists for care over the years. Pt's son reported he moved from [facility] into pt's home and that pt has private hire care currently consisting of companions 5 days a week for 9 hrs day and 7 hrs a day on weekend days. Pt's son reported pt's other adult child is  and pt has a daughter. Pt's son declined need for any additional community resources such as private hire list as he reported having ample lists at home from a previous stay at hospital. Pt's son reported he is anticipating a possible d/c home  per his discussion with . Pt's son had no additional concerns for Palliative Team at this time and was made aware of our availability if questions arise. HPI:   84 year old female  PMHx of memory disorder, undifferentiated somatoform disorder, Crohn's disease, osteoporosis, MDD, IBD, GERD s/p recent EGD . She presented  to ER with low sodium levels that were reported from her GI doctor based on blood work drawn at appointment  and was advised to bring patient to ED   She has been  c/o general malaise and fatigue with nausea and vomiting  for weeks  per the notes.     In the ER she is confused to place and time verbally she denies  CP, SOB, dizziness. Labs found her to be hyponatremic with sodium or 115 she has leukocytosis with WBC 19. I performed bedside POCUS and found her intravascularly depleted with flattening IVC on respirations indicating likely  hypovolemic hyponatremia. The UA has returned to show eveidence of UTI for which she was started on antibiotics  (27 Aug 2024 23:32)      PERTINENT PMH REVIEWED:  [x ] YES [ ] NO           Primary Contact: Dougie Canada                  Relationship:   son                      HCP/Surrogate:   Dougie Canada              Phone Number: [phone]    HCP [ x ] Surrogate [   ] Guardian [   ]    Mental Status: [x ] Alert  [  ] Oriented [  x] Confused [  ] Lethargic [  ] Pt. appeared alert with poor short term memory, confused as to reason for admission   Concerns of Depression [  ] Not identified   Anxiety [   ] Not identified   Baseline ADLs (prior to admission): Pt independent pta   Independent [ ] moderately [ ] fully   Dependent   [ ] moderately [ x]fully     Family Meeting attendees: Pt, and son Dougie     Anticpated Grief: Patient[ x ] Family [  x]    Caregiver [place] Assesed: Yes [ x ] No [  ]    Oriental orthodox: Mu-ism     Spirtual Concerns:  available as needed     Goals of Care: Comfort [  ] Rehabiltation [  x] Curative [  ] Life Prolonging [  ]    Previous Services:     ADVANCE DIRECTIVES:  [ ] YES [ ] NO   DNR [ ] YES [ ] NO  DNI [ ] YES [ ] NO Completed on:                     MOLST  [ ] YES [ ] NO   Completed on:  Living Will  [ ] YES [ ] NO   Completed on:    Anticipated D/C Plan: Home                       Summary: Palliative SW met with pt who appeared a&o with poor memory, noted to have short term memory d/o per chart. Pt did not recall reason for admit. Pt. reported she resides with son Dougie. Pt reported she has 3 children, one . Pt was in agreement with SW to call son Dougie to review role of palliative team as pt presenting with short term memory loss. SW met with pt's son Dougie and educated to role of palliative care team, to assist with pain and symptom management, review GOC and advance directives and provide support. We discussed MOLST form and pt's son reported that he is HCP, and that pt's wishes are to be DNR only when she is at end of life and pain can not be managed. Pt currently has no limitations placed and pt's son did not implement MOLST at this time. Pt's son reported pt. has short term memory loss for 14 years, hx of a panic attack at age 41. Pt's son reported pt. has been to neurologist and other specialists for care over the years. Pt's son reported he moved from [facility] into pt's home and that pt has private hire care currently consisting of companions 5 days a week for 9 hrs day and 7 hrs a day on weekend days. Pt's son reported pt's other adult child is  and pt has a daughter. Pt's son declined need for any additional community resources such as private hire list as he reported having ample lists at home from a previous stay at hospital. Pt's son reported he is anticipating a possible d/c home  per his discussion with . Pt's son had no additional concerns for Palliative Team at this time and was made aware of our availability if questions arise. HPI:   84 year old female  PMHx of memory disorder, undifferentiated somatoform disorder, Crohn's disease, osteoporosis, MDD, IBD, GERD s/p recent EGD . She presented  to ER with low sodium levels that were reported from her GI doctor based on blood work drawn at appointment  and was advised to bring patient to ED   She has been  c/o general malaise and fatigue with nausea and vomiting  for weeks  per the notes.     In the ER she is confused to place and time verbally she denies  CP, SOB, dizziness. Labs found her to be hyponatremic with sodium or 115 she has leukocytosis with WBC 19. I performed bedside POCUS and found her intravascularly depleted with flattening IVC on respirations indicating likely  hypovolemic hyponatremia. The UA has returned to show eveidence of UTI for which she was started on antibiotics  (27 Aug 2024 23:32)      PERTINENT PMH REVIEWED:  [x ] YES [ ] NO           Primary Contact: Dougie Canada                  Relationship:   son                      HCP/Surrogate:   Dougie Canada              Phone Number: [phone]    HCP [ x ] Surrogate [   ] Guardian [   ]    Mental Status: [x ] Alert  [  ] Oriented [  x] Confused [  ] Lethargic [  ] Pt. appeared alert with poor short term memory, confused as to reason for admission   Concerns of Depression [  ] Not identified   Anxiety [   ] Not identified   Baseline ADLs (prior to admission): Pt independent pta   Independent [ ] moderately [ ] fully   Dependent   [ ] moderately [ x]fully     Family Meeting attendees: Pt, and son Dougie     Anticpated Grief: Patient[ x ] Family [  x]    Caregiver [place] Assesed: Yes [ x ] No [  ]    Sikh: Alevism     Spirtual Concerns:  available as needed     Goals of Care: Comfort [  ] Rehabiltation [  x] Curative [  ] Life Prolonging [  ]    Previous Services:     ADVANCE DIRECTIVES:  [ ] YES [ ] NO   DNR [ ] YES [ ] NO  DNI [ ] YES [ ] NO Completed on:                     MOLST  [ ] YES [ ] NO   Completed on:  Living Will  [ ] YES [ ] NO   Completed on:    Anticipated D/C Plan: Home                       Summary: Palliative SW met with pt who appeared a&o with poor memory, noted to have short term memory d/o per chart. Pt did not recall reason for admit. Pt. reported she resides with son Dougie. Pt reported she has 3 children, one . Pt was in agreement with SW to call son Dougie to review role of palliative team as pt presenting with short term memory loss. SW met with pt's son Dougie and educated to role of palliative care team, to assist with pain and symptom management, review GOC and advance directives and provide support. We discussed MOLST form and pt's son reported that he is HCP, and that pt's wishes are to be DNR only when she is at end of life and pain can not be managed. Pt  has no limitations placed on care and pt's son did not implement MOLST at this time. Pt's son reported pt. has short term memory loss for 14 years, hx of a panic attack at age 41, and he reported exact cause of memory loss has been undiagnosed as per multiple medical opinions in past. Pt's son reported pt. has been to neurologist and other specialists for care over the years, currently takes antidepressants. Pt's son reported he moved from [facility] into pt's home as caregiver and that pt has private hire care currently consisting of companions 5 days a week for 9 hrs day and 7 hrs a day on weekend days. Pt's son reported pt's other adult child is  and pt has a daughter. Pt's son reported pt is a  and her spouse had been on hospice care. Pt's son declined need for any additional community resources such as HC or private hire list as he reported having ample lists at home from a previous stay at hospital. Pt's son reported he is anticipating a possible d/c home  per his discussion with . Pt's son had no additional concerns for Palliative Team at this time and was made aware of our availability if questions arise.

## 2024-09-03 NOTE — CONSULT NOTE ADULT - CONVERSATION DETAILS
We discussed MOLST form and pt's son reported that he is HCP, and that pt's wishes are to be DNR only when she is at end of life and pain can not be managed. Pt  has no limitations placed on care and pt's son did not implement MOLST at this time. Pt's son reported pt. has short term memory loss for 14 years, hx of a panic attack at age 41, and he reported exact cause of memory loss has been undiagnosed as per multiple medical opinions in past. Pt's son reported pt. has been to neurologist and other specialists for care over the years, currently takes antidepressants. Pt's son reported he moved from [facility] into pt's home as caregiver and that pt has private hire care currently consisting of companions 5 days a week for 9 hrs day and 7 hrs a day on weekend days. Pt's son reported pt's other adult child is  and pt has a daughter. Pt's son reported pt is a  and her spouse had been on hospice care. Pt's son declined need for any additional community resources such as HC or private hire list as he reported having ample lists at home from a previous stay at hospital. Pt's son reported he is anticipating a possible d/c home  per his discussion with . Pt's son had no additional concerns for Palliative Team at this time and was made aware of our availability if questions arise.    AT this time wants to continue all disease orietned treatments with out limitations

## 2024-09-03 NOTE — PROGRESS NOTE ADULT - NUTRITIONAL ASSESSMENT
This patient has been assessed with a concern for Malnutrition and has been determined to have a diagnosis/diagnoses of Severe protein-calorie malnutrition.    This patient is being managed with:   Diet Regular-  Entered: Aug 29 2024  7:30AM  

## 2024-09-03 NOTE — CONSULT NOTE ADULT - SUBJECTIVE AND OBJECTIVE BOX
HPI:  " 84 year old female  PMHx of memory disorder, undifferentiated somatoform disorder, Crohn's disease, osteoporosis, MDD, IBD, GERD s/p recent EGD . She presented  to ER with low sodium levels that were reported from her GI doctor based on blood work drawn at appointment  and was advised to bring patient to ED   She has been  c/o general malaise and fatigue with nausea and vomiting  for weeks  per the notes.     In the ER she is confused to place and time verbally she denies  CP, SOB, dizziness. Labs found her to be hyponatremic with sodium or 115 she has leukocytosis with WBC 19. I performed bedside POCUS and found her intravascularly depleted with flattening IVC on respirations indicating likely  hypovolemic hyponatremia. The UA has returned to show eveidence of UTI for which she was started on antibiotics  "     9/3  pt seen and examined confused  knows she's in the hospital doesn't know why , what's happened and defrs medical discussions to her son Dougie.   Pts chat reviewed as she is unable to provide much collateral information.     Will reach out to Son Dougie for further rinfo.  [facility] and Jennie Hall is her Alternate    PAIN: ( ) YES  ( X)  NO  Pt unable to characterise d/t clinical status     DYSPNEA ( ) Yes ( X) No  Patient unable to characterise d/t clinical status   PAST MEDICAL & SURGICAL HISTORY:  Undifferentiated somatoform disorder      Memory disorder      Major depressive disorder      IBD (inflammatory bowel disease)      Esophageal reflux      Osteoporosis      H/O Crohn's disease      S/P tonsillectomy          SOCIAL HX:    Hx opiate tolerance ( )YES  ( )NO    Baseline ADLs  (Prior to Admission)  ( ) Independent   ( )Dependent    FAMILY HISTORY:      Review of Systems:    Anxiety- +   Depression- hx of   Physical Discomfort- none - denies  Dyspnea- denies  Constipation-   Diarrhea- denies  Nausea- none  Vomiting- none  Anorexia-  Weight Loss-   Cough-  Secretions-  Fatigue- +  Weakness-  Delirium-  +       Unable to obtain/Limited due to: confusion      PHYSICAL EXAM:    Vital Signs Last 24 Hrs  T(C): 36.3 (03 Sep 2024 07:52), Max: 36.3 (02 Sep 2024 16:12)  T(F): 97.3 (03 Sep 2024 07:52), Max: 97.4 (02 Sep 2024 16:12)  HR: 62 (03 Sep 2024 07:52) (62 - 82)  BP: 102/67 (03 Sep 2024 07:52) (102/67 - 134/68)  BP(mean): --  RR: 18 (03 Sep 2024 07:52) (18 - 19)  SpO2: 96% (03 Sep 2024 07:52) (96% - 97%)    Parameters below as of 03 Sep 2024 07:52  Patient On (Oxygen Delivery Method): room air      Daily     Daily     PPSV2:30   %  FAST:    General: calm in NAD  Mental Status: awake alert oriented x1-2  HEENT: eomi, perrl  Lungs: ctabl b/l bs  Cardiac: s1s2 no mgr  GI: soft nontender +BS  : voids  Ext: moves all 4 extremities spontaneously  Neuro: confusion, follows simple commands a+Ox1 to self    LABS:                        11.4   9.33  )-----------( 366      ( 03 Sep 2024 08:38 )             34.1     09-03    138  |  104  |  15  ----------------------------<  83  3.5   |  26  |  0.88    Ca    9.2      03 Sep 2024 08:38        Albumin: Albumin: 3.0 g/dL (08-30 @ 15:05)      Allergies    No Known Allergies    Intolerances      MEDICATIONS  (STANDING):  amLODIPine   Tablet 5 milliGRAM(s) Oral daily  azithromycin  IVPB 500 milliGRAM(s) IV Intermittent every 24 hours  enoxaparin Injectable 40 milliGRAM(s) SubCutaneous every 24 hours  mirtazapine 3.75 milliGRAM(s) Oral at bedtime  pantoprazole    Tablet 40 milliGRAM(s) Oral before breakfast  piperacillin/tazobactam IVPB.. 3.375 Gram(s) IV Intermittent every 8 hours  rosuvastatin 5 milliGRAM(s) Oral at bedtime    MEDICATIONS  (PRN):  meclizine 12.5 milliGRAM(s) Oral two times a day PRN for dizziness  melatonin 3 milliGRAM(s) Oral at bedtime PRN Insomnia  ondansetron Injectable 4 milliGRAM(s) IV Push every 8 hours PRN Nausea and/or Vomiting      RADIOLOGY/ADDITIONAL STUDIES:   HPI:  " 84 year old female  PMHx of memory disorder, undifferentiated somatoform disorder, Crohn's disease, osteoporosis, MDD, IBD, GERD s/p recent EGD . She presented  to ER with low sodium levels that were reported from her GI doctor based on blood work drawn at appointment  and was advised to bring patient to ED   She has been  c/o general malaise and fatigue with nausea and vomiting  for weeks  per the notes.     In the ER she is confused to place and time verbally she denies  CP, SOB, dizziness. Labs found her to be hyponatremic with sodium or 115 she has leukocytosis with WBC 19. I performed bedside POCUS and found her intravascularly depleted with flattening IVC on respirations indicating likely  hypovolemic hyponatremia. The UA has returned to show eveidence of UTI for which she was started on antibiotics  "     9/3  pt seen and examined confused  knows she's in the hospital doesn't know why , what's happened and defrs medical discussions to her son Dougie.   Pts chat reviewed as she is unable to provide much collateral information.     Will reach out to Son Dougie for further rinfo.  [facility] and Jennie Simonster is her Alternate    PAIN: ( ) YES  ( X)  NO  Pt unable to characterise d/t clinical status     DYSPNEA ( ) Yes ( X) No  Patient unable to characterise d/t clinical status       PAST MEDICAL & SURGICAL HISTORY:  Undifferentiated somatoform disorder      Memory disorder      Major depressive disorder      IBD (inflammatory bowel disease)      Esophageal reflux      Osteoporosis      H/O Crohn's disease      S/P tonsillectomy          SOCIAL HX:    Hx opiate tolerance ( )YES  ( x)NO    Baseline ADLs  (Prior to Admission)  ( ) Independent   ( x)Dependent    FAMILY HISTORY:      Review of Systems:    Anxiety- +   Depression- hx of   Physical Discomfort- none - denies  Dyspnea- denies  Constipation-   Diarrhea- denies  Nausea- none  Vomiting- none  Anorexia-  Weight Loss-   Cough-  Secretions-  Fatigue- +  Weakness-  Delirium-  +       Unable to obtain/Limited due to: confusion      PHYSICAL EXAM:    Vital Signs Last 24 Hrs  T(C): 36.3 (03 Sep 2024 07:52), Max: 36.3 (02 Sep 2024 16:12)  T(F): 97.3 (03 Sep 2024 07:52), Max: 97.4 (02 Sep 2024 16:12)  HR: 62 (03 Sep 2024 07:52) (62 - 82)  BP: 102/67 (03 Sep 2024 07:52) (102/67 - 134/68)  BP(mean): --  RR: 18 (03 Sep 2024 07:52) (18 - 19)  SpO2: 96% (03 Sep 2024 07:52) (96% - 97%)    Parameters below as of 03 Sep 2024 07:52  Patient On (Oxygen Delivery Method): room air      Daily     Daily     PPSV2:30   %  FAST:    General: calm in NAD  Mental Status: awake alert oriented x1-2  HEENT: eomi, perrl  Lungs: ctabl b/l bs  Cardiac: s1s2 no mgr  GI: soft nontender +BS  : voids  Ext: moves all 4 extremities spontaneously  Neuro: confusion, follows simple commands a+Ox1 to self    LABS:                        11.4   9.33  )-----------( 366      ( 03 Sep 2024 08:38 )             34.1     09-03    138  |  104  |  15  ----------------------------<  83  3.5   |  26  |  0.88    Ca    9.2      03 Sep 2024 08:38        Albumin: Albumin: 3.0 g/dL (08-30 @ 15:05)      Allergies    No Known Allergies    Intolerances      MEDICATIONS  (STANDING):  amLODIPine   Tablet 5 milliGRAM(s) Oral daily  azithromycin  IVPB 500 milliGRAM(s) IV Intermittent every 24 hours  enoxaparin Injectable 40 milliGRAM(s) SubCutaneous every 24 hours  mirtazapine 3.75 milliGRAM(s) Oral at bedtime  pantoprazole    Tablet 40 milliGRAM(s) Oral before breakfast  piperacillin/tazobactam IVPB.. 3.375 Gram(s) IV Intermittent every 8 hours  rosuvastatin 5 milliGRAM(s) Oral at bedtime    MEDICATIONS  (PRN):  meclizine 12.5 milliGRAM(s) Oral two times a day PRN for dizziness  melatonin 3 milliGRAM(s) Oral at bedtime PRN Insomnia  ondansetron Injectable 4 milliGRAM(s) IV Push every 8 hours PRN Nausea and/or Vomiting      RADIOLOGY/ADDITIONAL STUDIES:

## 2024-09-04 ENCOUNTER — TRANSCRIPTION ENCOUNTER (OUTPATIENT)
Age: 84
End: 2024-09-04

## 2024-09-04 VITALS
OXYGEN SATURATION: 94 % | RESPIRATION RATE: 18 BRPM | HEART RATE: 66 BPM | DIASTOLIC BLOOD PRESSURE: 60 MMHG | SYSTOLIC BLOOD PRESSURE: 114 MMHG | TEMPERATURE: 98 F

## 2024-09-04 LAB
ANION GAP SERPL CALC-SCNC: 7 MMOL/L — SIGNIFICANT CHANGE UP (ref 5–17)
BUN SERPL-MCNC: 10 MG/DL — SIGNIFICANT CHANGE UP (ref 7–23)
CALCIUM SERPL-MCNC: 9.5 MG/DL — SIGNIFICANT CHANGE UP (ref 8.5–10.1)
CHLORIDE SERPL-SCNC: 104 MMOL/L — SIGNIFICANT CHANGE UP (ref 96–108)
CO2 SERPL-SCNC: 25 MMOL/L — SIGNIFICANT CHANGE UP (ref 22–31)
CREAT SERPL-MCNC: 0.92 MG/DL — SIGNIFICANT CHANGE UP (ref 0.5–1.3)
EGFR: 62 ML/MIN/1.73M2 — SIGNIFICANT CHANGE UP
GLUCOSE SERPL-MCNC: 97 MG/DL — SIGNIFICANT CHANGE UP (ref 70–99)
HCT VFR BLD CALC: 38.3 % — SIGNIFICANT CHANGE UP (ref 34.5–45)
HGB BLD-MCNC: 12.7 G/DL — SIGNIFICANT CHANGE UP (ref 11.5–15.5)
MCHC RBC-ENTMCNC: 29.3 PG — SIGNIFICANT CHANGE UP (ref 27–34)
MCHC RBC-ENTMCNC: 33.2 GM/DL — SIGNIFICANT CHANGE UP (ref 32–36)
MCV RBC AUTO: 88.5 FL — SIGNIFICANT CHANGE UP (ref 80–100)
PLATELET # BLD AUTO: 406 K/UL — HIGH (ref 150–400)
POTASSIUM SERPL-MCNC: 3.6 MMOL/L — SIGNIFICANT CHANGE UP (ref 3.5–5.3)
POTASSIUM SERPL-SCNC: 3.6 MMOL/L — SIGNIFICANT CHANGE UP (ref 3.5–5.3)
RBC # BLD: 4.33 M/UL — SIGNIFICANT CHANGE UP (ref 3.8–5.2)
RBC # FLD: 13.1 % — SIGNIFICANT CHANGE UP (ref 10.3–14.5)
SODIUM SERPL-SCNC: 136 MMOL/L — SIGNIFICANT CHANGE UP (ref 135–145)
WBC # BLD: 10.67 K/UL — HIGH (ref 3.8–10.5)
WBC # FLD AUTO: 10.67 K/UL — HIGH (ref 3.8–10.5)

## 2024-09-04 PROCEDURE — 99233 SBSQ HOSP IP/OBS HIGH 50: CPT

## 2024-09-04 PROCEDURE — 99239 HOSP IP/OBS DSCHRG MGMT >30: CPT

## 2024-09-04 RX ORDER — NIFEDIPINE 60 MG/1
1 TABLET, FILM COATED, EXTENDED RELEASE ORAL
Refills: 0 | DISCHARGE

## 2024-09-04 RX ORDER — AMLODIPINE BESYLATE 10 MG/1
1 TABLET ORAL
Qty: 30 | Refills: 1
Start: 2024-09-04 | End: 2024-11-02

## 2024-09-04 RX ORDER — MIRTAZAPINE 30 MG
0.5 TABLET ORAL
Qty: 15 | Refills: 0
Start: 2024-09-04 | End: 2024-10-03

## 2024-09-04 RX ORDER — ESCITALOPRAM OXALATE 10 MG/1
1 TABLET ORAL
Refills: 0 | DISCHARGE

## 2024-09-04 RX ORDER — MECLIZINE HYDROCHLORIDE 25 MG/1
1 TABLET ORAL
Qty: 60 | Refills: 0
Start: 2024-09-04 | End: 2024-10-03

## 2024-09-04 RX ADMIN — PIPERACILLIN SODIUM AND TAZOBACTAM SODIUM 25 GRAM(S): 3; .375 INJECTION, POWDER, FOR SOLUTION INTRAVENOUS at 10:19

## 2024-09-04 RX ADMIN — ENOXAPARIN SODIUM 40 MILLIGRAM(S): 100 INJECTION SUBCUTANEOUS at 10:24

## 2024-09-04 RX ADMIN — Medication 40 MILLIGRAM(S): at 05:22

## 2024-09-04 RX ADMIN — PIPERACILLIN SODIUM AND TAZOBACTAM SODIUM 25 GRAM(S): 3; .375 INJECTION, POWDER, FOR SOLUTION INTRAVENOUS at 01:37

## 2024-09-04 RX ADMIN — AMLODIPINE BESYLATE 5 MILLIGRAM(S): 10 TABLET ORAL at 10:24

## 2024-09-04 NOTE — DISCHARGE NOTE PROVIDER - ATTENDING DISCHARGE PHYSICAL EXAMINATION:
Gen: No acute distress, but lethargic   HEENT:  pupils equal and reactive  NECK:   supple  CV:  +S1, +S2, regular rate rhythm, no murmurs  RESP:   lungs clear to auscultation bilaterally, no wheezing  GI:  abdomen soft, non-tender, non-distended, normal BS  MSK:   normal muscle tone  EXT:  no clubbing, no cyanosis, no edema  NEURO: no focal neurological deficits, baseline depression

## 2024-09-04 NOTE — PROGRESS NOTE ADULT - SUBJECTIVE AND OBJECTIVE BOX
9/3- no complaints feels well, Na down to 126 yesterday, was up to 131 Sept 1      Covering Dr Randolph adams    Patient is a 84y Female with  who reports   no complaints overnight.  pt not eating    some nausea   + water intake         REVIEW OF SYSTEMS:    CONSTITUTIONAL: No weakness, fevers or chills  RESPIRATORY: No cough, wheezing, hemoptysis; No shortness of breath  CARDIOVASCULAR: No chest pain or palpitations  ABDOMEN: no abd pains, nausea or vomiting  GENITOURINARY: No dysuria, frequency or hematuria  All other review of systems is negative unless indicated above.    Allergies    No Known Allergies    Intolerances        MEDICATIONS  (STANDING):  amLODIPine   Tablet 5 milliGRAM(s) Oral daily  enoxaparin Injectable 40 milliGRAM(s) SubCutaneous every 24 hours  mirtazapine 3.75 milliGRAM(s) Oral at bedtime  pantoprazole    Tablet 40 milliGRAM(s) Oral before breakfast  piperacillin/tazobactam IVPB.. 3.375 Gram(s) IV Intermittent every 8 hours  rosuvastatin 5 milliGRAM(s) Oral at bedtime    MEDICATIONS  (PRN):  meclizine 12.5 milliGRAM(s) Oral two times a day PRN for dizziness  melatonin 3 milliGRAM(s) Oral at bedtime PRN Insomnia  ondansetron Injectable 4 milliGRAM(s) IV Push every 8 hours PRN Nausea and/or Vomiting  senna 2 Tablet(s) Oral at bedtime PRN Constipation      Vital Signs Last 24 Hrs  T(C): 36.5 (03 Sep 2024 16:05), Max: 36.5 (03 Sep 2024 16:05)  T(F): 97.7 (03 Sep 2024 16:05), Max: 97.7 (03 Sep 2024 16:05)  HR: 74 (03 Sep 2024 16:05) (62 - 82)  BP: 111/72 (03 Sep 2024 16:05) (102/67 - 134/68)  BP(mean): --  RR: 18 (03 Sep 2024 16:05) (18 - 18)  SpO2: 99% (03 Sep 2024 16:05) (96% - 99%)    Parameters below as of 03 Sep 2024 16:05  Patient On (Oxygen Delivery Method): room air      PHYSICAL EXAM:    Constitutional: NAD,   HEENT:  EOMI,  MMM  Neck: No JVD  Respiratory: CTAB  Cardiovascular: S1 and S2  Gastrointestinal: BS+, soft, NT/ND  Extremities: No peripheral edema  Neurological: A/O x 3, no focal deficits  : No Arguelles  Skin: No rashes  Dialysis Access: Not applicable    LABS:                          11.4   9.33  )-----------( 366      ( 03 Sep 2024 08:38 )             34.1                           12.7   9.15  )-----------( 319      ( 02 Sep 2024 07:56 )             37.4                         13.3   15.58 )-----------( 358      ( 01 Sep 2024 11:13 )             39.0     09-03    136  |  105  |  14  ----------------------------<  105<H>  3.8   |  25  |  0.90    Ca    9.4      03 Sep 2024 17:49          126    |  97     |  11     ----------------------------<  99        02 Sep 2024 07:56  3.6     |  23     |  0.98     131    |  99     |  7      ----------------------------<  110       01 Sep 2024 06:58  3.7     |  22     |  0.96     130    |  102    |  11     ----------------------------<  96        31 Aug 2024 06:54  4.3     |  22     |  0.95     Ca    9.1        02 Sep 2024 07:56  Ca    8.6        01 Sep 2024 06:58    Phos  2.6       30 Aug 2024 15:05      TPro  x      /  Alb  3.0    /  TBili  x      /        30 Aug 2024 15:05  DBili  x      /  AST  x      /  ALT  x      /  AlkPhos  x            Serum Osmo: Osmolality, Serum: 261 mosmol/kg *L* [280 - 301] (08-28 @ 07:51)    Serum Uric Acid: Uric Acid: 2.4 mg/dL (08-28 @ 06:55)      Urine Studies:  Urinalysis Basic - ( 02 Sep 2024 07:56 )    Color: x / Appearance: x / SG: x / pH: x  Gluc: 99 mg/dL / Ketone: x  / Bili: x / Urobili: x   Blood: x / Protein: x / Nitrite: x   Leuk Esterase: x / RBC: x / WBC x   Sq Epi: x / Non Sq Epi: x / Bacteria: x        Urine chemistry:   Urine Na: Sodium, Random Urine: <20 mmol/L (08-28 @ 00:02)    Urine Creatinine:   Urine Protein/Cr ratio:  Urine K:   Urine Osm: Osmolality, Random Urine: 93 mosm/Kg (08-28 @ 00:02)        RADIOLOGY & ADDITIONAL STUDIES:              
HOSPITALIST ATTENDING PROGRESS NOTE    Chart and meds reviewed.      Subjective: Patient seen and examined. Resting comfortably. Patient appears withdrawn, reports generalized pain, discussed with son. . Continue to monitor off IVF. Continue to work with PT, plan for DANIEL.       Additional results/Imaging, I have personally reviewed:    LABS:                            13.9   9.73  )-----------( 377      ( 31 Aug 2024 06:54 )             40.3     08-31    130<L>  |  102  |  11  ----------------------------<  96  4.3   |  22  |  0.95    Ca    9.2      31 Aug 2024 06:54  Phos  2.6     08-30    TPro  x   /  Alb  3.0<L>  /  TBili  x   /  DBili  x   /  AST  x   /  ALT  x   /  AlkPhos  x   08-30        LIVER FUNCTIONS - ( 30 Aug 2024 15:05 )  Alb: 3.0 g/dL / Pro: x     / ALK PHOS: x     / ALT: x     / AST: x     / GGT: x             Urinalysis Basic - ( 31 Aug 2024 06:54 )    Color: x / Appearance: x / SG: x / pH: x  Gluc: 96 mg/dL / Ketone: x  / Bili: x / Urobili: x   Blood: x / Protein: x / Nitrite: x   Leuk Esterase: x / RBC: x / WBC x   Sq Epi: x / Non Sq Epi: x / Bacteria: x              All other systems reviewed and found to be negative with the exception of what has been described above.    MEDICATIONS  (STANDING):  amLODIPine   Tablet 5 milliGRAM(s) Oral daily  enoxaparin Injectable 40 milliGRAM(s) SubCutaneous every 24 hours  mirtazapine 3.75 milliGRAM(s) Oral at bedtime  pantoprazole    Tablet 40 milliGRAM(s) Oral before breakfast  piperacillin/tazobactam IVPB.. 3.375 Gram(s) IV Intermittent every 8 hours  rosuvastatin 5 milliGRAM(s) Oral at bedtime    MEDICATIONS  (PRN):  meclizine 12.5 milliGRAM(s) Oral two times a day PRN for dizziness  ondansetron Injectable 4 milliGRAM(s) IV Push every 8 hours PRN Nausea and/or Vomiting      VITALS:  T(F): 97.8 (08-31-24 @ 08:44), Max: 98.1 (08-30-24 @ 14:41)  HR: 61 (08-31-24 @ 08:44) (61 - 68)  BP: 146/80 (08-31-24 @ 08:44) (100/63 - 146/80)  RR: 18 (08-31-24 @ 08:44) (17 - 19)  SpO2: 98% (08-31-24 @ 08:44) (96% - 100%)  Wt(kg): --    I&O's Summary    30 Aug 2024 07:01  -  31 Aug 2024 07:00  --------------------------------------------------------  IN: 1113 mL / OUT: 550 mL / NET: 563 mL        CAPILLARY BLOOD GLUCOSE          PHYSICAL EXAM:  Gen: No acute distress  awake, and oriented but lethargic but arousable   HEENT:  pupils equal and reactive, EOMI,   NECK:   supple, no carotid bruits, No JVD  CV:  +S1, +S2, regular rate rhythm, no murmurs or rubs  RESP:   lungs clear to auscultation bilaterally, no wheezing, rales, rhonchi, good air entry bilaterally  GI:  abdomen soft, non-tender, non-distended, normal BS, no bruits, no abdominal masses, no palpable masses  MSK:   normal muscle tone, no atrophy, no rigidity, no contractions  EXT:  no clubbing, no cyanosis, no edema, no calf pain, swelling or erythema  VASCULAR:  pulses equal and symmetric in the upper and lower extremities  NEURO:  AAOX3, no focal neurological deficits,  SKIN:  no ulcers, lesions or rashes    CULTURES:      Telemetry, personally reviewed
HOSPITALIST ATTENDING PROGRESS NOTE    Chart and meds reviewed.      Subjective: Patient seen and examined. resting comfortably. Sodium 138, DC NS , will repeat BMP. No changes in neurological or mental status.       Additional results/Imaging, I have personally reviewed:    LABS:                            11.4   9.33  )-----------( 366      ( 03 Sep 2024 08:38 )             34.1     09-03    138  |  104  |  15  ----------------------------<  83  3.5   |  26  |  0.88    Ca    9.2      03 Sep 2024 08:38              Urinalysis Basic - ( 03 Sep 2024 08:38 )    Color: x / Appearance: x / SG: x / pH: x  Gluc: 83 mg/dL / Ketone: x  / Bili: x / Urobili: x   Blood: x / Protein: x / Nitrite: x   Leuk Esterase: x / RBC: x / WBC x   Sq Epi: x / Non Sq Epi: x / Bacteria: x              All other systems reviewed and found to be negative with the exception of what has been described above.    MEDICATIONS  (STANDING):  amLODIPine   Tablet 5 milliGRAM(s) Oral daily  azithromycin  IVPB 500 milliGRAM(s) IV Intermittent every 24 hours  enoxaparin Injectable 40 milliGRAM(s) SubCutaneous every 24 hours  mirtazapine 3.75 milliGRAM(s) Oral at bedtime  pantoprazole    Tablet 40 milliGRAM(s) Oral before breakfast  piperacillin/tazobactam IVPB.. 3.375 Gram(s) IV Intermittent every 8 hours  rosuvastatin 5 milliGRAM(s) Oral at bedtime    MEDICATIONS  (PRN):  meclizine 12.5 milliGRAM(s) Oral two times a day PRN for dizziness  melatonin 3 milliGRAM(s) Oral at bedtime PRN Insomnia  ondansetron Injectable 4 milliGRAM(s) IV Push every 8 hours PRN Nausea and/or Vomiting  senna 2 Tablet(s) Oral at bedtime PRN Constipation      VITALS:  T(F): 97.3 (09-03-24 @ 07:52), Max: 97.4 (09-02-24 @ 16:12)  HR: 62 (09-03-24 @ 07:52) (62 - 82)  BP: 102/67 (09-03-24 @ 07:52) (102/67 - 134/68)  RR: 18 (09-03-24 @ 07:52) (18 - 19)  SpO2: 96% (09-03-24 @ 07:52) (96% - 97%)  Wt(kg): --    I&O's Summary      CAPILLARY BLOOD GLUCOSE          PHYSICAL EXAM:  Gen: No acute distress  awake, and oriented but lethargic but arousable   HEENT:  pupils equal and reactive, EOMI,   NECK:   supple, no carotid bruits, No JVD  CV:  +S1, +S2, regular rate rhythm, no murmurs or rubs  RESP:   lungs clear to auscultation bilaterally, no wheezing, rales, rhonchi, good air entry bilaterally  GI:  abdomen soft, non-tender, non-distended, normal BS, no bruits, no abdominal masses, no palpable masses  MSK:   normal muscle tone, no atrophy, no rigidity, no contractions  EXT:  no clubbing, no cyanosis, no edema, no calf pain, swelling or erythema  VASCULAR:  pulses equal and symmetric in the upper and lower extremities  NEURO:  AAOX3, no focal neurological deficits,  SKIN:  no ulcers, lesions or rashes    CULTURES:      Telemetry, personally reviewed
Patient is a 84y old  Female who presents with a chief complaint of hyponatremia (28 Aug 2024 14:01)      BRIEF HOSPITAL COURSE:  84 year old female  PMHx of memory disorder, undifferentiated somatoform disorder, Crohn's disease, osteoporosis, MDD, IBD, GERD s/p recent EGD. Sent in from Gi doctor for low sodium on blood work.     Events last 24 hours: Na overcorrected quite a bit, given free water and DDAVP and sodium came back down to appropriate range. Patient seen and examined at the bedside. Awake, alert, lying in bed. Making some urine now.     PAST MEDICAL & SURGICAL HISTORY:  Undifferentiated somatoform disorder      Memory disorder      Major depressive disorder      IBD (inflammatory bowel disease)      Esophageal reflux      Osteoporosis      H/O Crohn's disease      S/P tonsillectomy          Review of Systems:  CONSTITUTIONAL: No fever, chills, or fatigue  EYES: No eye pain, visual disturbances, or discharge  ENMT:  No difficulty hearing, tinnitus, vertigo; No sinus or throat pain  NECK: No pain or stiffness  RESPIRATORY: No cough, wheezing, chills or hemoptysis; No shortness of breath  CARDIOVASCULAR: No chest pain, palpitations, dizziness, or leg swelling  GASTROINTESTINAL: No abdominal or epigastric pain. No nausea, vomiting, or hematemesis; No diarrhea or constipation. No melena or hematochezia.  GENITOURINARY: No dysuria, frequency, hematuria, or incontinence  NEUROLOGICAL: No headaches, memory loss, loss of strength, numbness, or tremors  SKIN: No itching, burning, rashes, or lesions   MUSCULOSKELETAL: No joint pain or swelling; No muscle, back, or extremity pain  PSYCHIATRIC: No depression, anxiety, mood swings, or difficulty sleeping      Medications:  piperacillin/tazobactam IVPB.. 3.375 Gram(s) IV Intermittent every 8 hours    amLODIPine   Tablet 5 milliGRAM(s) Oral daily      meclizine 12.5 milliGRAM(s) Oral two times a day PRN  ondansetron Injectable 4 milliGRAM(s) IV Push every 8 hours PRN      enoxaparin Injectable 40 milliGRAM(s) SubCutaneous every 24 hours    pantoprazole  Injectable 40 milliGRAM(s) IV Push daily      rosuvastatin 5 milliGRAM(s) Oral at bedtime                  ICU Vital Signs Last 24 Hrs  T(C): 36.8 (29 Aug 2024 00:00), Max: 37.1 (28 Aug 2024 08:00)  T(F): 98.2 (29 Aug 2024 00:00), Max: 98.8 (28 Aug 2024 08:00)  HR: 68 (29 Aug 2024 01:00) (59 - 77)  BP: 159/90 (29 Aug 2024 01:00) (102/60 - 171/78)  BP(mean): 111 (29 Aug 2024 01:00) (74 - 124)  ABP: --  ABP(mean): --  RR: 23 (29 Aug 2024 01:00) (17 - 25)  SpO2: 95% (29 Aug 2024 01:00) (92% - 100%)    O2 Parameters below as of 28 Aug 2024 20:00  Patient On (Oxygen Delivery Method): room air                I&O's Detail    27 Aug 2024 07:01  -  28 Aug 2024 07:00  --------------------------------------------------------  IN:    IV PiggyBack: 350 mL    sodium chloride 0.9%: 228.5 mL  Total IN: 578.5 mL    OUT:    Indwelling Catheter - Urethral (mL): 1030 mL    Oral Fluid: 0 mL    Voided (mL): 500 mL  Total OUT: 1530 mL    Total NET: -951.5 mL      28 Aug 2024 07:01  -  29 Aug 2024 01:17  --------------------------------------------------------  IN:    dextrose 5%: 522 mL    IV PiggyBack: 500 mL    Oral Fluid: 425 mL  Total IN: 1447 mL    OUT:    Indwelling Catheter - Urethral (mL): 427 mL    Voided (mL): 475 mL  Total OUT: 902 mL    Total NET: 545 mL            LABS:                        14.0   17.62 )-----------( 433      ( 28 Aug 2024 06:55 )             40.0     08-28    124<L>  |  93<L>  |  5<L>  ----------------------------<  110<H>  3.3<L>   |  22  |  0.66    Ca    7.9<L>      28 Aug 2024 22:38  Mg     2.5     08-28    TPro  7.1  /  Alb  3.3  /  TBili  0.6  /  DBili  x   /  AST  12<L>  /  ALT  13  /  AlkPhos  97  08-28          CAPILLARY BLOOD GLUCOSE        PT/INR - ( 28 Aug 2024 06:55 )   PT: 11.6 sec;   INR: 1.03 ratio           Urinalysis Basic - ( 28 Aug 2024 22:38 )    Color: x / Appearance: x / SG: x / pH: x  Gluc: 110 mg/dL / Ketone: x  / Bili: x / Urobili: x   Blood: x / Protein: x / Nitrite: x   Leuk Esterase: x / RBC: x / WBC x   Sq Epi: x / Non Sq Epi: x / Bacteria: x      CULTURES:      Physical Examination:    General:   Alert, oriented, interactive, nonfocal    HEENT: Pupils equal, reactive to light.  Symmetric.    PULM: Clear to auscultation bilaterally, no significant sputum production    CVS: Regular rate and rhythm    ABD: Soft, nondistended, nontender    EXT: mild LE edema     SKIN: Warm     " Time spent on this patient encounter, which includes documenting this note in the electronic medical record, was 42 minutes including assessing the presenting problems with associated risks, reviewing the medical record to prepare for the encounter, and meeting face to face with patient to obtain additional history. I have also performed an appropriate physical exam, made interventions listed and ordered and interpreted appropriate diagnostic studies as documented. To improve communication and patient saftey, I have coordinated care with the multidisciplinary team including the bedside nurse, appropriate attending of record and consultants as needed. "    Date of Entry of this note is equal to the date of services rendered
Chief complaints.   sent to ED due to Hyponatremia  8/29- flat affect + nausea events noted  HPI: hx obtained from pt's son at bedside.  85 yo woman with PMHX of Crohn's disease, memory disorder, undifferentiated somatoform disorder, IBD and GERD post recent EGD due to nausea  for several weeks.  Sent to ED per GI due to hyponatremia.  Pt's son reports pt has been feeling poorly and weak and had no po intake for several days.  Pt c/o pain on swallowing and difficulty with food.  No significant abnormality on EGD.    In ED, Serum Na of 115 with exam c/w volume depletion--corrected to 131 with NS, now post Ddavp.  Noted with low urine NA and Osm.    HH admission in 2/2024 with acute hyponatremia post starting diuretics--resolved off meds.      PMHX & PSHX  --Hyponatremia ( 2/2024 post start of diuretics)  --Crohs's disease.  --IBD  --Memory disorder  --Undifferentiated somatoform disorder.    Home Medications:   * Patient Currently Takes Medications as of 27-Aug-2024 23:26 documented in Structured Notes  · 	NIFEdipine 30 mg oral tablet, extended release: Last Dose Taken:  , 1 tab(s) orally once a day  · 	metoclopramide 5 mg oral tablet: Last Dose Taken:  , 1 tab(s) orally 1 to 3 times a day as needed for  nausea  · 	pantoprazole 40 mg oral delayed release tablet: Last Dose Taken:  , 1 tab(s) orally 2 times a day  · 	Colazal 750 mg oral capsule: Last Dose Taken: 25-Aug-2024 , 2 cap(s) orally 2 times a day DC'd by pt's GI 2 days ago to prevent extra dizziness/nausea  · 	escitalopram 5 mg oral tablet: Last Dose Taken:  , 1 tab(s) orally once a day for chronic dizziness  · 	Humira 40 mg/0.8 mL subcutaneous kit: Last Dose Taken:  , 40 milligram(s) subcutaneously every 2 weeks  · 	Crestor 5 mg oral tablet: Last Dose Taken:  , 1 tab(s) orally once a day    FAMILY HISTORY: N/C    SOCIAL HISTORY : No hx of smoking or ETOH    Allergies :  No Known Allergies    REVIEW OF SYSTEMS :  Denies SOB  C/o upper chest pain  Difficulty swallowing    MEDICATIONS  (STANDING):  amLODIPine   Tablet 5 milliGRAM(s) Oral daily  enoxaparin Injectable 40 milliGRAM(s) SubCutaneous every 24 hours  pantoprazole  Injectable 40 milliGRAM(s) IV Push daily  piperacillin/tazobactam IVPB.. 3.375 Gram(s) IV Intermittent every 8 hours  rosuvastatin 5 milliGRAM(s) Oral at bedtime    MEDICATIONS  (PRN):  meclizine 12.5 milliGRAM(s) Oral two times a day PRN for dizziness  ondansetron Injectable 4 milliGRAM(s) IV Push every 8 hours PRN Nausea and/or Vomiting      Vital Signs Last 24 Hrs  T(C): 36.5 (29 Aug 2024 05:17), Max: 37.1 (28 Aug 2024 12:00)  T(F): 97.7 (29 Aug 2024 05:17), Max: 98.8 (28 Aug 2024 12:00)  HR: 56 (29 Aug 2024 09:00) (50 - 94)  BP: 141/67 (29 Aug 2024 09:00) (94/48 - 171/78)  BP(mean): 89 (29 Aug 2024 09:00) (62 - 124)  RR: 16 (29 Aug 2024 09:00) (16 - 26)  SpO2: 98% (29 Aug 2024 09:00) (88% - 100%)    Parameters below as of 29 Aug 2024 08:00  Patient On (Oxygen Delivery Method): room air    28 Aug 2024 07:01  -  29 Aug 2024 07:00  --------------------------------------------------------  IN:    dextrose 5%: 522 mL    IV PiggyBack: 800 mL    Oral Fluid: 425 mL  Total IN: 1747 mL    OUT:    Indwelling Catheter - Urethral (mL): 427 mL    Voided (mL): 850 mL  Total OUT: 1277 mL    Total NET: 470 mL        PHYSICAL EXAM:  GEN: no distress  HEENT: WNL  NECK : supple  CV: S1S2 RRR  LUNGS: clear to aus  ABD: soft  EXT: no edema    LABS:                          13.0   12.73 )-----------( 354      ( 29 Aug 2024 05:35 )             36.7                           14.0   17.62 )-----------( 433      ( 28 Aug 2024 06:55 )             40.0     08-29    122<L>  |  92<L>  |  5<L>  ----------------------------<  91  3.7   |  22  |  0.66    Ca    8.2<L>      29 Aug 2024 05:35  Mg     2.5     08-28    TPro  7.1  /  Alb  3.3  /  TBili  0.6  /  DBili  x   /  AST  12<L>  /  ALT  13  /  AlkPhos  97  08-28 08-28    131<L>  |  99  |  6<L>  ----------------------------<  140<H>  3.3<L>   |  25  |  0.73    Ca    8.3<L>      28 Aug 2024 11:40  Mg     2.5     08-28    TPro  7.1  /  Alb  3.3  /  TBili  0.6  /  DBili  x   /  AST  12<L>  /  ALT  13  /  AlkPhos  97  08-28    PT/INR - ( 28 Aug 2024 06:55 )   PT: 11.6 sec;   INR: 1.03 ratio           Urinalysis Basic - ( 28 Aug 2024 11:40 )    Color: x / Appearance: x / SG: x / pH: x  Gluc: 140 mg/dL / Ketone: x  / Bili: x / Urobili: x   Blood: x / Protein: x / Nitrite: x   Leuk Esterase: x / RBC: x / WBC x   Sq Epi: x / Non Sq Epi: x / Bacteria: x      Magnesium: 2.5 mg/dL (08-28 @ 06:55)  Magnesium: 2.6 mg/dL (08-27 @ 19:26)      
Chief complaints.   sent to ED due to Hyponatremia  8/30- no new events vvs pts brother at bedside case discussed  8/29- flat affect + nausea events noted  HPI: hx obtained from pt's son at bedside.  83 yo woman with PMHX of Crohn's disease, memory disorder, undifferentiated somatoform disorder, IBD and GERD post recent EGD due to nausea  for several weeks.  Sent to ED per GI due to hyponatremia.  Pt's son reports pt has been feeling poorly and weak and had no po intake for several days.  Pt c/o pain on swallowing and difficulty with food.  No significant abnormality on EGD.    In ED, Serum Na of 115 with exam c/w volume depletion--corrected to 131 with NS, now post Ddavp.  Noted with low urine NA and Osm.    HH admission in 2/2024 with acute hyponatremia post starting diuretics--resolved off meds.      PMHX & PSHX  --Hyponatremia ( 2/2024 post start of diuretics)  --Crohs's disease.  --IBD  --Memory disorder  --Undifferentiated somatoform disorder.    Home Medications:   * Patient Currently Takes Medications as of 27-Aug-2024 23:26 documented in Structured Notes  · 	NIFEdipine 30 mg oral tablet, extended release: Last Dose Taken:  , 1 tab(s) orally once a day  · 	metoclopramide 5 mg oral tablet: Last Dose Taken:  , 1 tab(s) orally 1 to 3 times a day as needed for  nausea  · 	pantoprazole 40 mg oral delayed release tablet: Last Dose Taken:  , 1 tab(s) orally 2 times a day  · 	Colazal 750 mg oral capsule: Last Dose Taken: 25-Aug-2024 , 2 cap(s) orally 2 times a day DC'd by pt's GI 2 days ago to prevent extra dizziness/nausea  · 	escitalopram 5 mg oral tablet: Last Dose Taken:  , 1 tab(s) orally once a day for chronic dizziness  · 	Humira 40 mg/0.8 mL subcutaneous kit: Last Dose Taken:  , 40 milligram(s) subcutaneously every 2 weeks  · 	Crestor 5 mg oral tablet: Last Dose Taken:  , 1 tab(s) orally once a day    FAMILY HISTORY: N/C    SOCIAL HISTORY : No hx of smoking or ETOH    Allergies :  No Known Allergies    REVIEW OF SYSTEMS :  Denies SOB  C/o upper chest pain  Difficulty swallowing    MEDICATIONS  (STANDING):  amLODIPine   Tablet 5 milliGRAM(s) Oral daily  enoxaparin Injectable 40 milliGRAM(s) SubCutaneous every 24 hours  pantoprazole  Injectable 40 milliGRAM(s) IV Push daily  piperacillin/tazobactam IVPB.. 3.375 Gram(s) IV Intermittent every 8 hours  rosuvastatin 5 milliGRAM(s) Oral at bedtime    MEDICATIONS  (PRN):  meclizine 12.5 milliGRAM(s) Oral two times a day PRN for dizziness  ondansetron Injectable 4 milliGRAM(s) IV Push every 8 hours PRN Nausea and/or Vomiting      Vital Signs Last 24 Hrs  T(C): 36.3 (30 Aug 2024 16:00), Max: 36.8 (30 Aug 2024 04:00)  T(F): 97.4 (30 Aug 2024 16:00), Max: 98.3 (30 Aug 2024 08:00)  HR: 66 (30 Aug 2024 16:00) (61 - 77)  BP: 130/62 (30 Aug 2024 16:00) (100/63 - 158/91)  BP(mean): 75 (30 Aug 2024 14:00) (75 - 111)  RR: 18 (30 Aug 2024 16:00) (14 - 21)  SpO2: 100% (30 Aug 2024 16:00) (96% - 100%)    Parameters below as of 30 Aug 2024 16:00  Patient On (Oxygen Delivery Method): room air      PHYSICAL EXAM:  GEN: no distress  HEENT: WNL  NECK : supple  CV: S1S2 RRR  LUNGS: clear to aus  ABD: soft  EXT: no edema    LABS:                          12.7   12.56 )-----------( 366      ( 30 Aug 2024 05:01 )             35.3                           13.0   12.73 )-----------( 354      ( 29 Aug 2024 05:35 )             36.7                           14.0   17.62 )-----------( 433      ( 28 Aug 2024 06:55 )             40.0       125    |  99     |  10     ----------------------------<  93        30 Aug 2024 15:05  4.7     |  22     |  0.87     121    |  93     |  4      ----------------------------<  86        30 Aug 2024 05:01  3.4     |  20     |  0.54     121    |  90     |  5      ----------------------------<  98        29 Aug 2024 22:08  3.6     |  22     |  0.60     Ca    9.1        30 Aug 2024 15:05  Ca    8.2        30 Aug 2024 05:01  Ca    8.4        29 Aug 2024 22:08    Phos  2.6       30 Aug 2024 15:05    Mg     2.5       28 Aug 2024 06:55  Mg     2.6       27 Aug 2024 19:26        08-29    122<L>  |  92<L>  |  5<L>  ----------------------------<  91  3.7   |  22  |  0.66    Ca    8.2<L>      29 Aug 2024 05:35  Mg     2.5     08-28    TPro  7.1  /  Alb  3.3  /  TBili  0.6  /  DBili  x   /  AST  12<L>  /  ALT  13  /  AlkPhos  97  08-28 08-28    131<L>  |  99  |  6<L>  ----------------------------<  140<H>  3.3<L>   |  25  |  0.73    Ca    8.3<L>      28 Aug 2024 11:40  Mg     2.5     08-28    TPro  7.1  /  Alb  3.3  /  TBili  0.6  /  DBili  x   /  AST  12<L>  /  ALT  13  /  AlkPhos  97  08-28    PT/INR - ( 28 Aug 2024 06:55 )   PT: 11.6 sec;   INR: 1.03 ratio           Urinalysis Basic - ( 28 Aug 2024 11:40 )    Color: x / Appearance: x / SG: x / pH: x  Gluc: 140 mg/dL / Ketone: x  / Bili: x / Urobili: x   Blood: x / Protein: x / Nitrite: x   Leuk Esterase: x / RBC: x / WBC x   Sq Epi: x / Non Sq Epi: x / Bacteria: x      Magnesium: 2.5 mg/dL (08-28 @ 06:55)  Magnesium: 2.6 mg/dL (08-27 @ 19:26)      
Patient is a 84y Female with  who reports   no complaints overnight.    REVIEW OF SYSTEMS:    CONSTITUTIONAL: No weakness, fevers or chills  RESPIRATORY: No cough, wheezing, hemoptysis; No shortness of breath  CARDIOVASCULAR: No chest pain or palpitations  ABDOMEN: no abd pains, nausea or vomiting  GENITOURINARY: No dysuria, frequency or hematuria  All other review of systems is negative unless indicated above.    Allergies    No Known Allergies    Intolerances        MEDICATIONS  (STANDING):  amLODIPine   Tablet 5 milliGRAM(s) Oral daily  azithromycin  IVPB 500 milliGRAM(s) IV Intermittent every 24 hours  enoxaparin Injectable 40 milliGRAM(s) SubCutaneous every 24 hours  mirtazapine 3.75 milliGRAM(s) Oral at bedtime  pantoprazole    Tablet 40 milliGRAM(s) Oral before breakfast  piperacillin/tazobactam IVPB.. 3.375 Gram(s) IV Intermittent every 8 hours  rosuvastatin 5 milliGRAM(s) Oral at bedtime      Vitals:  T(F): 97.6 (24), Max: 100.6 (24)  HR: 71 (24) (71 - 72)  BP: 100/57 (24) (100/57 - 104/62)  RR: 18 (24)  SpO2: 97% (24)    I and O's:          PHYSICAL EXAM:    Constitutional: NAD,   HEENT:  EOMI,  MMM  Neck: No JVD  Respiratory: CTAB  Cardiovascular: S1 and S2  Gastrointestinal: BS+, soft, NT/ND  Extremities: No peripheral edema  Neurological: A/O x 3, no focal deficits  : No Arguelles  Skin: No rashes  Dialysis Access: Not applicable    LABS:                        13.3   15.58 )-----------( 358      ( 01 Sep 2024 11:13 )             39.0                         13.5   20.56 )-----------( 416      ( 01 Sep 2024 06:58 )             39.3       131    |  99     |  7      ----------------------------<  110       01 Sep 2024 06:58  3.7     |  22     |  0.96     130    |  102    |  11     ----------------------------<  96        31 Aug 2024 06:54  4.3     |  22     |  0.95     128    |  101    |  14     ----------------------------<  99        30 Aug 2024 19:51  5.3     |  22     |  0.88     Ca    8.6        01 Sep 2024 06:58  Ca    9.2        31 Aug 2024 06:54    Phos  2.6       30 Aug 2024 15:05      TPro  x      /  Alb  3.0    /  TBili  x      /        30 Aug 2024 15:05  DBili  x      /  AST  x      /  ALT  x      /  AlkPhos  x            Serum Osmo: Osmolality, Serum: 261 mosmol/kg *L* [280 - 301] ( @ 07:51)    Serum Uric Acid: Uric Acid: 2.4 mg/dL ( @ 06:55)      Urine Studies:  Urinalysis Basic - ( 01 Sep 2024 14:47 )    Color: Yellow / Appearance: Cloudy / S.007 / pH: x  Gluc: x / Ketone: Negative mg/dL  / Bili: Negative / Urobili: 0.2 mg/dL   Blood: x / Protein: Negative mg/dL / Nitrite: Negative   Leuk Esterase: Large / RBC: 1 /HPF / WBC 60 /HPF   Sq Epi: x / Non Sq Epi: 4 /HPF / Bacteria: Negative /HPF        Urine chemistry:   Urine Na: Sodium, Random Urine: <20 mmol/L ( @ 00:02)    Urine Creatinine:   Urine Protein/Cr ratio:  Urine K:   Urine Osm: Osmolality, Random Urine: 93 mosm/Kg ( @ 00:02)        RADIOLOGY & ADDITIONAL STUDIES:              
Coral Schmitz etl al    Patient is a 84y Female with  who reports   no complaints overnight.  pt not eating    some nausea   + water intake         REVIEW OF SYSTEMS:    CONSTITUTIONAL: No weakness, fevers or chills  RESPIRATORY: No cough, wheezing, hemoptysis; No shortness of breath  CARDIOVASCULAR: No chest pain or palpitations  ABDOMEN: no abd pains, nausea or vomiting  GENITOURINARY: No dysuria, frequency or hematuria  All other review of systems is negative unless indicated above.    Allergies    No Known Allergies    Intolerances        MEDICATIONS  (STANDING):  amLODIPine   Tablet 5 milliGRAM(s) Oral daily  azithromycin  IVPB 500 milliGRAM(s) IV Intermittent every 24 hours  enoxaparin Injectable 40 milliGRAM(s) SubCutaneous every 24 hours  mirtazapine 3.75 milliGRAM(s) Oral at bedtime  pantoprazole    Tablet 40 milliGRAM(s) Oral before breakfast  piperacillin/tazobactam IVPB.. 3.375 Gram(s) IV Intermittent every 8 hours  rosuvastatin 5 milliGRAM(s) Oral at bedtime  sodium chloride 0.9%. 1000 milliLiter(s) (75 mL/Hr) IV Continuous <Continuous>      Vitals:  T(F): 97.4 (09-02-24), Max: 98.2 (09-02-24)  HR: 82 (09-02-24) (57 - 82)  BP: 134/68 (09-02-24) (113/60 - 151/90)  RR: 18 (09-02-24)  SpO2: 97% (09-02-24)    I and O's:          PHYSICAL EXAM:    Constitutional: NAD,   HEENT:  EOMI,  MMM  Neck: No JVD  Respiratory: CTAB  Cardiovascular: S1 and S2  Gastrointestinal: BS+, soft, NT/ND  Extremities: No peripheral edema  Neurological: A/O x 3, no focal deficits  : No Arguelles  Skin: No rashes  Dialysis Access: Not applicable    LABS:                        12.7   9.15  )-----------( 319      ( 02 Sep 2024 07:56 )             37.4                         13.3   15.58 )-----------( 358      ( 01 Sep 2024 11:13 )             39.0       126    |  97     |  11     ----------------------------<  99        02 Sep 2024 07:56  3.6     |  23     |  0.98     131    |  99     |  7      ----------------------------<  110       01 Sep 2024 06:58  3.7     |  22     |  0.96     130    |  102    |  11     ----------------------------<  96        31 Aug 2024 06:54  4.3     |  22     |  0.95     Ca    9.1        02 Sep 2024 07:56  Ca    8.6        01 Sep 2024 06:58    Phos  2.6       30 Aug 2024 15:05      TPro  x      /  Alb  3.0    /  TBili  x      /        30 Aug 2024 15:05  DBili  x      /  AST  x      /  ALT  x      /  AlkPhos  x            Serum Osmo: Osmolality, Serum: 261 mosmol/kg *L* [280 - 301] (08-28 @ 07:51)    Serum Uric Acid: Uric Acid: 2.4 mg/dL (08-28 @ 06:55)      Urine Studies:  Urinalysis Basic - ( 02 Sep 2024 07:56 )    Color: x / Appearance: x / SG: x / pH: x  Gluc: 99 mg/dL / Ketone: x  / Bili: x / Urobili: x   Blood: x / Protein: x / Nitrite: x   Leuk Esterase: x / RBC: x / WBC x   Sq Epi: x / Non Sq Epi: x / Bacteria: x        Urine chemistry:   Urine Na: Sodium, Random Urine: <20 mmol/L (08-28 @ 00:02)    Urine Creatinine:   Urine Protein/Cr ratio:  Urine K:   Urine Osm: Osmolality, Random Urine: 93 mosm/Kg (08-28 @ 00:02)        RADIOLOGY & ADDITIONAL STUDIES:              
Interval History:      Patient got 2 doses of ddavp , sodium 122, now increased 7 in 24 hours      still with nausea      urine culture with [id number] Gram negative rods    HPI:       84 year old female  PMHx of memory disorder, undifferentiated somatoform disorder, Crohn's disease,  GERD  hyponatremia in 2/24 felt secondary to triampterene and hctz  She presented  to ER with low sodium levels that were reported from her GI doctor based on blood work drawn at appointment  and was advised to bring patient to ED   She has been  c/o general malaise and fatigue with nausea and vomiting  for weeks .   In the ER she was confused  she had leukocytosis with WBC 19  possible UTI  sodium 115 on admission    This am complain ing of weakness, nausea,  expressing depressive thoughts  had ct in ED with no acute finding  CT head was negative  troponin negative  Had recent endoscopy whichwas negative    ROS nausea, difficulty swallowing,  slight abdominal pain, no sob, no fevers, no chills, no angina     MEDICATIONS  (STANDING):  amLODIPine   Tablet 5 milliGRAM(s) Oral daily  enoxaparin Injectable 40 milliGRAM(s) SubCutaneous every 24 hours  pantoprazole    Tablet 40 milliGRAM(s) Oral before breakfast  piperacillin/tazobactam IVPB.. 3.375 Gram(s) IV Intermittent every 8 hours  rosuvastatin 5 milliGRAM(s) Oral at bedtime    MEDICATIONS  (PRN):  meclizine 12.5 milliGRAM(s) Oral two times a day PRN for dizziness  ondansetron Injectable 4 milliGRAM(s) IV Push every 8 hours PRN Nausea and/or Vomiting    ICU Vital Signs Last 24 Hrs  T(C): 36.7 (29 Aug 2024 10:00), Max: 37.1 (28 Aug 2024 12:00)  T(F): 98 (29 Aug 2024 10:00), Max: 98.8 (28 Aug 2024 12:00)  HR: 52 (29 Aug 2024 10:00) (50 - 94)  BP: 104/57 (29 Aug 2024 10:00) (94/48 - 171/78)  BP(mean): 71 (29 Aug 2024 10:00) (62 - 124)  ABP: --  ABP(mean): --  RR: 18 (29 Aug 2024 10:00) (16 - 26)  SpO2: 93% (29 Aug 2024 10:00) (88% - 100%)    O2 Parameters below as of 29 Aug 2024 08:00  Patient On (Oxygen Delivery Method): room air    Physical Exam    General lethargic  Neuro awake, and oriented but lethargic  heent - nc/at  neck supple  lungs clear  cv rrr  abdomen soft, non tender  extremtieis no edema  skin no rashes    I&O's Summary    28 Aug 2024 07:01  -  29 Aug 2024 07:00  --------------------------------------------------------  IN: 1747 mL / OUT: 1277 mL / NET: 470 mL                        13.0   12.73 )-----------( 354      ( 29 Aug 2024 05:35 )             36.7       08-29    122<L>  |  92<L>  |  5<L>  ----------------------------<  91  3.7   |  22  |  0.66    Ca    8.2<L>      29 Aug 2024 05:35  Mg     2.5     08-28    TPro  7.1  /  Alb  3.3  /  TBili  0.6  /  DBili  x   /  AST  12<L>  /  ALT  13  /  AlkPhos  97  08-28    Urinalysis Basic - ( 29 Aug 2024 05:35 )    Color: x / Appearance: x / SG: x / pH: x  Gluc: 91 mg/dL / Ketone: x  / Bili: x / Urobili: x   Blood: x / Protein: x / Nitrite: x   Leuk Esterase: x / RBC: x / WBC x   Sq Epi: x / Non Sq Epi: x / Bacteria: x    DVT Prophylaxis:  Lovenox                                                               Advanced Directives: Full Code         Labs, Notes, Orders, radiologic studies reviewed and care coordinated with multidisciplinary team              
HOSPITALIST ATTENDING PROGRESS NOTE    Chart and meds reviewed.      Subjective: Patient seen and examined. AAOX3, reports pain is improved today, WBC back to normal limits. CXR without acute cardiopulmonary process. Complete course of IV Zosyn and IV azithromycin. Follow up urine and blood cultures.       Additional results/Imaging, I have personally reviewed:    LABS:                            12.7   9.15  )-----------( 319      ( 02 Sep 2024 07:56 )             37.4     09-02    126<L>  |  97  |  11  ----------------------------<  99  3.6   |  23  |  0.98    Ca    9.1      02 Sep 2024 07:56              Urinalysis Basic - ( 02 Sep 2024 07:56 )    Color: x / Appearance: x / SG: x / pH: x  Gluc: 99 mg/dL / Ketone: x  / Bili: x / Urobili: x   Blood: x / Protein: x / Nitrite: x   Leuk Esterase: x / RBC: x / WBC x   Sq Epi: x / Non Sq Epi: x / Bacteria: x              All other systems reviewed and found to be negative with the exception of what has been described above.    MEDICATIONS  (STANDING):  amLODIPine   Tablet 5 milliGRAM(s) Oral daily  azithromycin  IVPB 500 milliGRAM(s) IV Intermittent every 24 hours  enoxaparin Injectable 40 milliGRAM(s) SubCutaneous every 24 hours  mirtazapine 3.75 milliGRAM(s) Oral at bedtime  pantoprazole    Tablet 40 milliGRAM(s) Oral before breakfast  piperacillin/tazobactam IVPB.. 3.375 Gram(s) IV Intermittent every 8 hours  rosuvastatin 5 milliGRAM(s) Oral at bedtime  sodium chloride 0.9%. 1000 milliLiter(s) (75 mL/Hr) IV Continuous <Continuous>    MEDICATIONS  (PRN):  meclizine 12.5 milliGRAM(s) Oral two times a day PRN for dizziness  ondansetron Injectable 4 milliGRAM(s) IV Push every 8 hours PRN Nausea and/or Vomiting      VITALS:  T(F): 98.2 (09-02-24 @ 07:35), Max: 98.2 (09-02-24 @ 07:35)  HR: 57 (09-02-24 @ 07:35) (57 - 71)  BP: 151/90 (09-02-24 @ 07:35) (100/57 - 151/90)  RR: 19 (09-02-24 @ 07:35) (18 - 19)  SpO2: 98% (09-02-24 @ 07:35) (97% - 98%)  Wt(kg): --    I&O's Summary      CAPILLARY BLOOD GLUCOSE          PHYSICAL EXAM:  Gen: No acute distress  awake, and oriented but lethargic but arousable   HEENT:  pupils equal and reactive, EOMI,   NECK:   supple, no carotid bruits, No JVD  CV:  +S1, +S2, regular rate rhythm, no murmurs or rubs  RESP:   lungs clear to auscultation bilaterally, no wheezing, rales, rhonchi, good air entry bilaterally  GI:  abdomen soft, non-tender, non-distended, normal BS, no bruits, no abdominal masses, no palpable masses  MSK:   normal muscle tone, no atrophy, no rigidity, no contractions  EXT:  no clubbing, no cyanosis, no edema, no calf pain, swelling or erythema  VASCULAR:  pulses equal and symmetric in the upper and lower extremities  NEURO:  AAOX3, no focal neurological deficits,  SKIN:  no ulcers, lesions or rashes    CULTURES:      Telemetry, personally reviewed
Interval History:     sodium 128 this am corrected to quickly restarted on d5w     Patient complaining of nausea    HPI:      4 year old female  PMHx of memory disorder, undifferentiated somatoform disorder, Crohn's disease,  GERD  hyponatremia in 2/24 felt secondary to triampterene and hctz  She presented  to ER with low sodium levels that were reported from her GI doctor based on blood work drawn at appointment  and was advised to bring patient to ED   She has been  c/o general malaise and fatigue with nausea and vomiting  for weeks .   In the ER she was confused  she had leukocytosis with WBC 19  possible UTI    This am complainting of weakness, nausea,  had ct in ED with no acute finding  CT head was negative  troponin negative    ROS nausea, difficulty swallowing,  slight abdominal pain, no sob, no fevers, no chills, no angina    MEDICATIONS  (STANDING):  amLODIPine   Tablet 5 milliGRAM(s) Oral daily  dextrose 5%. 1000 milliLiter(s) (75 mL/Hr) IV Continuous <Continuous>  enoxaparin Injectable 40 milliGRAM(s) SubCutaneous every 24 hours  pantoprazole  Injectable 40 milliGRAM(s) IV Push daily  piperacillin/tazobactam IVPB.. 3.375 Gram(s) IV Intermittent every 8 hours  potassium chloride  10 mEq/100 mL IVPB 10 milliEquivalent(s) IV Intermittent every 1 hour  rosuvastatin 5 milliGRAM(s) Oral at bedtime    MEDICATIONS  (PRN):  meclizine 12.5 milliGRAM(s) Oral two times a day PRN for dizziness    Height (cm): 157.5 (08-28 @ 05:57)  Weight (kg): 57.8 (08-28 @ 01:00)  BMI (kg/m2): 23.3 (08-28 @ 05:57)    ICU Vital Signs Last 24 Hrs  T(C): 36.9 (28 Aug 2024 04:00), Max: 37.2 (28 Aug 2024 01:00)  T(F): 98.4 (28 Aug 2024 04:00), Max: 99 (28 Aug 2024 01:00)  HR: 61 (28 Aug 2024 09:00) (59 - 82)  BP: 115/78 (28 Aug 2024 09:00) (96/84 - 149/63)  BP(mean): 90 (28 Aug 2024 09:00) (74 - 99)  ABP: --  ABP(mean): --  RR: 19 (28 Aug 2024 09:00) (16 - 21)  SpO2: 93% (28 Aug 2024 09:00) (92% - 100%)    O2 Parameters below as of 28 Aug 2024 08:00  Patient On (Oxygen Delivery Method): room air    Physical Exam    General lethargic  Neuro awake, and oriented but lethargic  heent - nc/at  neck supple  lungs clear  cv rrr  abdomen soft, non tender  extremtieis no edema  skin no rashes    I&O's Summary    27 Aug 2024 07:01  -  28 Aug 2024 07:00  --------------------------------------------------------  IN: 578.5 mL / OUT: 1530 mL / NET: -951.5 mL                        14.0   17.62 )-----------( 433      ( 28 Aug 2024 06:55 )             40.0       08-28    129<L>  |  96  |  8   ----------------------------<  111<H>  3.1<L>   |  25  |  0.77    Ca    8.7      28 Aug 2024 06:55  Mg     2.5     08-28    TPro  7.1  /  Alb  3.3  /  TBili  0.6  /  DBili  x   /  AST  12<L>  /  ALT  13  /  AlkPhos  97  08-28    Urinalysis Basic - ( 28 Aug 2024 06:55 )    Color: x / Appearance: x / SG: x / pH: x  Gluc: 111 mg/dL / Ketone: x  / Bili: x / Urobili: x   Blood: x / Protein: x / Nitrite: x   Leuk Esterase: x / RBC: x / WBC x   Sq Epi: x / Non Sq Epi: x / Bacteria: x    DVT Prophylaxis:  Lovenox                                                               Advanced Directives: Full Code         Labs, Notes, Orders, radiologic studies reviewed and care coordinated with multidisciplinary team              
HOSPITALIST ATTENDING PROGRESS NOTE    Chart and meds reviewed.      Interval History:      Patient got 2 doses of ddavp , sodium 122, now increased 7 in 24 hours      still with nausea      urine culture with [id number] Gram negative rods    HPI:       84 year old female  PMHx of memory disorder, undifferentiated somatoform disorder, Crohn's disease,  GERD  hyponatremia in 2/24 felt secondary to triampterene and hctz  She presented  to ER with low sodium levels that were reported from her GI doctor based on blood work drawn at appointment  and was advised to bring patient to ED   She has been  c/o general malaise and fatigue with nausea and vomiting  for weeks .   In the ER she was confused  she had leukocytosis with WBC 19  possible UTI  sodium 115 on admission    This am complain ing of weakness, nausea,  expressing depressive thoughts  had ct in ED with no acute finding  CT head was negative  troponin negative  Had recent endoscopy whichwas negative    ROS nausea, difficulty swallowing,  slight abdominal pain, no sob, no fevers, no chills, no angina  LABS:                            12.7   12.56 )-----------( 366      ( 30 Aug 2024 05:01 )             35.3     08-30    125<L>  |  99  |  10  ----------------------------<  93  4.7   |  22  |  0.87    Ca    9.1      30 Aug 2024 15:05  Phos  2.6     08-30    TPro  x   /  Alb  3.0<L>  /  TBili  x   /  DBili  x   /  AST  x   /  ALT  x   /  AlkPhos  x   08-30        LIVER FUNCTIONS - ( 30 Aug 2024 15:05 )  Alb: 3.0 g/dL / Pro: x     / ALK PHOS: x     / ALT: x     / AST: x     / GGT: x             Urinalysis Basic - ( 30 Aug 2024 15:05 )    Color: x / Appearance: x / SG: x / pH: x  Gluc: 93 mg/dL / Ketone: x  / Bili: x / Urobili: x   Blood: x / Protein: x / Nitrite: x   Leuk Esterase: x / RBC: x / WBC x   Sq Epi: x / Non Sq Epi: x / Bacteria: x              All other systems reviewed and found to be negative with the exception of what has been described above.    MEDICATIONS  (STANDING):  amLODIPine   Tablet 5 milliGRAM(s) Oral daily  enoxaparin Injectable 40 milliGRAM(s) SubCutaneous every 24 hours  mirtazapine 3.75 milliGRAM(s) Oral at bedtime  pantoprazole    Tablet 40 milliGRAM(s) Oral before breakfast  piperacillin/tazobactam IVPB.. 3.375 Gram(s) IV Intermittent every 8 hours  rosuvastatin 5 milliGRAM(s) Oral at bedtime  sodium chloride 0.9% with potassium chloride 20 mEq/L 1000 milliLiter(s) (50 mL/Hr) IV Continuous <Continuous>    MEDICATIONS  (PRN):  meclizine 12.5 milliGRAM(s) Oral two times a day PRN for dizziness  ondansetron Injectable 4 milliGRAM(s) IV Push every 8 hours PRN Nausea and/or Vomiting      VITALS:  T(F): 97.4 (08-30-24 @ 16:00), Max: 98.3 (08-30-24 @ 08:00)  HR: 66 (08-30-24 @ 16:00) (61 - 77)  BP: 130/62 (08-30-24 @ 16:00) (100/63 - 158/91)  RR: 18 (08-30-24 @ 16:00) (14 - 21)  SpO2: 100% (08-30-24 @ 16:00) (96% - 100%)  Wt(kg): --    I&O's Summary    29 Aug 2024 07:01  -  30 Aug 2024 07:00  --------------------------------------------------------  IN: 1236 mL / OUT: 2400 mL / NET: -1164 mL    30 Aug 2024 07:01  -  30 Aug 2024 16:58  --------------------------------------------------------  IN: 923 mL / OUT: 450 mL / NET: 473 mL        CAPILLARY BLOOD GLUCOSE          PHYSICAL EXAM:  Gen: No acute distress  awake, and oriented but lethargic but arousable   HEENT:  pupils equal and reactive, EOMI,   NECK:   supple, no carotid bruits, No JVD  CV:  +S1, +S2, regular rate rhythm, no murmurs or rubs  RESP:   lungs clear to auscultation bilaterally, no wheezing, rales, rhonchi, good air entry bilaterally  GI:  abdomen soft, non-tender, non-distended, normal BS, no bruits, no abdominal masses, no palpable masses  MSK:   normal muscle tone, no atrophy, no rigidity, no contractions  EXT:  no clubbing, no cyanosis, no edema, no calf pain, swelling or erythema  VASCULAR:  pulses equal and symmetric in the upper and lower extremities  NEURO:  AAOX3, no focal neurological deficits,  SKIN:  no ulcers, lesions or rashes      CULTURES:      Telemetry, personally reviewed
HOSPITALIST ATTENDING PROGRESS NOTE    Chart and meds reviewed.      Subjective: Patient seen and examined. Continues to report fatigue. WBC increased from 9.7 to 20.5 today, will repeat CBC. Patient a febrile. If remains elevated will repeat UA Blood Cultures and CXR. Continue IV zosyn at this time.       Additional results/Imaging, I have personally reviewed:    LABS:                            13.3   15.58 )-----------( 358      ( 01 Sep 2024 11:13 )             39.0     09-01    131<L>  |  99  |  7   ----------------------------<  110<H>  3.7   |  22  |  0.96    Ca    8.6      01 Sep 2024 06:58  Phos  2.6     08-30    TPro  x   /  Alb  3.0<L>  /  TBili  x   /  DBili  x   /  AST  x   /  ALT  x   /  AlkPhos  x   08-30        LIVER FUNCTIONS - ( 30 Aug 2024 15:05 )  Alb: 3.0 g/dL / Pro: x     / ALK PHOS: x     / ALT: x     / AST: x     / GGT: x             Urinalysis Basic - ( 01 Sep 2024 06:58 )    Color: x / Appearance: x / SG: x / pH: x  Gluc: 110 mg/dL / Ketone: x  / Bili: x / Urobili: x   Blood: x / Protein: x / Nitrite: x   Leuk Esterase: x / RBC: x / WBC x   Sq Epi: x / Non Sq Epi: x / Bacteria: x              All other systems reviewed and found to be negative with the exception of what has been described above.    MEDICATIONS  (STANDING):  amLODIPine   Tablet 5 milliGRAM(s) Oral daily  enoxaparin Injectable 40 milliGRAM(s) SubCutaneous every 24 hours  mirtazapine 3.75 milliGRAM(s) Oral at bedtime  pantoprazole    Tablet 40 milliGRAM(s) Oral before breakfast  piperacillin/tazobactam IVPB.. 3.375 Gram(s) IV Intermittent every 8 hours  rosuvastatin 5 milliGRAM(s) Oral at bedtime    MEDICATIONS  (PRN):  meclizine 12.5 milliGRAM(s) Oral two times a day PRN for dizziness  ondansetron Injectable 4 milliGRAM(s) IV Push every 8 hours PRN Nausea and/or Vomiting      VITALS:  T(F): 97.5 (09-01-24 @ 09:00), Max: 100.6 (09-01-24 @ 06:32)  HR: 72 (09-01-24 @ 09:00) (72 - 96)  BP: 104/62 (09-01-24 @ 09:00) (104/62 - 157/73)  RR: 18 (09-01-24 @ 09:00) (18 - 18)  SpO2: 96% (09-01-24 @ 09:00) (96% - 97%)  Wt(kg): --    I&O's Summary      CAPILLARY BLOOD GLUCOSE          PHYSICAL EXAM:  Gen: No acute distress  awake, and oriented but lethargic but arousable   HEENT:  pupils equal and reactive, EOMI,   NECK:   supple, no carotid bruits, No JVD  CV:  +S1, +S2, regular rate rhythm, no murmurs or rubs  RESP:   lungs clear to auscultation bilaterally, no wheezing, rales, rhonchi, good air entry bilaterally  GI:  abdomen soft, non-tender, non-distended, normal BS, no bruits, no abdominal masses, no palpable masses  MSK:   normal muscle tone, no atrophy, no rigidity, no contractions  EXT:  no clubbing, no cyanosis, no edema, no calf pain, swelling or erythema  VASCULAR:  pulses equal and symmetric in the upper and lower extremities  NEURO:  AAOX3, no focal neurological deficits,  SKIN:  no ulcers, lesions or rashes    CULTURES:      Telemetry, personally reviewed
Interval History:      Patient got 2 doses of ddavp , sodium 122, now increased 7 in 24 hours      still with nausea      urine culture with [id number] Gram negative rods    HPI:       84 year old female  PMHx of memory disorder, undifferentiated somatoform disorder, Crohn's disease,  GERD  hyponatremia in 2/24 felt secondary to triampterene and hctz  She presented  to ER with low sodium levels that were reported from her GI doctor based on blood work drawn at appointment  and was advised to bring patient to ED   She has been  c/o general malaise and fatigue with nausea and vomiting  for weeks .   In the ER she was confused  she had leukocytosis with WBC 19  possible UTI  sodium 115 on admission    This am complain ing of weakness, nausea,  expressing depressive thoughts  had ct in ED with no acute finding  CT head was negative  troponin negative  Had recent endoscopy whichwas negative    ROS nausea, difficulty swallowing,  slight abdominal pain, no sob, no fevers, no chills, no angina      PHYSICAL EXAM:    Daily     Daily     ICU Vital Signs Last 24 Hrs  T(C): 36.4 (29 Aug 2024 17:00), Max: 36.9 (28 Aug 2024 21:00)  T(F): 97.5 (29 Aug 2024 17:00), Max: 98.4 (28 Aug 2024 21:00)  HR: 70 (29 Aug 2024 18:00) (50 - 94)  BP: 109/94 (29 Aug 2024 18:00) (94/48 - 171/78)  BP(mean): 100 (29 Aug 2024 18:00) (62 - 124)  ABP: --  ABP(mean): --  RR: 18 (29 Aug 2024 18:00) (15 - 26)  SpO2: 98% (29 Aug 2024 18:00) (88% - 100%)    O2 Parameters below as of 29 Aug 2024 16:00  Patient On (Oxygen Delivery Method): room air   Physical Exam  Neuro awake, and oriented but lethargic but arousable   heent - nc/at  neck supple  lungs clear  cv rrr  abdomen soft, non tender  extremtieis no edema  skin no rashes                                        13.0   12.73 )-----------( 354      ( 29 Aug 2024 05:35 )             36.7       CBC Full  -  ( 29 Aug 2024 05:35 )  WBC Count : 12.73 K/uL  RBC Count : 4.34 M/uL  Hemoglobin : 13.0 g/dL  Hematocrit : 36.7 %  Platelet Count - Automated : 354 K/uL  Mean Cell Volume : 84.6 fl  Mean Cell Hemoglobin : 30.0 pg  Mean Cell Hemoglobin Concentration : 35.4 gm/dL  Auto Neutrophil # : x  Auto Lymphocyte # : x  Auto Monocyte # : x  Auto Eosinophil # : x  Auto Basophil # : x  Auto Neutrophil % : x  Auto Lymphocyte % : x  Auto Monocyte % : x  Auto Eosinophil % : x  Auto Basophil % : x      08-29    120<LL>  |  90<L>  |  6<L>  ----------------------------<  112<H>  3.7   |  23  |  0.71    Ca    8.8      29 Aug 2024 14:41  Mg     2.5     08-28    TPro  7.1  /  Alb  3.3  /  TBili  0.6  /  DBili  x   /  AST  12<L>  /  ALT  13  /  AlkPhos  97  08-28      LIVER FUNCTIONS - ( 28 Aug 2024 06:55 )  Alb: 3.3 g/dL / Pro: 7.1 gm/dL / ALK PHOS: 97 U/L / ALT: 13 U/L / AST: 12 U/L / GGT: x             PT/INR - ( 28 Aug 2024 06:55 )   PT: 11.6 sec;   INR: 1.03 ratio                   Urinalysis Basic - ( 29 Aug 2024 14:41 )    Color: x / Appearance: x / SG: x / pH: x  Gluc: 112 mg/dL / Ketone: x  / Bili: x / Urobili: x   Blood: x / Protein: x / Nitrite: x   Leuk Esterase: x / RBC: x / WBC x   Sq Epi: x / Non Sq Epi: x / Bacteria: x            MEDICATIONS  (STANDING):  amLODIPine   Tablet 5 milliGRAM(s) Oral daily  enoxaparin Injectable 40 milliGRAM(s) SubCutaneous every 24 hours  mirtazapine 3.75 milliGRAM(s) Oral at bedtime  pantoprazole    Tablet 40 milliGRAM(s) Oral before breakfast  piperacillin/tazobactam IVPB.. 3.375 Gram(s) IV Intermittent every 8 hours  rosuvastatin 5 milliGRAM(s) Oral at bedtime  sodium chloride 0.9% with potassium chloride 20 mEq/L 1000 milliLiter(s) (50 mL/Hr) IV Continuous <Continuous>      
  HPI:    CODE STATUS: Full Code    9/4  pt seen and examined  confused baseline- calm in NAD denied pain nausea vomiting  although ROS was limited d/t her confusion at times  GOC held w/ pts son yesterday - case d/dw team and w/   significant risk of complicated grief d/t hx of pts    chaplaincy continues to follow      Pain and Dyspnea:   denied , and no signs of physical discomfort/distress  breathing regular even- denied dyspnea        Review of Systems:     Anxiety- hx of  Depression- pt w/ hx of   Physical Discomfort- in NAD  Dyspnea-denies  Constipation-denies   Vomiting- none  Weight Loss-    Cough- none   Secretions-none  Fatigue-+   Weakness- generalized  Delirium-+     limited d/t mental status          PHYSICAL EXAM:    Vital Signs Last 24 Hrs  T(C): 36.6 (04 Sep 2024 08:05), Max: 36.6 (04 Sep 2024 08:05)  T(F): 97.9 (04 Sep 2024 08:05), Max: 97.9 (04 Sep 2024 08:05)  HR: 66 (04 Sep 2024 08:05) (66 - 66)  BP: 114/60 (04 Sep 2024 08:05) (114/60 - 114/60)  BP(mean): 70 (04 Sep 2024 08:05) (70 - 70)  RR: 18 (04 Sep 2024 08:05) (18 - 18)  SpO2: 94% (04 Sep 2024 08:05) (94% - 94%)    Parameters below as of 04 Sep 2024 08:05  Patient On (Oxygen Delivery Method): room air      Daily     Daily     PPSV2:  30 %  FAST: 6    General: calm in NAD  Mental Status: awake alert oriented x1-2  HEENT: eomi, perrl  Lungs: ctabl b/l bs  Cardiac: s1s2 no mgr  GI: soft nontender +BS  : voids  Ext: moves all 4 extremities spontaneously  Neuro: delirium      LABS and RADIOLOGY: REVIEWED

## 2024-09-04 NOTE — DISCHARGE NOTE PROVIDER - NSDCCPCAREPLAN_GEN_ALL_CORE_FT
PRINCIPAL DISCHARGE DIAGNOSIS  Diagnosis: Hyponatremia  Assessment and Plan of Treatment: you came in with low serum sodium level. We consulted nephrologist and gave you saline IV fluid and monitored your blood work daily. You sodium improved. Likely due to home lexapro.  PLEASE STop taking lexapro at home  please follow up with primary care physician and nephrologist within 1 wek for discharge      SECONDARY DISCHARGE DIAGNOSES  Diagnosis: Acute metabolic encephalopathy  Assessment and Plan of Treatment: You came in with Altered Mental Status  with acute metabolic encephalopathy likely secondary to combination fo Urinary tract infection and hyponatremia. You were treated with IV antibiotics and your repeat blood cultures are negative   we consulted pyschiatrist  you were started on victoza  	No psychiatric contraindications to discharge per psych    Diagnosis: E. coli UTI (urinary tract infection)  Assessment and Plan of Treatment: urinary tract infection (UTI) is an infection of any part of the urinary tract, which includes the kidneys, ureters, bladder, and urethra. These organs make, store, and get rid of urine in the body. UTI can be a bladder infection (cystitis) or kidney infection (pyelonephritis).  you were treated with IV antibiotics   repeat blood cultures are negative       Diagnosis: Leucocytosis  Assessment and Plan of Treatment: your wbc count improved with IV antibiotics    Diagnosis: Nausea  Assessment and Plan of Treatment: nausea, ? related to hyponatremia,  monitor diet as tolerated, had endoscopy in recent past for nausea, abdominal ct was negative  take protonix 1 tablet daily       Diagnosis: Depression with anxiety  Assessment and Plan of Treatment:      PRINCIPAL DISCHARGE DIAGNOSIS  Diagnosis: Hyponatremia  Assessment and Plan of Treatment: you came in with low serum sodium level. We consulted nephrologist and gave you saline IV fluid and monitored your blood work daily. You sodium improved. Likely due to home lexapro.  PLEASE STop taking lexapro at home  please follow up with primary care physician and nephrologist within 1 wek for discharge      SECONDARY DISCHARGE DIAGNOSES  Diagnosis: Acute metabolic encephalopathy  Assessment and Plan of Treatment: You came in with Altered Mental Status  with acute metabolic encephalopathy likely secondary to combination fo Urinary tract infection and hyponatremia. You were treated with IV antibiotics and your repeat blood cultures are negative   we consulted pyschiatrist  you were started on mirtazapine   No psychiatric contraindications to discharge per psych  PLEASE take mirtazapine 3.75mg 1 tablet daily   and meclizine 12.5 mg 1 tablet twice daily for dizziness  please follow up with primary care physician and pyshiatrist  within 1 wek for discharge    Diagnosis: E. coli UTI (urinary tract infection)  Assessment and Plan of Treatment: urinary tract infection (UTI) is an infection of any part of the urinary tract, which includes the kidneys, ureters, bladder, and urethra. These organs make, store, and get rid of urine in the body. UTI can be a bladder infection (cystitis) or kidney infection (pyelonephritis).  you were treated with IV antibiotics   repeat blood cultures and urine cultures are negative   please follow up with primary care physician within 1 wek for discharge    Diagnosis: Leucocytosis  Assessment and Plan of Treatment: your wbc count improved with IV antibiotics    Diagnosis: Nausea  Assessment and Plan of Treatment: nausea, ? related to hyponatremia,  monitor diet as tolerated, had endoscopy in recent past for nausea, abdominal ct was negative  take protonix 1 tablet daily       Diagnosis: Depression with anxiety  Assessment and Plan of Treatment: take mirtazapine 3.75mg 1 tablet daily , sent to pharmacy    Diagnosis: Elevated blood pressure reading  Assessment and Plan of Treatment: high blood pressure, is a condition where your blood pressures are too high. Over time, this can lead to problems with your heart, kidney, blood vessels, and other organs. Uncontrolled hypertension can also lead to major adverse events like strokes and bleeds. During your hospitalization, your blood pressures were constantly monitored and treated with medication. It is very important that you continue your medications as prescribed and make a follow up appointment with your primary care physician to address changes made to your regimen as soon as you leave the hospital.  IF Blood pressure is high >140/90 Take amlodipine 5mg 1 tablet daily

## 2024-09-04 NOTE — PROGRESS NOTE ADULT - PROVIDER SPECIALTY LIST ADULT
Nephrology
Critical Care
Critical Care
Hospitalist
Nephrology
Palliative Care
Hospitalist
Hospitalist
Nephrology
Hospitalist
Critical Care

## 2024-09-04 NOTE — DISCHARGE NOTE PROVIDER - CARE PROVIDER_API CALL
may shower starting tomorrow 9/19/23
Boyd Joy  Neurology  [address] [place] [address]  Phone: [phone]  Fax: [phone]  Established Patient  Follow Up Time: 1 week    Venancio Wells  Internal Medicine  [address], [address]  [place] [address]  Phone: [phone]  Fax: [phone]  Established Patient  Follow Up Time: 1 week    Antoine Dickson  Nephrology  [address], [address]  [place] [address]  Phone: [phone]  Fax: [phone]  Follow Up Time: 1 week

## 2024-09-04 NOTE — PROGRESS NOTE ADULT - REASON FOR ADMISSION
hyponatremia

## 2024-09-04 NOTE — PROGRESS NOTE ADULT - ASSESSMENT
Process of Care  --Reviewed dx/treatment problems and alignment with Goals of Care     Hyponatremia possibly 2/2medications  UTI -tx of abx  AMS -possible undx dementia? acute worsening by metabolic encephalopathy  ANICETO   HTN   dementia vs. adjustment disorder- psych following    Physical Aspects of Care  --Pain  patient denies at this time     --Bowel Regimen  denies constipation  risk for constipation d/t immobility  Senna 2 tabs QHS PRN     --Dyspnea  No SOB at this time  comfortable and in NAD    --Nausea Vomiting  denies    --Weakness  PT as tolerated     Psychological and Psychiatric Aspects of Care:   Grief Bereavement emotional support provided  --Hx of psychiatric dx: depression, anxiety, somatic disorder    Spiritual care  -Pastoral Care Available PRN     Social Aspects of Care  -SW involved     Cultural Aspects  -Primary Language: English    Goals of Care:     We discussed Palliative Care team being a supportive team when a patient has ongoing illnesses.  We also discussed that it is not an end of life care service, but can help navigate symptoms and emotional support throughout their hospital stay here.     Hospice was explained as well as an end of life care philosophy. When a disease cannot be cured, or family/patient decide the treatment burdens out weight the risk and chose to change focus of treatment from cure to quality/comfort.     Son was not interested in this approach- will follow up for further emotional support.     Ethical and Legal Aspects:   NA        Capacity: pt defers- w/ simple capacity at best-   HCP/Surrogate: Dougie Canada is primary on [facility]  Code Status: FULL CODE  MOLST: none on charts   Dispo Plan: c/w disease oriented treatment    Discussed With: Case coordinated with attending and SW and RN     Time Spent: 45 minutes including the care, coordination and counseling of this patient, 50% of which was spent coordinating and counseling.

## 2024-09-04 NOTE — DISCHARGE NOTE PROVIDER - HOSPITAL COURSE
HPI:   84 year old female  PMHx of memory disorder, undifferentiated somatoform disorder, Crohn's disease, osteoporosis, MDD, IBD, GERD s/p recent EGD . She presented  to ER with low sodium levels that were reported from her GI doctor based on blood work drawn at appointment  and was advised to bring patient to ED   She has been  c/o general malaise and fatigue with nausea and vomiting  for weeks  per the notes.     In the ER she is confused to place and time verbally she denies  CP, SOB, dizziness. Labs found her to be hyponatremic with sodium or 115 she has leukocytosis with WBC 19. I performed bedside POCUS and found her intravascularly depleted with flattening IVC on respirations indicating likely  hypovolemic hyponatremia. The UA has returned to show eveidence of UTI for which she was started on antibiotics  (27 Aug 2024 23:32)      ---  HOSPITAL COURSE: Patient admitted to ICU for hyponatremia and altered mental status had sodium to 125 in Febuary felt secondary to  HCTZ was seen by nephrology at that Time. patient admitted with Altered Mental Status- acute metabolic encephelopathy 2/2  Hyponatremia and UTI.      For Altered Mental Status likely secondary to combination fo UTI and hyponatremia.  Hyponatremia -Patient was on lexapro , on hold. Patient was given IVF saline. Nephrologist consulted, sodium improved 136 today         #  Leukocytosis due to UTI was started on Zosyn  culture [id number] Gram negative rods f/u cx - proteus sensitive to Zosyn   Repeat blood culture negative  CXR without acute cardiopulmonary process. Completed course of Azithromycin     # nausea, ? related to hyponatremia,  PPI, monitor diet as tolerated, had endoscopy in recent past for nausea, abdominal ct was negative. improved     # depression - psych recommended  Victoza   - No psychiatric contraindications to discharge  Aftercare Recommendations: Patient can be referred to outside provider.  Patient resides in [place] and social work can refer to private psychiatrist.  Alternatively patient can be referred to family service [facility].  If disposition is to go to subacute rehab or to nursing home she can resume psychiatric treatment in those facilities.      HCP/Surrogate: Dougie Nancie is primary on [facility]  Code Status: FULL CODE    Patient stable to discharge to home with private hire. Pt's son reported plan to having additional help hired at home to optimize pt's care at home     ---  CONSULTANTS:   Antoine Dickson (DO)  Guy Benitez) mariel    ---  TIME SPENT:  I, the attending physician, was physically present for the key portions of the evaluation and management (E/M) service provided. The total amount of time spent reviewing the hospital notes, laboratory values, imaging findings, assessing/counseling the patient, discussing with consultant physicians, social work, nursing staff was -- minutes    ---     HPI:   84 year old female  PMHx of memory disorder, undifferentiated somatoform disorder, Crohn's disease, osteoporosis, MDD, IBD, GERD s/p recent EGD . She presented  to ER with low sodium levels that were reported from her GI doctor based on blood work drawn at appointment  and was advised to bring patient to ED   She has been  c/o general malaise and fatigue with nausea and vomiting  for weeks  per the notes.     In the ER she is confused to place and time verbally she denies  CP, SOB, dizziness. Labs found her to be hyponatremic with sodium or 115 she has leukocytosis with WBC 19. I performed bedside POCUS and found her intravascularly depleted with flattening IVC on respirations indicating likely  hypovolemic hyponatremia. The UA has returned to show eveidence of UTI for which she was started on antibiotics  (27 Aug 2024 23:32)      ---  HOSPITAL COURSE: Patient admitted to ICU for hyponatremia and altered mental status had sodium to 125 in Febuary felt secondary to  HCTZ was seen by nephrology at that Time. patient admitted with Altered Mental Status- acute metabolic encephelopathy 2/2  Hyponatremia and UTI.      For Altered Mental Status likely secondary to combination fo UTI and hyponatremia.  Hyponatremia -Patient was on lexapro , on hold. Patient was given IVF saline. Nephrologist consulted, sodium improved 136 today         #  Leukocytosis due to UTI was started on Zosyn  culture [id number] Gram negative rods f/u cx - proteus sensitive to Zosyn   Repeat urine  culture negative, blood culture negative   CXR without acute cardiopulmonary process. Completed course of Azithromycin     # nausea, ? related to hyponatremia,  PPI, monitor diet as tolerated, had endoscopy in recent past for nausea, abdominal ct was negative. improved     # depression - psych recommended mirtazapine daily   - No psychiatric contraindications to discharge  Aftercare Recommendations: Patient can be referred to outside provider.  Patient resides in [place] and social work can refer to private psychiatrist.  Alternatively patient can be referred to family service [facility].  If disposition is to go to subacute rehab or to nursing home she can resume psychiatric treatment in those facilities.      HCP/Surrogate: Dougie Morelbahman is primary   Code Status: FULL CODE    Patient stable to discharge to home with private hire. Pt's son reported plan to having additional help hired at home to optimize pt's care at home     ---  CONSULTANTS:   Antoine Dickson (DO)  Guy Benitez) mariel    ---  TIME SPENT:  I, the attending physician, was physically present for the key portions of the evaluation and management (E/M) service provided. The total amount of time spent reviewing the hospital notes, laboratory values, imaging findings, assessing/counseling the patient, discussing with consultant physicians, social work, nursing staff was -- minutes    ---

## 2024-09-04 NOTE — DISCHARGE NOTE PROVIDER - DETAILS OF MALNUTRITION DIAGNOSIS/DIAGNOSES
This patient has been assessed with a concern for Malnutrition and was treated during this hospitalization for the following Nutrition diagnosis/diagnoses:     -  08/29/2024: Severe protein-calorie malnutrition

## 2024-09-04 NOTE — DISCHARGE NOTE PROVIDER - CARE PROVIDERS DIRECT ADDRESSES
,DirectAddress_Unknown,[email or web address]

## 2024-09-04 NOTE — DISCHARGE NOTE PROVIDER - PROVIDER TOKENS
PROVIDER:[TOKEN:[[id number]],FOLLOWUP:[1 week],ESTABLISHEDPATIENT:[T]],PROVIDER:[TOKEN:[[id number]],FOLLOWUP:[1 week],ESTABLISHEDPATIENT:[T]],PROVIDER:[TOKEN:[[id number]],FOLLOWUP:[1 week]]

## 2024-09-06 LAB
CULTURE RESULTS: SIGNIFICANT CHANGE UP
CULTURE RESULTS: SIGNIFICANT CHANGE UP
SPECIMEN SOURCE: SIGNIFICANT CHANGE UP
SPECIMEN SOURCE: SIGNIFICANT CHANGE UP

## 2024-09-08 LAB
CORTICOSTEROID BINDING GLOBULIN RESULT: 2.2 MG/DL — SIGNIFICANT CHANGE UP
CORTIS F/TOTAL MFR SERPL: 46 % — SIGNIFICANT CHANGE UP
CORTIS SERPL-MCNC: 27 UG/DL — HIGH
CORTISOL, FREE RESULT: 12 UG/DL — HIGH

## 2024-09-09 DIAGNOSIS — E43 UNSPECIFIED SEVERE PROTEIN-CALORIE MALNUTRITION: ICD-10-CM

## 2024-09-09 DIAGNOSIS — B96.20 UNSPECIFIED ESCHERICHIA COLI [E. COLI] AS THE CAUSE OF DISEASES CLASSIFIED ELSEWHERE: ICD-10-CM

## 2024-09-09 DIAGNOSIS — N17.9 ACUTE KIDNEY FAILURE, UNSPECIFIED: ICD-10-CM

## 2024-09-09 DIAGNOSIS — K50.90 CROHN'S DISEASE, UNSPECIFIED, WITHOUT COMPLICATIONS: ICD-10-CM

## 2024-09-09 DIAGNOSIS — E87.1 HYPO-OSMOLALITY AND HYPONATREMIA: ICD-10-CM

## 2024-09-09 DIAGNOSIS — F41.9 ANXIETY DISORDER, UNSPECIFIED: ICD-10-CM

## 2024-09-09 DIAGNOSIS — N39.0 URINARY TRACT INFECTION, SITE NOT SPECIFIED: ICD-10-CM

## 2024-09-09 DIAGNOSIS — T50.2X5A ADVERSE EFFECT OF CARBONIC-ANHYDRASE INHIBITORS, BENZOTHIADIAZIDES AND OTHER DIURETICS, INITIAL ENCOUNTER: ICD-10-CM

## 2024-09-09 DIAGNOSIS — F32.9 MAJOR DEPRESSIVE DISORDER, SINGLE EPISODE, UNSPECIFIED: ICD-10-CM

## 2024-09-09 DIAGNOSIS — M81.0 AGE-RELATED OSTEOPOROSIS WITHOUT CURRENT PATHOLOGICAL FRACTURE: ICD-10-CM
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
## 2024-09-09 DIAGNOSIS — K52.9 NONINFECTIVE GASTROENTERITIS AND COLITIS, UNSPECIFIED: ICD-10-CM

## 2024-09-09 DIAGNOSIS — G93.41 METABOLIC ENCEPHALOPATHY: ICD-10-CM

## 2024-09-09 DIAGNOSIS — K21.9 GASTRO-ESOPHAGEAL REFLUX DISEASE WITHOUT ESOPHAGITIS: ICD-10-CM
